# Patient Record
Sex: FEMALE | Race: WHITE | NOT HISPANIC OR LATINO | Employment: FULL TIME | URBAN - METROPOLITAN AREA
[De-identification: names, ages, dates, MRNs, and addresses within clinical notes are randomized per-mention and may not be internally consistent; named-entity substitution may affect disease eponyms.]

---

## 2017-01-06 ENCOUNTER — PRENATAL OFFICE VISIT (OUTPATIENT)
Dept: OBGYN | Facility: CLINIC | Age: 27
End: 2017-01-06
Payer: COMMERCIAL

## 2017-01-06 VITALS
DIASTOLIC BLOOD PRESSURE: 61 MMHG | OXYGEN SATURATION: 95 % | BODY MASS INDEX: 36.69 KG/M2 | TEMPERATURE: 97.4 F | WEIGHT: 204 LBS | HEART RATE: 80 BPM | SYSTOLIC BLOOD PRESSURE: 116 MMHG

## 2017-01-06 DIAGNOSIS — Z34.80 ENCOUNTER FOR SUPERVISION OF OTHER NORMAL PREGNANCY: Primary | ICD-10-CM

## 2017-01-06 PROCEDURE — 99207 ZZC PRENATAL VISIT: CPT | Performed by: NURSE PRACTITIONER

## 2017-01-06 NOTE — PROGRESS NOTES
Patient presents for routine prenatal visit. Prenatal flowsheet reviewed and updated as needed.  Denies vaginal bleeding, loss of fluid, contractions or cramping.  Patient without complaint. Screening ultrasound ordered.  Discussed Quad screening on return to clinic if desired-likely will decline.  Patient has questions related to her abnormal pap smear and need for colposcopy, these are answered for her. Will call to schedule colposcopy.  Advice as per Anticipatory Guidance/Checklist updated.  PE: See OB vitals    Questions asked and answered. Next OB visit in 4 week(s) with Dr. Ashraf.    Arianna FAROOQ CNP

## 2017-01-06 NOTE — MR AVS SNAPSHOT
After Visit Summary   1/6/2017    Gloria Sun    MRN: [id number]           Patient Information     Date Of Birth          1990        Visit Information        Provider Department      1/6/2017 9:30 AM Arianna Pierre APRN CNP [facility]         Follow-ups after your visit        Your next 10 appointments already scheduled     Feb 02, 2017  9:30 AM   ESTABLISHED PRENATAL with OSEAS Kenny CNP   [facility] ([facility])    [address] [facility] [address] [phone]              Who to contact     If you have questions or need follow up information about today's clinic visit or your schedule please contact [facility] directly at [phone].  Normal or non-critical lab and imaging results will be communicated to you by MyChart, letter or phone within 4 business days after the clinic has received the results. If you do not hear from us within 7 days, please contact the clinic through MyChart or phone. If you have a critical or abnormal lab result, we will notify you by phone as soon as possible.  Submit refill requests through [employer] or call your pharmacy and they will forward the refill request to us. Please allow 3 business days for your refill to be completed.          Additional Information About Your Visit        MyChart Information     [employer] gives you secure access to your electronic health record. If you see a primary care provider, you can also send messages to your care team and make appointments. If you have questions, please call your primary care clinic.  If you do not have a primary care provider, please call [phone] and they will assist you.        Care EveryWhere ID     This is your Care EveryWhere ID. This could be used by other organizations to access your [place] medical records  [id number]        Your Vitals Were     Pulse Temperature Pulse Oximetry Last Period          80 97.4   F (36.3  C) (Oral) 95% (LMP Unknown)         Blood Pressure from Last 3 Encounters:   01/06/17 116/61   12/09/16 105/68   11/14/16 107/66    Weight from Last 3 Encounters:   01/06/17 204 lb (92.534 kg)   12/09/16 204 lb 6.4 oz (92.715 kg)   11/14/16 211 lb 6.4 oz (95.89 kg)              Today, you had the following     No orders found for display       Primary Care Provider Office Phone # Fax #    Susanne Morales [phone]       [facility] [address] [address]        Goals        Patient-Stated    Psychosocial     Goal Comments - Note edited  9/20/2016  1:05 PM by Shahrzad Hernandez BSW    As of today's date 9/20/2016 goal is met at 26 - 50%.   Goal Status:  Discontinued   SW unable to contact pt X3  As of today's date 6/23/2016 goal is met at 26 - 50%.   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     Thank you for choosing [facility]  [facility]. Our goal is always to provide you with excellent care. Hearing back from our patients is one way we can continue to improve our services. Please take a few minutes to complete the written survey that you may receive in the mail after your visit with us. Thank you!             Your Updated Medication List - Protect others around you: Learn how to safely use, store and throw away your medicines at www.disposemymeds.org.          This list is accurate as of: 1/6/17  9:55 AM.  Always use your most recent med list.                   Brand Name Dispense Instructions for use    albuterol 108 (90 BASE) MCG/ACT Inhaler    PROAIR HFA/PROVENTIL HFA/VENTOLIN HFA    1 Inhaler    Inhale 2 puffs into the lungs every 4 hours as needed for shortness of breath / dyspnea or wheezing       PRENATAL VITAMIN PO      Take 1  tablet by mouth daily

## 2017-01-06 NOTE — NURSING NOTE
"Chief Complaint   Patient presents with     Prenatal Care       Initial /61 mmHg  Pulse 80  Temp(Src) 97.4  F (36.3  C) (Oral)  Wt 204 lb (92.534 kg)  SpO2 95%  LMP  (LMP Unknown) Estimated body mass index is 36.69 kg/(m^2) as calculated from the following:    Height as of 12/9/16: 5' 2.5\" (1.588 m).    Weight as of this encounter: 204 lb (92.534 kg)..  BP completed using cuff size: vero Mcmahon M.A.      "

## 2017-01-16 ENCOUNTER — OFFICE VISIT (OUTPATIENT)
Dept: OBGYN | Facility: CLINIC | Age: 27
End: 2017-01-16
Payer: COMMERCIAL

## 2017-01-16 VITALS
TEMPERATURE: 98.4 F | SYSTOLIC BLOOD PRESSURE: 110 MMHG | WEIGHT: 208 LBS | DIASTOLIC BLOOD PRESSURE: 61 MMHG | OXYGEN SATURATION: 99 % | BODY MASS INDEX: 37.41 KG/M2 | HEART RATE: 65 BPM

## 2017-01-16 DIAGNOSIS — R87.610 ASCUS WITH POSITIVE HIGH RISK HPV CERVICAL: Primary | ICD-10-CM

## 2017-01-16 DIAGNOSIS — R87.810 ASCUS WITH POSITIVE HIGH RISK HPV CERVICAL: Primary | ICD-10-CM

## 2017-01-16 DIAGNOSIS — Z34.80 ENCOUNTER FOR SUPERVISION OF OTHER NORMAL PREGNANCY: ICD-10-CM

## 2017-01-16 PROCEDURE — 99212 OFFICE O/P EST SF 10 MIN: CPT | Mod: 25 | Performed by: OBSTETRICS & GYNECOLOGY

## 2017-01-16 PROCEDURE — 57452 EXAM OF CERVIX W/SCOPE: CPT | Performed by: OBSTETRICS & GYNECOLOGY

## 2017-01-16 NOTE — MR AVS SNAPSHOT
After Visit Summary   1/16/2017    Gloria Sun    MRN: [id number]           Patient Information     Date Of Birth          1990        Visit Information        Provider Department      1/16/2017 12:00 PM Ravi Ashraf MD; [place] PROCEDURE ROOM 2 [facility]        Today's Diagnoses     ASCUS with positive high risk HPV cervical    -  1        Follow-ups after your visit        Your next 10 appointments already scheduled     Jan 16, 2017 12:00 PM   Office Visit with Ravi Ashraf MD, [place] [facility]EDURE ROOM [facility]    [address] [address]   [phone]           Bring a current list of meds and any records pertaining to this visit.  For Physicals, please bring immunization records and any forms needing to be filled out.  Please arrive 10 minutes early to complete paperwork.            Feb 03, 2017  9:30 AM   ESTABLISHED PRENATAL with OSEAS Kenny CNP   [facility]    [address] [facility] [address] [phone]            Feb 03, 2017 11:00 AM   US OB > 14 WEEKS COMPLETE SINGLE with FKUS1   [facility] ([facility])    [address] [place] [id number] [phone]           Please bring a list of your medicines (including vitamins, minerals and over-the-counter drugs). Also, tell your doctor about any allergies you may have. Wear comfortable clothes and leave your valuables at home.  If you re less than 20 weeks drink four 8-ounce glasses of fluid an hour before your exam. If you need to empty your bladder before your exam, try to release only a little urine. Then, drink another glass of fluid.  You may have up to two family members in the exam room. If you bring a small child, an adult must be there to care for him or her.  Please call the Imaging Department at your exam site with any  questions.              Who to contact     If you have questions or need follow up information about today's clinic visit or your schedule please contact [facility] directly at [phone].  Normal or non-critical lab and imaging results will be communicated to you by MyChart, letter or phone within 4 business days after the clinic has received the results. If you do not hear from us within 7 days, please contact the clinic through MyChart or phone. If you have a critical or abnormal lab result, we will notify you by phone as soon as possible.  Submit refill requests through [employer] or call your pharmacy and they will forward the refill request to us. Please allow 3 business days for your refill to be completed.          Additional Information About Your Visit        [employer] Information     [employer] gives you secure access to your electronic health record. If you see a primary care provider, you can also send messages to your care team and make appointments. If you have questions, please call your primary care clinic.  If you do not have a primary care provider, please call [phone] and they will assist you.        Care EveryWhere ID     This is your Care EveryWhere ID. This could be used by other organizations to access your [place] medical records  [id number]        Your Vitals Were     Pulse Temperature Pulse Oximetry Last Period          65 98.4  F (36.9  C) (Oral) 99% (LMP Unknown)         Blood Pressure from Last 3 Encounters:   01/16/17 110/61   01/06/17 116/61   12/09/16 105/68    Weight from Last 3 Encounters:   01/16/17 208 lb (94.348 kg)   01/06/17 204 lb (92.534 kg)   12/09/16 204 lb 6.4 oz (92.715 kg)              Today, you had the following     No orders found for display       Primary Care Provider Office Phone # Fax #    Susanne Morales [phone]       [facility] [address] [place] [address]        Goals        Patient-Stated     Psychosocial     Goal Comments - Note edited  9/20/2016  1:05 PM by Shahrzad Hernandez BSW    As of today's date 9/20/2016 goal is met at 26 - 50%.   Goal Status:  Discontinued   SW unable to contact pt X3  As of today's date 6/23/2016 goal is met at 26 - 50%.   Goal Status:  Active   Pt's mother is continuing to work with county financial worker to get her 2 year old son onto Medical Assistance                                                                                  John Hernandez LSW          Thank you!     Thank you for choosing [facility]  for your care. Our goal is always to provide you with excellent care. Hearing back from our patients is one way we can continue to improve our services. Please take a few minutes to complete the written survey that you may receive in the mail after your visit with us. Thank you!             Your Updated Medication List - Protect others around you: Learn how to safely use, store and throw away your medicines at www.disposemymeds.org.          This list is accurate as of: 1/16/17 11:54 AM.  Always use your most recent med list.                   Brand Name Dispense Instructions for use    albuterol 108 (90 BASE) MCG/ACT Inhaler    PROAIR HFA/PROVENTIL HFA/VENTOLIN HFA    1 Inhaler    Inhale 2 puffs into the lungs every 4 hours as needed for shortness of breath / dyspnea or wheezing       PRENATAL VITAMIN PO      Take 1 tablet by mouth daily

## 2017-01-16 NOTE — NURSING NOTE
"Chief Complaint   Patient presents with     Colposcopy     ASC-US HPV+       Initial /61 mmHg  Pulse 65  Temp(Src) 98.4  F (36.9  C) (Oral)  Wt 208 lb (94.348 kg)  SpO2 99%  LMP  (LMP Unknown) Estimated body mass index is 37.41 kg/(m^2) as calculated from the following:    Height as of 12/9/16: 5' 2.5\" (1.588 m).    Weight as of this encounter: 208 lb (94.348 kg).  BP completed using cuff size: jean Granados CMA      "

## 2017-01-18 NOTE — PROGRESS NOTES
Gloria presents for colposcopy. Pap showed: ASCUS HR HPV+.     PMH/PSH/Meds/Allergies reviewed & documented in [facility].    I discussed with the patient the results of her pap smear and/or HPV studies.    I discussed our current understanding of abnormal cytology and the role hpv can play in pre-cancerous cervical change.  I explained the current cytological/histologic terminology.      We discussed the screening nature of pap smears and the need for a more definitive examination.    She is given the opportunity to ask questions and have them answered.  She does agree to proceed with colposcopy.    Procedure for colposcopy and biopsy has been explained to the   patient and consent obtained.    PROCEDURE:  Speculum placed in vagina and excellent visualization of cervix achieved, cervix swabbed  with acetic acid solution.    FINDINGS:  Cervix: no visible lesions, no mosaicism, no punctation and no abnormal vasculature; SCJ visualized 360 degrees without lesions and no biopsies taken.    Vaginal inspection: vaginal colposcopy not performed.    Vulvar colposcopy: vulvar colposcopy not performed.    Procedure Summary: Patient tolerated procedure well and colposcopy adequate.    Colposcopic Impression: HPV effect    10-15 minutes were spent in face to face discussion regarding her pap and HPV status and how that affects her pregnancy.  This was in addition to the time required for the procedure.     Plan:  Co-testing postpartum.    Ravi Ashraf MD FACOG

## 2017-01-28 ENCOUNTER — TELEPHONE (OUTPATIENT)
Dept: NURSING | Facility: CLINIC | Age: 27
End: 2017-01-28

## 2017-01-28 NOTE — TELEPHONE ENCOUNTER
"Call Type: Triage Call    Presenting Problem: \"I am pregnant and I have a cold (no fever), I am  wondering which OTC mneds I can take.\" Gave list of medications safe  to take during pregnancy per RN protocol. No triage needed. Advised  if sx worsen or you develop a fever to make appt.  Triage Note:  Guideline Title: Medication Questions - Adult  Recommended Disposition: Provide Health Information  Original Inclination: Wanted to speak with a nurse  Override Disposition:  Intended Action: Follow advice given  Physician Contacted: No  Caller has medication question(s) that was answered with available resources ?  YES  Pregnant and has medication questions regarding prescribed and/or nonprescribed  medication(s) not covered by available resources ? NO  Breastfeeding and has medication questions regarding prescribed and/or  nonprescribed medication(s) not covered by available resources ? NO  Requested information on how to safely dispose of  or unused medications ?  NO  Sign(s) or symptom(s) associated with a diagnosed condition or with a new illness  ? NO  Prescription ordered today and not available at pharmacy putting patient at  clinical risk ? NO  Recurrence of a symptom(s) or illness post prescribed medication treatment AND  provider instructed patient to call if symptom(s) returned. ? NO  Unable to obtain prescribed medication related to available resources AND  situation poses immediate clinical risk ? NO  Pharmacy calling to clarify prescription order. ? NO  Requests refill of prescribed medication that does NOT have a valid refill; lack  of medication may cause clinical risk to patient if not available. ? NO  Has questions about prescribed and/or nonprescribed medications not covered by  available resources ? NO  Pharmacy calling with prescription question; answered per department policy. ? NO  Requests refill of prescribed medication without valid refills OR requests refill  of prescribed medication " with valid refills but does not have prescription number  (no RX container); lack of medication does not put patient at clinical risk ? NO  Physician Instructions:  Care Advice:

## 2017-02-03 ENCOUNTER — PRENATAL OFFICE VISIT (OUTPATIENT)
Dept: OBGYN | Facility: CLINIC | Age: 27
End: 2017-02-03
Payer: COMMERCIAL

## 2017-02-03 ENCOUNTER — RADIANT APPOINTMENT (OUTPATIENT)
Dept: ULTRASOUND IMAGING | Facility: CLINIC | Age: 27
End: 2017-02-03
Attending: NURSE PRACTITIONER
Payer: COMMERCIAL

## 2017-02-03 VITALS
TEMPERATURE: 98 F | HEIGHT: 63 IN | SYSTOLIC BLOOD PRESSURE: 117 MMHG | DIASTOLIC BLOOD PRESSURE: 68 MMHG | BODY MASS INDEX: 37.25 KG/M2 | WEIGHT: 210.2 LBS | HEART RATE: 72 BPM

## 2017-02-03 DIAGNOSIS — Z34.80 ENCOUNTER FOR SUPERVISION OF OTHER NORMAL PREGNANCY: Primary | ICD-10-CM

## 2017-02-03 DIAGNOSIS — Z34.80 ENCOUNTER FOR SUPERVISION OF OTHER NORMAL PREGNANCY: ICD-10-CM

## 2017-02-03 PROCEDURE — 99207 ZZC PRENATAL VISIT: CPT | Performed by: NURSE PRACTITIONER

## 2017-02-03 PROCEDURE — 76805 OB US >/= 14 WKS SNGL FETUS: CPT

## 2017-02-03 ASSESSMENT — PAIN SCALES - GENERAL: PAINLEVEL: NO PAIN (0)

## 2017-02-03 NOTE — PROGRESS NOTES
Patient presents for routine prenatal visit. Prenatal flowsheet reviewed and updated as needed.  Denies vaginal bleeding, loss of fluid, contractions or cramping.  Patient without complaint. I discussed with the patient the option of maternal serum screening for chromasomal abnormalities (such as Trisomy 18 and 21) and neural tube defects.  We discussed the screening nature of this test and the potential for false negative and false positive results and the possible need for additional testing including Level 2 ultrasound and amniocentesis. She is given the opportunity to ask questions and have them answered. She declines testing.  Screening ultrasound scheduled.    Advice as per Anticipatory Guidance/Checklist updated.  PE: See OB vitals    Questions asked and answered. Next OB visit in 4 week(s) with Dr. Ashraf.    Arianna FAROOQ CNP

## 2017-02-03 NOTE — MR AVS SNAPSHOT
After Visit Summary   2/3/2017    Gloria Sun    MRN: [id number]           Patient Information     Date Of Birth          1990        Visit Information        Provider Department      2/3/2017 9:30 AM Arianna Pierre APRN CNP [facility]         Follow-ups after your visit        Your next 10 appointments already scheduled     Feb 03, 2017 11:00 AM   US OB > 14 WEEKS COMPLETE SINGLE with FKUS1   [facility] ([facility])    [address] [place] [address] [phone]           Please bring a list of your medicines (including vitamins, minerals and over-the-counter drugs). Also, tell your doctor about any allergies you may have. Wear comfortable clothes and leave your valuables at home.  If you re less than 20 weeks drink four 8-ounce glasses of fluid an hour before your exam. If you need to empty your bladder before your exam, try to release only a little urine. Then, drink another glass of fluid.  You may have up to two family members in the exam room. If you bring a small child, an adult must be there to care for him or her.  Please call the Imaging Department at your exam site with any questions.              Who to contact     If you have questions or need follow up information about today's clinic visit or your schedule please contact [facility] directly at [phone].  Normal or non-critical lab and imaging results will be communicated to you by MyChart, letter or phone within 4 business days after the clinic has received the results. If you do not hear from us within 7 days, please contact the clinic through MyChart or phone. If you have a critical or abnormal lab result, we will notify you by phone as soon as possible.  Submit refill requests through [employer] or call your pharmacy and they will forward the refill request to us. Please allow 3 business days for your refill to be completed.           "Additional Information About Your Visit        MyChart Information     [employer] gives you secure access to your electronic health record. If you see a primary care provider, you can also send messages to your care team and make appointments. If you have questions, please call your primary care clinic.  If you do not have a primary care provider, please call [phone] and they will assist you.        Care EveryWhere ID     This is your Care EveryWhere ID. This could be used by other organizations to access your [place] medical records  [id number]        Your Vitals Were     Pulse Temperature Height BMI (Body Mass Index) Last Period       72 98  F (36.7  C) (Oral) 5' 2.5\" (1.588 m) 37.81 kg/m2 (LMP Unknown)        Blood Pressure from Last 3 Encounters:   02/03/17 117/68   01/16/17 110/61   01/06/17 116/61    Weight from Last 3 Encounters:   02/03/17 210 lb 3.2 oz (95.346 kg)   01/16/17 208 lb (94.348 kg)   01/06/17 204 lb (92.534 kg)              Today, you had the following     No orders found for display       Primary Care Provider Office Phone # Fax #    Susanne Morales [phone]       [address] [address]        Goals        Patient-Stated    Psychosocial     Goal Comments - Note edited  9/20/2016  1:05 PM by Shahrzad Hernandez BSW    As of today's date 9/20/2016 goal is met at 26 - 50%.   Goal Status:  Discontinued   SW unable to contact pt X3  As of today's date 6/23/2016 goal is met at 26 - 50%.   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     Thank you for choosing [facility]  [facility]. Our goal is always to provide you with excellent care. Hearing back from our patients is one way we can continue to improve our services. " Please take a few minutes to complete the written survey that you may receive in the mail after your visit with us. Thank you!             Your Updated Medication List - Protect others around you: Learn how to safely use, store and throw away your medicines at www.disposemymeds.org.          This list is accurate as of: 2/3/17  9:47 AM.  Always use your most recent med list.                   Brand Name Dispense Instructions for use    albuterol 108 (90 BASE) MCG/ACT Inhaler    PROAIR HFA/PROVENTIL HFA/VENTOLIN HFA    1 Inhaler    Inhale 2 puffs into the lungs every 4 hours as needed for shortness of breath / dyspnea or wheezing       PRENATAL VITAMIN PO      Take 1 tablet by mouth daily

## 2017-02-03 NOTE — NURSING NOTE
"Chief Complaint   Patient presents with     Prenatal Care     18w6d       Initial /68 mmHg  Pulse 72  Temp(Src) 98  F (36.7  C) (Oral)  Ht 5' 2.5\" (1.588 m)  Wt 210 lb 3.2 oz (95.346 kg)  BMI 37.81 kg/m2  LMP  (LMP Unknown) Estimated body mass index is 37.81 kg/(m^2) as calculated from the following:    Height as of this encounter: 5' 2.5\" (1.588 m).    Weight as of this encounter: 210 lb 3.2 oz (95.346 kg)..  BP completed using cuff size: jean Adhikari CMA      "

## 2017-03-03 ENCOUNTER — PRENATAL OFFICE VISIT (OUTPATIENT)
Dept: OBGYN | Facility: CLINIC | Age: 27
End: 2017-03-03
Payer: COMMERCIAL

## 2017-03-03 VITALS
DIASTOLIC BLOOD PRESSURE: 62 MMHG | BODY MASS INDEX: 38.77 KG/M2 | HEIGHT: 63 IN | HEART RATE: 85 BPM | TEMPERATURE: 97 F | SYSTOLIC BLOOD PRESSURE: 116 MMHG | WEIGHT: 218.8 LBS

## 2017-03-03 DIAGNOSIS — O26.892 RH NEGATIVE STATE IN ANTEPARTUM PERIOD, SECOND TRIMESTER: ICD-10-CM

## 2017-03-03 DIAGNOSIS — Z67.91 RH NEGATIVE STATE IN ANTEPARTUM PERIOD, SECOND TRIMESTER: ICD-10-CM

## 2017-03-03 DIAGNOSIS — Z34.80 ENCOUNTER FOR SUPERVISION OF OTHER NORMAL PREGNANCY: Primary | ICD-10-CM

## 2017-03-03 PROCEDURE — 99207 ZZC PRENATAL VISIT: CPT | Performed by: NURSE PRACTITIONER

## 2017-03-03 ASSESSMENT — PAIN SCALES - GENERAL: PAINLEVEL: NO PAIN (0)

## 2017-03-03 NOTE — MR AVS SNAPSHOT
After Visit Summary   3/3/2017    Gloria Sun    MRN: [id number]           Patient Information     Date Of Birth          1990        Visit Information        Provider Department      3/3/2017 10:10 AM Arianna Pierre APRN CNP [facility]        Today's Diagnoses     Encounter for supervision of other normal pregnancy    -  1    Rh negative state in antepartum period, second trimester           Follow-ups after your visit        Your next 10 appointments already scheduled     Apr 03, 2017 10:15 AM CDT   ESTABLISHED PRENATAL with Ravi Ashraf MD   [facility]    [address] [facility] [address] [phone]              Future tests that were ordered for you today     Open Future Orders        Priority Expected Expires Ordered    Hemoglobin Routine  7/3/2017 3/3/2017    ABO/Rh type and screen Routine  7/3/2017 3/3/2017    Glucose tolerance gest screen 1 hour Routine  7/3/2017 3/3/2017            Who to contact     If you have questions or need follow up information about today's clinic visit or your schedule please contact [facility] directly at [phone].  Normal or non-critical lab and imaging results will be communicated to you by [employer] [facility], letter or phone within 4 business days after the clinic has received the results. If you do not hear from us within 7 days, please contact the clinic through [employer] [facility] [employer] or phone. If you have a critical or abnormal lab result, we will notify you by phone as soon as possible.  Submit refill requests through [employer] or call your pharmacy and they will forward the refill request to us. Please allow 3 business days for your refill to be completed.          Additional Information About Your Visit        MyChart Information     [employer] gives you secure access to your electronic health record. If you see a primary care provider, you can also send messages to your care team  "and make appointments. If you have questions, please call your primary care clinic.  If you do not have a primary care provider, please call [phone] and they will assist you.        Care EveryWhere ID     This is your Care EveryWhere ID. This could be used by other organizations to access your [facility] medical records  [id number]        Your Vitals Were     Pulse Temperature Height Last Period BMI (Body Mass Index)       85 97  F (36.1  C) (Oral) 5' 2.5\" (1.588 m) (LMP Unknown) 39.38 kg/m2        Blood Pressure from Last 3 Encounters:   03/03/17 116/62   02/03/17 117/68   01/16/17 110/61    Weight from Last 3 Encounters:   03/03/17 218 lb 12.8 oz (99.2 kg)   02/03/17 210 lb 3.2 oz (95.3 kg)   01/16/17 208 lb (94.3 kg)               Primary Care Provider Office Phone # Fax #    Susanne Morales [phone]       [address] [address]        Goals        General    Psychosocial (pt-stated)     Notes - Note edited  9/20/2016  1:05 PM by Shahrzad Hernandez, BSW    As of today's date 9/20/2016 goal is met at 26 - 50%.   Goal Status:  Discontinued   SW unable to contact pt X3  As of today's date 6/23/2016 goal is met at 26 - 50%.   Goal Status:  Active   Pt's mother is continuing to work with [email or web address] to get her 2 year old son onto Medical Assistance                                                                                  BOLA Gauthier          Thank you!     Thank you for choosing [facility]  for your care. Our goal is always to provide you with excellent care. Hearing back from our patients is one way we can continue to improve our services. Please take a few minutes to complete the written survey that you may receive in the mail after your visit with us. Thank you!             Your Updated Medication List - Protect others around you: Learn how to safely use, store and throw away your medicines at www.disposemymeds.org. "          This list is accurate as of: 3/3/17 10:25 AM.  Always use your most recent med list.                   Brand Name Dispense Instructions for use    albuterol 108 (90 BASE) MCG/ACT Inhaler    PROAIR HFA/PROVENTIL HFA/VENTOLIN HFA    1 Inhaler    Inhale 2 puffs into the lungs every 4 hours as needed for shortness of breath / dyspnea or wheezing       PRENATAL VITAMIN PO      Take 1 tablet by mouth daily

## 2017-03-03 NOTE — PROGRESS NOTES
Patient presents for routine prenatal visit. Prenatal flowsheet reviewed and updated as needed.  Denies vaginal bleeding, loss of fluid, contractions or cramping.  Patient without complaint. 1 hour GTT, HGB and Brigida screening on return to clinic.  Advice as per Anticipatory Guidance/Checklist updated.  PE: See OB vitals    Questions asked and answered. Next OB visit in 4 week(s) with Dr. Ashraf.    Arianna FAROOQ CNP

## 2017-03-03 NOTE — NURSING NOTE
"Chief Complaint   Patient presents with     Prenatal Care     22w6d       Initial /62  Pulse 85  Temp 97  F (36.1  C) (Oral)  Ht 5' 2.5\" (1.588 m)  Wt 218 lb 12.8 oz (99.2 kg)  LMP  (LMP Unknown)  BMI 39.38 kg/m2 Estimated body mass index is 39.38 kg/(m^2) as calculated from the following:    Height as of this encounter: 5' 2.5\" (1.588 m).    Weight as of this encounter: 218 lb 12.8 oz (99.2 kg)..  BP completed using cuff size: jean Adhikari CMA    "

## 2017-03-31 ENCOUNTER — PRENATAL OFFICE VISIT (OUTPATIENT)
Dept: OBGYN | Facility: CLINIC | Age: 27
End: 2017-03-31
Payer: COMMERCIAL

## 2017-03-31 VITALS
WEIGHT: 222.8 LBS | TEMPERATURE: 97.6 F | DIASTOLIC BLOOD PRESSURE: 64 MMHG | BODY MASS INDEX: 39.48 KG/M2 | HEIGHT: 63 IN | HEART RATE: 65 BPM | SYSTOLIC BLOOD PRESSURE: 112 MMHG

## 2017-03-31 DIAGNOSIS — Z34.80 ENCOUNTER FOR SUPERVISION OF OTHER NORMAL PREGNANCY: Primary | ICD-10-CM

## 2017-03-31 DIAGNOSIS — R42 DIZZINESS: ICD-10-CM

## 2017-03-31 LAB — GLUCOSE SERPL-MCNC: 71 MG/DL (ref 70–99)

## 2017-03-31 PROCEDURE — 82947 ASSAY GLUCOSE BLOOD QUANT: CPT | Performed by: NURSE PRACTITIONER

## 2017-03-31 PROCEDURE — 99207 ZZC PRENATAL VISIT: CPT | Performed by: NURSE PRACTITIONER

## 2017-03-31 PROCEDURE — 36415 COLL VENOUS BLD VENIPUNCTURE: CPT | Performed by: NURSE PRACTITIONER

## 2017-03-31 RX ORDER — MECLIZINE HYDROCHLORIDE 25 MG/1
25 TABLET ORAL EVERY 6 HOURS PRN
Qty: 30 TABLET | Refills: 0 | Status: SHIPPED | OUTPATIENT
Start: 2017-03-31 | End: 2017-10-17

## 2017-03-31 ASSESSMENT — PAIN SCALES - GENERAL: PAINLEVEL: NO PAIN (0)

## 2017-03-31 NOTE — PROGRESS NOTES
S: Gloria is a 26 year old  2 para 1 presenting today with concerns of ongoing dizziness for the last week or so. Has her normal routine prenatal visit on Monday and will plan to keep that appointment and do 1 hour GTT then.   Patient noticing this with position changes mostly. Can occur with getting up out of bed, turning over in bed, if she looks down. Describes as feeling of spinning and can sway/tilt for a few seconds. Has been really pushing fluids this week and feels she is eating regularly. Not related to food intake, symptoms do not improve with eating, drinking. Noticed it this morning while blow drying her hair as well.  Denies loss of consciousness, episodes of losing balance. Denies chest pains, palpitations, CASTRO, SOB. Does not feel like an entire room is spinning. Has not tried anything else to manage symptoms. Denies vaginal bleeding, contractions, cramping. Is feeling FM. Patient medical, surgical, social, and family history reviewed and updated at today's visit. ROS: 10 point ROS neg other than the symptoms noted above in the HPI.    O: This is a well appearing female in no acute distress. Answers questions and maintains eye contact appropriately. Vital signs noted.  Patient ambulates without difficulty, no swaying motions.   CV: Regular rate and rhythm without murmur, gallop, rub  RESPIRATORY: Clear to auscultation bilaterally.  See Extended Vitals.     A/P:  (R42) Dizziness  Comment: Discussed possible etiologies of her symptoms. Does not seem suggestive of vestibulitis, but will try Meclizine QID over this weekend to see if it improves symptoms. Will check random non fasting glucose today as well. Reviewed her blood pressure readings today. Discussed pregnancy related changes that can cause symptoms of dizziness. Reviewed slow position changes, patient to turn her body instead of her head only, bend at the knees instead of the waist etc. Warning signs to monitor for and report immediately  discussed with patient and she verbalizes understanding. Patient aware of need to go to ED with any loss of consciousness. Patient is given an opportunity to ask questions and have them answered. Keep scheduled appointment Monday.  Plan: Glucose, meclizine (ANTIVERT) 25 MG tablet        Arianna FAROOQ CNP

## 2017-03-31 NOTE — NURSING NOTE
"Chief Complaint   Patient presents with     Prenatal Care     26w6d / dizzy x 2 days       Initial /61  Pulse 65  Temp 97.6  F (36.4  C) (Oral)  Ht 5' 2.5\" (1.588 m)  Wt 222 lb 12.8 oz (101.1 kg)  LMP  (LMP Unknown)  BMI 40.1 kg/m2 Estimated body mass index is 40.1 kg/(m^2) as calculated from the following:    Height as of this encounter: 5' 2.5\" (1.588 m).    Weight as of this encounter: 222 lb 12.8 oz (101.1 kg)..  BP completed using cuff size: jean Adhikari CMA    "

## 2017-03-31 NOTE — MR AVS SNAPSHOT
After Visit Summary   3/31/2017    Gloria Sun    MRN: [id number]           Patient Information     Date Of Birth          1990        Visit Information        Provider Department      3/31/2017 1:30 PM Arianna Pierre APRN CNP [facility]        Today's Diagnoses     Encounter for supervision of other normal pregnancy    -  1    Dizziness           Follow-ups after your visit        Your next 10 appointments already scheduled     Apr 03, 2017  9:15 AM CDT   LAB with AN LAB   [facility] ([facility])    [address] [facility] [address] [phone]           Patient must bring picture ID.  Patient should be prepared to give a urine specimen  Please do not eat 10-12 hours before your appointment if you are coming in fasting for labs on lipids, cholesterol, or glucose (sugar).  Pregnant women should follow their Care Team instructions. Water with medications is okay. Do not drink coffee or other fluids.   If you have concerns about taking  your medications, please ask at office or if scheduling via [employer], send a message by clicking on Secure Messaging, Message Your Care Team.            Apr 03, 2017 10:15 AM CDT   ESTABLISHED PRENATAL with Ravi Ashraf MD   [facility] ([facility]    [address] [facility] [address] [phone]              Who to contact     If you have questions or need follow up information about today's clinic visit or your schedule please contact [facility] directly at [phone].  Normal or non-critical lab and imaging results will be communicated to you by MyChart, letter or phone within 4 business days after the clinic has received the results. If you do not hear from us within 7 days, please contact the clinic through MyChart or phone. If you have a critical or abnormal lab result, we will notify you by phone as soon as possible.  Submit refill  "requests through [employer] or call your pharmacy and they will forward the refill request to us. Please allow 3 business days for your refill to be completed.          Additional Information About Your Visit        [employer] Information     [employer] gives you secure access to your electronic health record. If you see a primary care provider, you can also send messages to your care team and make appointments. If you have questions, please call your primary care clinic.  If you do not have a primary care provider, please call [phone] and they will assist you.        Care EveryWhere ID     This is your Care EveryWhere ID. This could be used by other organizations to access your [facility] medical records  [id number]        Your Vitals Were     Pulse Temperature Height Last Period BMI (Body Mass Index)       65 97.6  F (36.4  C) (Oral) 5' 2.5\" (1.588 m) (LMP Unknown) 40.1 kg/m2        Blood Pressure from Last 3 Encounters:   03/31/17 107/61   03/03/17 116/62   02/03/17 117/68    Weight from Last 3 Encounters:   03/31/17 222 lb 12.8 oz (101.1 kg)   03/03/17 218 lb 12.8 oz (99.2 kg)   02/03/17 210 lb 3.2 oz (95.3 kg)              We Performed the Following     Glucose          Today's Medication Changes          These changes are accurate as of: 3/31/17  2:03 PM.  If you have any questions, ask your nurse or doctor.               Start taking these medicines.        Dose/Directions    meclizine 25 MG tablet   Commonly known as:  ANTIVERT   Used for:  Dizziness   Started by:  Arianna Pierre APRN CNP        Dose:  25 mg   Take 1 tablet (25 mg) by mouth every 6 hours as needed for dizziness   Quantity:  30 tablet   Refills:  0            Where to get your medicines      These medications were sent to [facility]/pharmacy #[id number] - [place], [place] - [address] [address]     Phone:  [phone]     meclizine 25 MG tablet                Primary Care Provider Office Phone # Fax #    Susanne " MD Carmen [phone]       [facility] [address] [place], [place] [address] [address]        Goals        General    Psychosocial (pt-stated)     Notes - Note edited  9/20/2016  1:05 PM by Shahrzad Hernandez, BSW    As of today's date 9/20/2016 goal is met at 26 - 50%.   Goal Status:  Discontinued   SW unable to contact pt X3  As of today's date 6/23/2016 goal is met at 26 - 50%.   Goal Status:  Active   Pt's mother is continuing to work with county financial worker to get her 2 year old son onto Medical Assistance                                                                                  John Hernandez LSW          Thank you!     Thank you for choosing [facility]  for your [facility]. Our goal is always to provide you with excellent care. Hearing back from our patients is one way we can continue to improve our services. Please take a few minutes to complete the written survey that you may receive in the mail after your visit with us. Thank you!             Your Updated Medication List - Protect others around you: Learn how to safely use, store and throw away your medicines at www.disposemymeds.org.          This list is accurate as of: 3/31/17  2:03 PM.  Always use your most recent med list.                   Brand Name Dispense Instructions for use    albuterol 108 (90 BASE) MCG/ACT Inhaler    PROAIR HFA/PROVENTIL HFA/VENTOLIN HFA    1 Inhaler    Inhale 2 puffs into the lungs every 4 hours as needed for shortness of breath / dyspnea or wheezing       meclizine 25 MG tablet    ANTIVERT    30 tablet    Take 1 tablet (25 mg) by mouth every 6 hours as needed for dizziness       PRENATAL VITAMIN PO      Take 1 tablet by mouth daily

## 2017-04-03 ENCOUNTER — PRENATAL OFFICE VISIT (OUTPATIENT)
Dept: OBGYN | Facility: CLINIC | Age: 27
End: 2017-04-03
Payer: COMMERCIAL

## 2017-04-03 VITALS
DIASTOLIC BLOOD PRESSURE: 64 MMHG | BODY MASS INDEX: 40.25 KG/M2 | WEIGHT: 223.6 LBS | OXYGEN SATURATION: 98 % | SYSTOLIC BLOOD PRESSURE: 111 MMHG | TEMPERATURE: 98.1 F | HEART RATE: 72 BPM

## 2017-04-03 DIAGNOSIS — Z34.80 ENCOUNTER FOR SUPERVISION OF OTHER NORMAL PREGNANCY: ICD-10-CM

## 2017-04-03 DIAGNOSIS — Z67.91 RH NEGATIVE STATE IN ANTEPARTUM PERIOD, THIRD TRIMESTER: ICD-10-CM

## 2017-04-03 DIAGNOSIS — R73.09 ABNORMAL GLUCOSE TOLERANCE TEST: Primary | ICD-10-CM

## 2017-04-03 DIAGNOSIS — O36.8130 DECREASED FETAL MOVEMENT, THIRD TRIMESTER, NOT APPLICABLE OR UNSPECIFIED FETUS: ICD-10-CM

## 2017-04-03 DIAGNOSIS — O26.893 RH NEGATIVE STATE IN ANTEPARTUM PERIOD, THIRD TRIMESTER: ICD-10-CM

## 2017-04-03 DIAGNOSIS — Z34.80 ENCOUNTER FOR SUPERVISION OF OTHER NORMAL PREGNANCY: Primary | ICD-10-CM

## 2017-04-03 DIAGNOSIS — Z67.91 RH NEGATIVE STATE IN ANTEPARTUM PERIOD, SECOND TRIMESTER: ICD-10-CM

## 2017-04-03 DIAGNOSIS — O26.892 RH NEGATIVE STATE IN ANTEPARTUM PERIOD, SECOND TRIMESTER: ICD-10-CM

## 2017-04-03 LAB
GLUCOSE 1H P 50 G GLC PO SERPL-MCNC: 149 MG/DL (ref 60–129)
HGB BLD-MCNC: 11.3 G/DL (ref 11.7–15.7)

## 2017-04-03 PROCEDURE — 59025 FETAL NON-STRESS TEST: CPT | Performed by: OBSTETRICS & GYNECOLOGY

## 2017-04-03 PROCEDURE — 99207 ZZC PRENATAL VISIT: CPT | Performed by: OBSTETRICS & GYNECOLOGY

## 2017-04-03 PROCEDURE — 36415 COLL VENOUS BLD VENIPUNCTURE: CPT | Performed by: NURSE PRACTITIONER

## 2017-04-03 PROCEDURE — 96372 THER/PROPH/DIAG INJ SC/IM: CPT | Performed by: OBSTETRICS & GYNECOLOGY

## 2017-04-03 PROCEDURE — 82950 GLUCOSE TEST: CPT | Performed by: NURSE PRACTITIONER

## 2017-04-03 PROCEDURE — 85018 HEMOGLOBIN: CPT | Performed by: NURSE PRACTITIONER

## 2017-04-03 RX ORDER — CALCIUM CARBONATE 500 MG/1
TABLET, CHEWABLE ORAL
COMMUNITY
End: 2017-10-17

## 2017-04-03 NOTE — PROGRESS NOTES
The following medication was given:     MEDICATION: RhoGam 300 ug  ROUTE: IM  [facility]  DOSE: 300UG  LOT #: [id number]  : Amara Myers  EXPIRATION DATE:  2017  NDC#: 5024-3003-29  Rhonda Granados CMA

## 2017-04-03 NOTE — MR AVS SNAPSHOT
After Visit Summary   4/3/2017    Gloria Sun    MRN: [id number]           Patient Information     Date Of Birth          1990        Visit Information        Provider Department      4/3/2017 10:15 AM Ravi Ashraf MD [facility]        Today's Diagnoses     Encounter for supervision of other normal pregnancy    -  1    Rh negative state in antepartum period, third trimester        Decreased fetal movement, third trimester, not applicable or unspecified fetus          Care Instructions                                                         If you have any questions regarding your visit, Please contact your care team.    Women s Health CLINIC HOURS TELEPHONE NUMBER   MD Rhonda García CMA Lisa -    ROCIO Correa RN       Monday:       7:30-4:30 [place]  Wednesday:       7:30-4:30 [place]  Thursday:       7:30-1:30 [place]  Friday:       7:30-11:30 [facility]  [address] [place]  [address]  [phone] ask for Women's [facility]  [address] [place] [address]  [phone] ask for [facility]    Imaging Scheduling for [place]:  [phone]    Imaging Scheduling for [place]: [phone]       Urgent Care locations:    [facility] Saturday and Sunday   9 am - 5 pm    Monday-Friday   12 pm - 8 pm  Saturday and Sunday   9 am - 5 pm   [phone]     [facility] Labor and Delivery:  [phone]    If you need a medication refill, please contact your pharmacy. Please allow 3 business days for your refill to be completed.  As always, Thank you for trusting us with your healthcare needs!            Follow-ups after your visit        Follow-up notes from your care team     Return in about 2 weeks (around 4/17/2017) for Prenatal Visit.      Who to contact     If you have questions or need follow up information  about today's clinic visit or your schedule please contact [facility] directly at [phone].  Normal or non-critical lab and imaging results will be communicated to you by MyChart, letter or phone within 4 business days after the clinic has received the results. If you do not hear from us within 7 days, please contact the clinic through [email or web address] or phone. If you have a critical or abnormal lab result, we will notify you by phone as soon as possible.  Submit refill requests through [employer] or call your pharmacy and they will forward the refill request to us. Please allow 3 business days for your refill to be completed.          Additional Information About Your Visit        [email or web address] Information     [employer] gives you secure access to your electronic health record. If you see a primary care provider, you can also send messages to your care team and make appointments. If you have questions, please call your primary care clinic.  If you do not have a primary care provider, please call [phone] and they will assist you.        Care EveryWhere ID     This is your Care EveryWhere ID. This could be used by other organizations to access your [place] medical records  [id number]        Your Vitals Were     Pulse Temperature Last Period Pulse Oximetry BMI (Body Mass Index)       72 98.1  F (36.7  C) (Oral) (LMP Unknown) 98% 40.25 kg/m2        Blood Pressure from Last 3 Encounters:   04/03/17 111/64   03/31/17 107/61   03/03/17 116/62    Weight from Last 3 Encounters:   04/03/17 101.4 kg (223 lb 9.6 oz)   03/31/17 101.1 kg (222 lb 12.8 oz)   03/03/17 99.2 kg (218 lb 12.8 oz)              We Performed the Following     FETAL NON-STRESS TEST     INJECTION INTRAMUSCULAR OR SUB-Q          Today's Medication Changes          These changes are accurate as of: 4/3/17 11:59 PM.  If you have any questions, ask your nurse or doctor.               Start taking these medicines.        Dose/Directions    rho(D) immune  globulin 300 MCG injection   Commonly known as:  HYPERRHO/RHOGAM   Used for:  Rh negative state in antepartum period, third trimester   Started by:  Ravi Ashraf MD        Dose:  300 mcg   Inject 300 mcg into the muscle once for 1 dose   Quantity:  1 mcg   Refills:  0            Where to get your medicines      Some of these will need a paper prescription and others can be bought over the counter.  Ask your nurse if you have questions.     You don't need a prescription for these medications     rho(D) immune globulin 300 MCG injection                Primary Care Provider Office Phone # Fax #    Susanne Morales [phone]       [facility] [address] [facility] [address]        Goals        General    Psychosocial (pt-stated)     Notes - Note edited  9/20/2016  1:05 PM by Shahrzad Hernandez BSW    As of today's date 9/20/2016 goal is met at 26 - 50%.   Goal Status:  Discontinued   SW unable to contact pt X3  As of today's date 6/23/2016 goal is met at 26 - 50%.   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     Thank you for choosing [facility]  for your care. Our goal is always to provide you with excellent care. Hearing back from our patients is one way we can continue to improve our services. Please take a few minutes to complete the written survey that you may receive in the mail after your visit with us. Thank you!             Your Updated Medication List - Protect others around you: Learn how to safely use, store and throw away your medicines at www.disposemymeds.org.          This list is accurate as of: 4/3/17 11:59 PM.  Always use your most recent med list.                   Brand Name Dispense Instructions for use    albuterol 108 (90 BASE) MCG/ACT  Inhaler    PROAIR HFA/PROVENTIL HFA/VENTOLIN HFA    1 Inhaler    Inhale 2 puffs into the lungs every 4 hours as needed for shortness of breath / dyspnea or wheezing       calcium carbonate 500 MG chewable tablet    TUMS         meclizine 25 MG tablet    ANTIVERT    30 tablet    Take 1 tablet (25 mg) by mouth every 6 hours as needed for dizziness       PRENATAL VITAMIN PO      Take 1 tablet by mouth daily       rho(D) immune globulin 300 MCG injection    HYPERRHO/RHOGAM    1 mcg    Inject 300 mcg into the muscle once for 1 dose

## 2017-04-03 NOTE — PATIENT INSTRUCTIONS
If you have any questions regarding your visit, Please contact your care team.    Women s Health CLINIC HOURS TELEPHONE NUMBER   MD Rhonda García CMA Lisa -    ROCIO Correa RN       Monday:       7:30-4:30 [place]  Wednesday:       7:30-4:30 [place]  Thursday:       7:30-1:30 [place]  Friday:       7:30-11:30 [facility]  [address] [facility] [place]  [address]  [phone] ask for Women's [facility]  [address] [place] [address]  [phone] ask for [facility]    Imaging Scheduling for [place]:  [phone]    Imaging Scheduling for [place]: [phone]       Urgent Care locations:    [facility] Saturday and Sunday   9 am - 5 pm    Monday-Friday   12 pm - 8 pm  Saturday and Sunday   9 am - 5 pm   [phone]     [facility] Labor and Delivery:  [phone]    If you need a medication refill, please contact your pharmacy. Please allow 3 business days for your refill to be completed.  As always, Thank you for trusting us with your healthcare needs!

## 2017-04-03 NOTE — NURSING NOTE
"Chief Complaint   Patient presents with     Prenatal Care     27w2d       Initial /64  Pulse 72  Temp 98.1  F (36.7  C) (Oral)  Wt 223 lb 9.6 oz (101.4 kg)  LMP  (LMP Unknown)  SpO2 98%  BMI 40.25 kg/m2 Estimated body mass index is 40.25 kg/(m^2) as calculated from the following:    Height as of 3/31/17: 5' 2.5\" (1.588 m).    Weight as of this encounter: 223 lb 9.6 oz (101.4 kg).  Medication Reconciliation: complete   Rhonda Granados CMA      "

## 2017-04-03 NOTE — PROGRESS NOTES
Patient presents for routine prenatal visit at 27w2d.  Patient with complaint. Still having dizziness despite taking antivert.  The medication is controlling symptoms most of the time.  Decreased fetal movement  PE: See OB vitals  There is no height or weight on file to calculate BMI.    Questions asked and answered.    Decreased Fetal Movement  NST IS:  Reactive (2 accl > 15 BPM in 20 min., each lasting approx. 15 seconds)  NST Baseline Rate 145  Variability:  Average  Accelerations:Present  Variable Decelerations:No  Other Decelerations:No  Contractions: none    Further Comments:      Plans:  1 hour gtt  Continue antivert  Rhogam  Ravi Ashraf MD FACOG

## 2017-04-04 ENCOUNTER — E-VISIT (OUTPATIENT)
Dept: OBGYN | Facility: CLINIC | Age: 27
End: 2017-04-04

## 2017-04-04 DIAGNOSIS — Z98.84 STATUS POST GASTRIC BANDING: ICD-10-CM

## 2017-04-04 DIAGNOSIS — Z34.80 ENCOUNTER FOR SUPERVISION OF OTHER NORMAL PREGNANCY: Primary | ICD-10-CM

## 2017-04-04 NOTE — MR AVS SNAPSHOT
After Visit Summary   4/4/2017    Gloria Sun    MRN: [id number]           Patient Information     Date Of Birth          1990        Visit Information        Provider Department      4/4/2017 11:15 PM Ravi Ashraf MD [facility]        Today's Diagnoses     Encounter for supervision of other normal pregnancy    -  1    Status post gastric banding           Follow-ups after your visit        Additional Services     DIABETES EDUCATOR REFERRAL       DIABETES SELF MANAGEMENT TRAINING (DSMT)      Your provider has referred you to Diabetes Education: FMG: Diabetes Education - All [facility] [phone]   [email or web address]    Type of training and number of hours: New Diagnosis: Initial group DSMT - 10 hours.      Medicare covers: 10 hours of initial DSMT in 12 month period from the time of first visit, plus 2 hours of follow-up DSMT annually, and additional hours as requested for insulin training.    Diabetes Type: Gestational Diabetes             Diabetes Co-Morbidities: none                    A1C is: Lab Results       Component                Value               Date                       A1C                      5.1                 12/29/2014              If an urgent visit is needed or A1C is above 12, Care Team to call the Diabetes Education Team at [phone] or send an In Basket message to the Diabetes Education Pool (P DIAB ED-PATIENT CARE).    Diabetes Education Topics: Comprehensive Knowledge Assessment and Instruction and Blood glucose meter instruction     Special Educational Needs Requiring Individual DSMT: None       MEDICAL NUTRITION THERAPY (MNT) for Diabetes    Medical Nutrition Therapy with a Registered Dietitian can be provided in coordination with Diabetes Self-Management Training to assist in achieving optimal diabetes management.    MNT Type and Hours:                   Medicare will cover: 3 hours  initial MNT in 12 month period after first visit, plus 2 hours of follow-up MNT annually    Please be aware that coverage of these services is subject to the terms and limitations of your health insurance plan.  Call member services at your health plan to determine Diabetes Self-Management Training benefits and ask which blood glucose monitor brands are covered by your plan.      Please bring the following with you to your appointment:    (1)  List of current medications   (2)  List of Blood Glucose Monitor brands that are covered by your insurance plan  (3)  Blood Glucose Monitor and log book  (4)   Food records for the 3 days prior to your visit    The Certified Diabetes Educator may make diabetes medication adjustments per the CDE Protocol and Collaborative Practice Agreement.                  Who to contact     If you have questions or need follow up information about today's clinic visit or your schedule please contact [facility] directly at [phone].  Normal or non-critical lab and imaging results will be communicated to you by MyChart, letter or phone within 4 business days after the clinic has received the results. If you do not hear from us within 7 days, please contact the clinic through [employer] or phone. If you have a critical or abnormal lab result, we will notify you by phone as soon as possible.  Submit refill requests through [employer] or call your pharmacy and they will forward the refill request to us. Please allow 3 business days for your refill to be completed.          Additional Information About Your Visit        [employer] Information     [employer] gives you secure access to your electronic health record. If you see a primary care provider, you can also send messages to your care team and make appointments. If you have questions, please call your primary care clinic.  If you do not have a primary care provider, please call [phone] and they will assist you.        Care  EveryWhere ID     This is your Care EveryWhere ID. This could be used by other organizations to access your [facility] medical records  [id number]        Your Vitals Were     Last Period                   (LMP Unknown)            Blood Pressure from Last 3 Encounters:   04/03/17 111/64   03/31/17 107/61   03/03/17 116/62    Weight from Last 3 Encounters:   04/03/17 101.4 kg (223 lb 9.6 oz)   03/31/17 101.1 kg (222 lb 12.8 oz)   03/03/17 99.2 kg (218 lb 12.8 oz)              We Performed the Following     DIABETES EDUCATOR REFERRAL        Primary Care Provider Office Phone # Fax #    Susanne Morales [phone]       [place] [id number] [facility] [address]        Goals        General    Psychosocial (pt-stated)     Notes - Note edited  9/20/2016  1:05 PM by Shahrzad Hernandez BSW    As of today's date 9/20/2016 goal is met at 26 - 50%.   Goal Status:  Discontinued   SW unable to contact pt X3  As of today's date 6/23/2016 goal is met at 26 - 50%.   Goal Status:  Active   Pt's mother is continuing to work with [facility] [employer] worker to get her 2 year old son onto Medical Assistance                                                                                  BOLA Gauthier          Thank you!     [facility] you for [facility]  [facility]. Our goal is always to provide you with excellent care. Hearing back from our patients is one way we can continue to improve our services. Please take a few minutes to complete the written survey that you may receive in the mail after your visit with us. Thank you!             Your Updated Medication List - Protect others around you: Learn how to safely use, store and throw away your medicines at www.disposemymeds.org.          This list is accurate as of: 4/4/17 11:59 PM.  Always use your most recent med list.                   Brand Name Dispense Instructions for use    albuterol 108 (90 BASE) MCG/ACT Inhaler    PROAIR  HFA/PROVENTIL HFA/VENTOLIN HFA    1 Inhaler    Inhale 2 puffs into the lungs every 4 hours as needed for shortness of breath / dyspnea or wheezing       calcium carbonate 500 MG chewable tablet    TUMS         meclizine 25 MG tablet    ANTIVERT    30 tablet    Take 1 tablet (25 mg) by mouth every 6 hours as needed for dizziness       PRENATAL VITAMIN PO      Take 1 tablet by mouth daily

## 2017-04-05 NOTE — PROGRESS NOTES
Patient with abnormal 1 hour gtt, but due to gastric bypass, unable to do 3 hour gtt.  We will have her see diabetic education and pattern sugars, to evaluate if she truly has gestational diabetes.  Ravi Ashraf MD FACOG

## 2017-04-25 ENCOUNTER — TELEPHONE (OUTPATIENT)
Dept: EDUCATION SERVICES | Facility: CLINIC | Age: 27
End: 2017-04-25

## 2017-04-25 NOTE — TELEPHONE ENCOUNTER
"Scheduling reached out to writer in regards to patient question on reason for diabetes education appointment scheduled for 5/15.    Scheduling reviewed the following with patient (though not certain of the amount of detail they provided to patient over the phone):  She had the 1 hour oral glucose tolerance test which was above target. She is unable to do the 3 hour oral glucose tolerance test due to history of gastric banding. Per MD gestational diabetes is NOT yet confirmed- however, after week 24 of pregnancy, hormones increase insulin resistance and 1/14 moms have gestational diabetes as a result. Since she is unable to do the 3 hour OGTT she will meet with CDE and will learn how to check her BG and we can see if she has GDM based off of her results. Typically she will check for a week, follow-up, and if she has it will continue to check. If not she may need to spot check and then recheck for another week around 33 weeks. Other dietary education can be reviewed at this time as well. It would be a good idea to schedule a 1 hour follow-up 1 week from her initial so we can review the results if she s willing     When scheduling team told patient she would be learning how to check her blood sugar to see what her glucose levels are, patient stated \"that is not going to happen.\"     Will route to MD as patient is seeing MD on Friday  Only other way to determine BG levels would be professional CGM though insurance would likely not cover without diabetes dx.    Lindsay Haskins RD, LD, CDE    "

## 2017-04-25 NOTE — TELEPHONE ENCOUNTER
Noted, patient seeing me on Friday and we can discuss this at that visit and explain rationale for recommendation on blood sugar monitoring. Arianna FAROOQ CNP

## 2017-04-28 ENCOUNTER — PRENATAL OFFICE VISIT (OUTPATIENT)
Dept: OBGYN | Facility: CLINIC | Age: 27
End: 2017-04-28
Payer: COMMERCIAL

## 2017-04-28 VITALS
DIASTOLIC BLOOD PRESSURE: 64 MMHG | HEART RATE: 71 BPM | TEMPERATURE: 96.8 F | WEIGHT: 228.4 LBS | BODY MASS INDEX: 40.47 KG/M2 | SYSTOLIC BLOOD PRESSURE: 103 MMHG | HEIGHT: 63 IN

## 2017-04-28 DIAGNOSIS — Z34.80 ENCOUNTER FOR SUPERVISION OF OTHER NORMAL PREGNANCY: Primary | ICD-10-CM

## 2017-04-28 PROCEDURE — 99207 ZZC PRENATAL VISIT: CPT | Performed by: NURSE PRACTITIONER

## 2017-04-28 ASSESSMENT — PAIN SCALES - GENERAL: PAINLEVEL: NO PAIN (0)

## 2017-04-28 NOTE — NURSING NOTE
"Chief Complaint   Patient presents with     Prenatal Care     30w6d       Initial /64  Pulse 71  Temp 96.8  F (36  C) (Oral)  Ht 5' 2.5\" (1.588 m)  Wt 228 lb 6.4 oz (103.6 kg)  LMP  (LMP Unknown)  BMI 41.11 kg/m2 Estimated body mass index is 41.11 kg/(m^2) as calculated from the following:    Height as of this encounter: 5' 2.5\" (1.588 m).    Weight as of this encounter: 228 lb 6.4 oz (103.6 kg)..  BP completed using cuff size: jean Adhikari CMA      "

## 2017-04-28 NOTE — MR AVS SNAPSHOT
After Visit Summary   4/28/2017    Gloria Sun    MRN: [id number]           Patient Information     Date Of Birth          1990        Visit Information        Provider Department      4/28/2017 11:50 AM Arianna Pierre APRN CNP [facility]        Today's Diagnoses     Encounter for supervision of other normal pregnancy    -  1       Follow-ups after your visit        Your next 10 appointments already scheduled     May 04, 2017  8:15 AM CDT   ESTABLISHED PRENATAL with Ravi Ashraf MD   [facility]    [address] [facility] [id number] [phone]            May 12, 2017 11:10 AM CDT   ESTABLISHED PRENATAL with OSEAS Kenny CNP   [facility]    [address] [facility] [address] [phone]            May 15, 2017  8:00 AM CDT   Diabetic Education with  DIABETIC ED RESOURCE   [facility] ([facility])    [address] [place] [address]  [place] [address] [phone]              Who to contact     If you have questions or need follow up information about today's clinic visit or your schedule please contact [facility] directly at [phone].  Normal or non-critical lab and imaging results will be communicated to you by MyChart, letter or phone within 4 business days after the clinic has received the results. If you do not hear from us within 7 days, please contact the clinic through MyChart or phone. If you have a critical or abnormal lab result, we will notify you by phone as soon as possible.  Submit refill requests through Connect or call your pharmacy and they will forward the refill request to us. Please allow 3 business days for your refill to be completed.          Additional Information About Your Visit        [employer] Information     Connect gives you secure access to your electronic health record. If you  "see a primary care provider, you can also send messages to your care team and make appointments. If you have questions, please call your primary care clinic.  If you do not have a primary care provider, please call [phone] and they will assist you.        Care EveryWhere ID     This is your Care EveryWhere ID. This could be used by other organizations to access your [facility] medical records  [id number]        Your Vitals Were     Pulse Temperature Height Last Period BMI (Body Mass Index)       71 96.8  F (36  C) (Oral) 5' 2.5\" (1.588 m) (LMP Unknown) 41.11 kg/m2        Blood Pressure from Last 3 Encounters:   04/28/17 103/64   04/03/17 111/64   03/31/17 107/61    Weight from Last 3 Encounters:   04/28/17 228 lb 6.4 oz (103.6 kg)   04/03/17 223 lb 9.6 oz (101.4 kg)   03/31/17 222 lb 12.8 oz (101.1 kg)              Today, you had the following     No orders found for display       Primary Care Provider Office Phone # Fax #    Susanne Morales [phone]       [place] [id number] [facility] [address]        Goals        General    Psychosocial (pt-stated)     Notes - Note edited  9/20/2016  1:05 PM by Shahrzad Hernandez BSW    As of today's date 9/20/2016 goal is met at 26 - 50%.   Goal Status:  Discontinued   SW unable to contact pt X3  As of today's date 6/23/2016 goal is met at 26 - 50%.   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     Thank you for choosing [facility]  for your care. Our goal is always to provide you with excellent care. Hearing back from our patients is one way we can continue to improve our services. Please take a few minutes to complete the written survey that you may receive in the mail after your visit with us. Thank you!      "        Your Updated Medication List - Protect others around you: Learn how to safely use, store and throw away your medicines at www.disposemymeds.org.          This list is accurate as of: 4/28/17 12:08 PM.  Always use your most recent med list.                   Brand Name Dispense Instructions for use    albuterol 108 (90 BASE) MCG/ACT Inhaler    PROAIR HFA/PROVENTIL HFA/VENTOLIN HFA    1 Inhaler    Inhale 2 puffs into the lungs every 4 hours as needed for shortness of breath / dyspnea or wheezing       calcium carbonate 500 MG chewable tablet    TUMS         meclizine 25 MG tablet    ANTIVERT    30 tablet    Take 1 tablet (25 mg) by mouth every 6 hours as needed for dizziness       PRENATAL VITAMIN PO      Take 1 tablet by mouth daily

## 2017-04-28 NOTE — TELEPHONE ENCOUNTER
Discussed recommendations with patient at appointment. After explaining rationale for testing and meeting with Diabetes Education, will keep her scheduled appointment on 5/15/17. Arianna FAROOQ CNP

## 2017-04-28 NOTE — PROGRESS NOTES
Patient presents for routine prenatal visit. Prenatal flowsheet reviewed and updated as needed.  Denies vaginal bleeding, loss of fluid, contractions or cramping.  Patient without complaint. We discussed her concerns about meeting with Diabetes Education and rationale for their recommendations for testing blood sugars x 1 week now and again in a few weeks to assess for GDM as she is unable to do the 3 our test. Patient is amenable to this and will keep her upcoming appointment.    Prefers Tdap on return to clinic.    Advice as per Anticipatory Guidance/Checklist updated.  PE: See OB vitals    Questions asked and answered. Next OB visit in 2 week(s) with Dr. Ashraf.    Arianna FAROOQ CNP

## 2017-04-30 ENCOUNTER — OFFICE VISIT (OUTPATIENT)
Dept: URGENT CARE | Facility: URGENT CARE | Age: 27
End: 2017-04-30
Payer: COMMERCIAL

## 2017-04-30 VITALS
HEART RATE: 72 BPM | DIASTOLIC BLOOD PRESSURE: 55 MMHG | TEMPERATURE: 97.4 F | OXYGEN SATURATION: 98 % | WEIGHT: 225.6 LBS | BODY MASS INDEX: 40.61 KG/M2 | SYSTOLIC BLOOD PRESSURE: 112 MMHG

## 2017-04-30 DIAGNOSIS — J45.31 MILD PERSISTENT ASTHMA WITH ACUTE EXACERBATION: Primary | ICD-10-CM

## 2017-04-30 DIAGNOSIS — Z3A.31 31 WEEKS GESTATION OF PREGNANCY: ICD-10-CM

## 2017-04-30 PROCEDURE — 99213 OFFICE O/P EST LOW 20 MIN: CPT | Performed by: FAMILY MEDICINE

## 2017-04-30 RX ORDER — ALBUTEROL SULFATE 0.83 MG/ML
1 SOLUTION RESPIRATORY (INHALATION) EVERY 6 HOURS PRN
Qty: 25 VIAL | Refills: 0 | Status: SHIPPED | OUTPATIENT
Start: 2017-04-30 | End: 2018-09-27

## 2017-04-30 NOTE — MR AVS SNAPSHOT
After Visit Summary   2017    Gloria Sun    MRN: [id number]           Patient Information     Date Of Birth          1990        Visit Information        Provider Department      2017 2:40 PM Sameer Gary MD [facility]        Today's Diagnoses     Mild persistent asthma with acute exacerbation    -  1    31 weeks gestation of pregnancy          Care Instructions      Asthma and Pregnancy  Now that you re pregnant, you may be concerned about how asthma will affect your health and the health of your baby. But asthma doesn t have to stop you from having a healthy pregnancy. Managing your asthma can keep you and your baby healthy.     During your pregnancy, work with your healthcare provider to keep your asthma under good control.    Why managing asthma is important during pregnancy  When you re pregnant and have an asthma flare-up, it affects both you and your baby. The baby gets oxygen from your blood to grow and develop normally. Severe asthma can cause problems getting oxygen to your baby. When asthma isn t controlled, problems that can develop include:    Baby being born too early (prematurity)    Need to deliver by     Baby being smaller than normal    High blood pressure and/or preeclampsia in the mother  Work with your healthcare providers to manage your asthma  You likely have a healthcare provider (HCP) who helps you manage your asthma. During your pregnancy, continue to see this HCP regularly. He or she can continue to monitor your asthma. And medicines can be adjusted as needed. Be sure that this HCP is in contact with the HCP who is caring for your pregnancy. Also be sure both providers know what asthma medicines you take. If you don t have an HCP taking care of your asthma, tell the provider who cares for your pregnancy.  Prevent flare-ups  Here are tips to prevent flare-ups:    Continue using asthma medicines as prescribed.  Follow your  HCP s instructions about using asthma medicines. These will likely be inhaled medicines. These have little or no chance of harming you or your baby.      Monitor your lung function. Lung function tests help measure how well your lungs are working. The test results tell you and your providers whether you are getting enough oxygen. You may be tested at your provider s office or at a hospital. You may also be instructed to monitor yourself at home. This is done using a peak flow meter. Your provider will tell you when and how often you need to use the meter.     Control asthma triggers. These are things that cause your airways to react and lead to an asthma attack (flare-up). Triggers can include smoke, scents, and chemicals. They also include allergies to things like pollen, pets, and dust mites. A flare-up can also be triggered by exercise and changes in the weather. Having a cold or the flu can also trigger a flare-up. To prevent the flu, get a flu shot. If you ve been getting allergy shots, you should continue to do so. However, you should not get allergy shots for the first time when you re pregnant.  Monitor the health of your baby  Your HCP will monitor your baby s health closely during your pregnancy. If your asthma is not well controlled, this becomes even more important. So be sure to keep all your prenatal appointments. Monitoring includes:    Regular ultrasound tests. Ultrasound is a safe test that allows you and your HCP to see an image of your baby in the womb. The ultrasound shows your baby s development, including whether the baby s organs are growing normally.    Fetal nonstress test. This test may be done when you are around your third trimester. It checks if your baby is receiving enough oxygen by monitoring the baby s heart rate. Normally, a baby s heart rate goes up when the baby moves. If the baby s activity is low, it may mean that the baby isn t getting enough oxygen.    Fetal movement counting.  Your HCP may tell you to track your baby s movements by doing  fetal kick counts.  This is done by counting the number of movements (kicks) that the baby makes over a certain period. Your provider will let you know how often to count. You ll also be told when you should call him or her. If the baby s movement pattern changes or decreases, more tests will likely be done to check the baby s health.  Know your plan for labor and delivery  Before your due date, talk with your HCP about your labor and delivery plan. You will likely continue taking your asthma medicines during this time. These prevent a flare-up. They can also help relieve a flare-up if you have one. Your provider will tell you more about this.  When to call your healthcare provider  Call your HCP right away if any of the following happen:    You have wheezing that does not go away after you take medicine.    Your asthma medicines stop working.    You cough up bloody, green, or yellow mucus (signs of a lung infection).    You develop a temperature above 100.4 F (38 C) with shortness of breath or a cough.    Your baby s movement pattern changes or decreases.     2953-1405 [employer]. [address] [place] [address]. All rights reserved. This information is not intended as a substitute for professional medical care. Always follow your healthcare professional's instructions.        Controlling Your Asthma  You can do a lot to manage your asthma and improve your quality of life. You will need to work with your health care provider to develop a plan. But it s up to you to put this plan into action.  Why You Need to Take Control  You need to control the inflammation in your lungs. You also need to relieve symptoms when you have them. These are long-term tasks. But the more you stay in control, the better you ll feel. If you don t stay in control:    Asthma symptoms may cause you to miss school, work, or activities that you  enjoy.    Asthma flare-ups can be dangerous, even deadly.    Uncontrolled asthma makes it more likely that you will need emergency department and in-hospital care.    Uncontrolled asthma may cause permanent damage to your lungs.    Peak flow monitoring helps measure how open your airways are.   Taking medication helps you control your asthma and relieve symptoms when they occur.     Using an Asthma Action Plan will help you keep track of and respond to asthma symptoms.   Avoiding triggers--the things that inflame your airways--will help prevent symptoms and flare-ups.   Your Action Plan  Your health care provider will help you prepare, and when needed, update and Asthma Action Plan. Your plan tells you what to do based on your current symptoms. If you don't have an Asthma Action Plan, or if yours isn't up-to-date, make sure you talk with your health care provider.    8524-7731 [employer]. [address] [place] [address]. All rights reserved. This information is not intended as a substitute for professional medical care. Always follow your healthcare professional's instructions.        Patient Education    Albuterol Pressurized inhalation, suspension    Albuterol Sulfate Inhalation powder    Albuterol Sulfate Nebulizer solution    Albuterol Sulfate Oral syrup    Albuterol Sulfate Oral tablet    Albuterol Sulfate Oral tablet, extended-release  Albuterol Sulfate Nebulizer solution  What is this medicine?  ALBUTEROL (al BYOO ter ole) is a bronchodilator. It helps to open up the airways in your lungs to make it easier to breathe. This medicine is used to treat and to prevent bronchospasm.  This medicine may be used for other purposes; ask your health care provider or pharmacist if you have questions.  What should I tell my health care provider before I take this medicine?  They need to know if you have any of the following conditions:    diabetes    heart disease or irregular heartbeat    high  blood pressure    pheochromocytoma    seizures    thyroid disease    an unusual or allergic reaction to albuterol, levalbuterol, sulfites, other medicines, foods, dyes, or preservatives    pregnant or trying to get pregnant    breast-feeding  How should I use this medicine?  This medicine is used in a nebulizer. Nebulizers make a liquid into an aerosol that you breathe in through your mouth or your mouth and nose into your lungs. You will be taught how to use your nebulizer. Follow the directions on your prescription label. Take your medicine at regular intervals. Do not use more often than directed.  Talk to your pediatrician regarding the use of this medicine in children. Special care may be needed.  Overdosage: If you think you have taken too much of this medicine contact a poison control center or emergency room at once.  NOTE: This medicine is only for you. Do not share this medicine with others.  What if I miss a dose?  If you miss a dose, use it as soon as you can. If it is almost time for your next dose, use only that dose. Do not use double or extra doses.  What may interact with this medicine?    anti-infectives like chloroquine and pentamidine    caffeine    cisapride    diuretics    medicines for colds    medicines for depression or emotional or psychotic conditions    medicines for weight loss including some herbal products    methadone    some antibiotics like clarithromycin, erythromycin, levofloxacin, and linezolid    some heart medicines    steroid hormones like dexamethasone, cortisone, hydrocortisone    theophylline    thyroid hormones  This list may not describe all possible interactions. Give your health care provider a list of all the medicines, herbs, non-prescription drugs, or dietary supplements you use. Also tell them if you smoke, drink alcohol, or use illegal drugs. Some items may interact with your medicine.  What should I watch for while using this medicine?  Tell your doctor or health  care professional if your symptoms do not improve. Do not use extra albuterol. Call your doctor right away if your asthma or bronchitis gets worse while you are using this medicine.  If your mouth gets dry try chewing sugarless gum or sucking hard candy. Drink water as directed.  What side effects may I notice from receiving this medicine?  Side effects that you should report to your doctor or health care professional as soon as possible:    allergic reactions like skin rash, itching or hives, swelling of the face, lips, or tongue    breathing problems    chest pain    feeling faint or lightheaded, falls    high blood pressure    irregular heartbeat    fever    muscle cramps or weakness    pain, tingling, numbness in the hands or feet    vomiting  Side effects that usually do not require medical attention (report to your doctor or health care professional if they continue or are bothersome):    cough    difficulty sleeping    headache    nervousness, trembling    stomach upset    stuffy or runny nose    throat irritation    unusual taste  This list may not describe all possible side effects. Call your doctor for medical advice about side effects. You may report side effects to FDA at 0-131-FDA-6982.  Where should I keep my medicine?  Keep out of the reach of children.  Store between 2 and 25 degrees C (36 and 77 degrees F). Do not freeze. Protect from light. Throw away any unused medicine after the expiration date. Most products are kept in the foil package until time of use. Some products can be used up to 1 week after they are removed from the foil pouch. Check the instructions that come with your medicine.  NOTE: This sheet is a summary. It may not cover all possible information. If you have questions about this medicine, talk to your doctor, pharmacist, or health care provider.  NOTE:This sheet is a summary. It may not cover all possible information. If you have questions about this medicine, talk to your  doctor, pharmacist, or health care provider. Copyright  2016 [employer]              Follow-ups after your visit        Your next 10 appointments already scheduled     May 04, 2017  8:15 AM CDT   ESTABLISHED PRENATAL with Ravi Ashraf MD   [facility]    [address] [facility] [address] [phone]            May 15, 2017  8:00 AM CDT   Diabetic Education with  DIABETIC ED RESOURCE   [facility]    [address] [facility] [facility]  [place] [address] [phone]              Who to contact     If you have questions or need follow up information about today's clinic visit or your schedule please contact [facility] directly at [phone].  Normal or non-critical lab and imaging results will be communicated to you by MyChart, letter or phone within 4 business days after the clinic has received the results. If you do not hear from us within 7 days, please contact the clinic through [employer] or phone. If you have a critical or abnormal lab result, we will notify you by phone as soon as possible.  Submit refill requests through [employer] or call your pharmacy and they will forward the refill request to us. Please allow 3 business days for your refill to be completed.          Additional Information About Your Visit        [employer] Information     [employer] gives you secure access to your electronic health record. If you see a primary care provider, you can also send messages to your care team and make appointments. If you have questions, please call your primary care clinic.  If you do not have a primary care provider, please call [phone] and they will assist you.        Care EveryWhere ID     This is your Care EveryWhere ID. This could be used by other organizations to access your [facility] medical records  [id number]        Your Vitals Were     Pulse Temperature Last Period Pulse Oximetry BMI (Body  Mass Index)       72 97.4  F (36.3  C) (Oral) (LMP Unknown) 98% 40.61 kg/m2        Blood Pressure from Last 3 Encounters:   04/30/17 112/55   04/28/17 103/64   04/03/17 111/64    Weight from Last 3 Encounters:   04/30/17 225 lb 9.6 oz (102.3 kg)   04/28/17 228 lb 6.4 oz (103.6 kg)   04/03/17 223 lb 9.6 oz (101.4 kg)              Today, you had the following     No orders found for display         Today's Medication Changes          These changes are accurate as of: 4/30/17  3:24 PM.  If you have any questions, ask your nurse or doctor.               These medicines have changed or have updated prescriptions.        Dose/Directions    * albuterol 108 (90 BASE) MCG/ACT Inhaler   Commonly known as:  PROAIR HFA/PROVENTIL HFA/VENTOLIN HFA   This may have changed:  Another medication with the same name was added. Make sure you understand how and when to take each.   Used for:  Intermittent asthma, uncomplicated   Changed by:  Susanne Moralse MD        Dose:  2 puff   Inhale 2 puffs into the lungs every 4 hours as needed for shortness of breath / dyspnea or wheezing   Quantity:  1 Inhaler   Refills:  2       * albuterol (2.5 MG/3ML) 0.083% neb solution   This may have changed:  You were already taking a medication with the same name, and this prescription was added. Make sure you understand how and when to take each.   Used for:  Mild persistent asthma with acute exacerbation   Changed by:  Sameer Gary MD        Dose:  1 vial   Take 1 vial (2.5 mg) by nebulization every 6 hours as needed for shortness of breath / dyspnea or wheezing   Quantity:  25 vial   Refills:  0       * Notice:  This list has 2 medication(s) that are the same as other medications prescribed for you. Read the directions carefully, and ask your doctor or other care provider to review them with you.         Where to get your medicines      These medications were sent to [facility]/pharmacy #[id number] - [place], [place] - [address]  [facility] [address]     Phone:  [phone]     albuterol (2.5 MG/3ML) 0.083% neb solution                Primary Care Provider Office Phone # Fax #    Susanne Morales [phone]       [address]  [place] [address]        Goals        General    Psychosocial (pt-stated)     Notes - Note edited  9/20/2016  1:05 PM by Shahrzad Hernandez BSW    As of today's date 9/20/2016 goal is met at 26 - 50%.   Goal Status:  Discontinued   SW unable to contact pt X3  As of today's date 6/23/2016 goal is met at 26 - 50%.   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     Thank you for choosing [facility]  for your care. Our goal is always to provide you with excellent care. Hearing back from our patients is one way we can continue to improve our services. Please take a few minutes to complete the written survey that you may receive in the mail after your visit with us. Thank you!             Your Updated Medication List - Protect others around you: Learn how to safely use, store and throw away your medicines at www.disposemymeds.org.          This list is accurate as of: 4/30/17  3:24 PM.  Always use your most recent med list.                   Brand Name Dispense Instructions for use    * albuterol 108 (90 BASE) MCG/ACT Inhaler    PROAIR HFA/PROVENTIL HFA/VENTOLIN HFA    1 Inhaler    Inhale 2 puffs into the lungs every 4 hours as needed for shortness of breath / dyspnea or wheezing       * albuterol (2.5 MG/3ML) 0.083% neb solution     25 vial    Take 1 vial (2.5 mg) by nebulization every 6 hours as needed for shortness of breath / dyspnea or wheezing       calcium carbonate 500 MG chewable tablet    TUMS         fluticasone-salmeterol 100-50 MCG/DOSE  diskus inhaler    ADVAIR     Inhale 1 puff into the lungs every 12 hours       meclizine 25 MG tablet    ANTIVERT    30 tablet    Take 1 tablet (25 mg) by mouth every 6 hours as needed for dizziness       PRENATAL VITAMIN PO      Take 1 tablet by mouth daily       * Notice:  This list has 2 medication(s) that are the same as other medications prescribed for you. Read the directions carefully, and ask your doctor or other care provider to review them with you.

## 2017-04-30 NOTE — PROGRESS NOTES
SUBJECTIVE:                                                    Gloria Sun is a 26 year old female who presents to clinic today for the following health issues:      Requesting nebulizer  Been sick for the last 2 days and harder to breathe. Every time pt is ill it is harder to breathe since pregnancy. Complains of worsening SOB/wheezing for last 2 days associated with mild cough. No fever, chills, sore throat, chest pain, palpitation or other relevant systemic symptoms. Feels like asthma flaring. She is taking albuterol and advair inhaler. She is suing albuterol inhaler about every 6 hours for last 2 days.       Problem list and histories reviewed & adjusted, as indicated.  Additional history: as documented    Patient Active Problem List   Diagnosis     CARDIOVASCULAR SCREENING; LDL GOAL LESS THAN 160     Chronic jaw pain     Status post gastric banding     Gilbert's syndrome     Generalized hyperhidrosis     Obesity     Intermittent asthma, uncomplicated     Health Care Home     Post concussion syndrome     Need for Tdap vaccination     Encounter for supervision of other normal pregnancy     Rh negative state in antepartum period     ASCUS with positive high risk HPV cervical     Past Surgical History:   Procedure Laterality Date     ESOPHAGOSCOPY, GASTROSCOPY, DUODENOSCOPY (EGD), COMBINED Left 12/31/2014    Procedure: COMBINED ESOPHAGOSCOPY, GASTROSCOPY, DUODENOSCOPY (EGD), BIOPSY SINGLE OR MULTIPLE;  Surgeon: Ilan Marrero MD;  Location: [facility]     LAPAROSCOPIC CHOLECYSTECTOMY  2/21/2014    Procedure: LAPAROSCOPIC CHOLECYSTECTOMY;  Laparoscopic Cholecystectomy;  Surgeon: Ilan Marrero MD;  Location:  OR     LAPAROSCOPIC GASTRIC SLEEVE  1/21/2013    Procedure: LAPAROSCOPIC GASTRIC SLEEVE;  Laparoscopic Sleeve Gastrectomy;  Surgeon: Chato Mathews MD;  Location:  OR     MOUTH SURGERY  2009       Social History   Substance Use Topics     Smoking status: Never Smoker     Smokeless  tobacco: Never Used     Alcohol use No     Family History   Problem Relation Age of Onset     Lipids Mother      Anxiety Disorder Mother      Depression Mother      Hypertension Mother      Obesity Mother      CANCER Father      skin     DIABETES Father      Hypertension Father      Obesity Father      Hypertension Maternal Grandmother      C.A.D. Maternal Grandmother      CABG     Lipids Maternal Grandmother      Depression Maternal Grandmother      Obesity Maternal Grandmother      C.A.D. Maternal Grandfather      CABG, MI      Asthma Maternal Grandfather      CANCER Maternal Grandfather      Respiratory Maternal Grandfather      Lipids Maternal Grandfather      Hypertension Maternal Grandfather      Alzheimer Disease Maternal Grandfather      Depression Maternal Grandfather      Obesity Maternal Grandfather      CEREBROVASCULAR DISEASE Paternal Grandmother      Arthritis Paternal Grandmother      d. lupus     Obesity Paternal Grandmother      HEART DISEASE Paternal Grandfather      Lipids Paternal Grandfather      Thyroid Disease No family hx of      Glaucoma No family hx of      Macular Degeneration No family hx of          Current Outpatient Prescriptions   Medication Sig Dispense Refill     calcium carbonate (TUMS) 500 MG chewable tablet        meclizine (ANTIVERT) 25 MG tablet Take 1 tablet (25 mg) by mouth every 6 hours as needed for dizziness 30 tablet 0     Prenatal Vit-Fe Fumarate-FA (PRENATAL VITAMIN PO) Take 1 tablet by mouth daily       albuterol (PROAIR HFA, PROVENTIL HFA, VENTOLIN HFA) 108 (90 BASE) MCG/ACT inhaler Inhale 2 puffs into the lungs every 4 hours as needed for shortness of breath / dyspnea or wheezing 1 Inhaler 2     Allergies   Allergen Reactions     Codeine Nausea and Vomiting     Desogen [Apri]      Hot flashes     Desogestrel-Ethinyl Estradiol Nausea     Dust Mites      Fluoxetine      Irritable, easy bruising     Magnesium Sulfate Injection      Burning, hotflashes      "Medroxyprogesterone Unknown     Swelling at injection site     Morphine      \"Weight loss surgery so it burns the inside of my stomach\"     Venlafaxine      Agitation, anxiety      Recent Labs   Lab Test  03/28/16   1045  11/04/15   1324  12/29/14   1218  02/25/14   1842  02/20/14   1742   11/18/13   1136  07/18/13   1507   02/27/13   1102   01/13/10   1135   A1C   --    --   5.1   --   5.4   --    --   4.7   < >   --    --    --    LDL   --    --   68   --   133*   --   117   --    < >   --    < >   --    HDL   --    --   32*   --   41*   --   45*   --    < >   --    < >   --    TRIG   --    --   103   --   167*   --   174*   --    < >   --    < >   --    ALT   --   16  165*  55*  144*   --   18  21   < >   --    < >   --    CR  0.71  0.70   --   0.82  0.87   < >  0.49*  0.49*   < >   --    < >   --    GFRESTIMATED  >90  Non  GFR Calc    >90  Non  GFR Calc     --   87  81   < >  >90  >90   < >   --    < >   --    GFRESTBLACK  >90   GFR Calc    >90   GFR Calc     --   >90  >90   < >  >90  >90   < >   --    < >   --    POTASSIUM  3.8  3.9   --   4.0  3.2*   < >  3.7  3.7   < >   --    < >   --    TSH   --    --    --    --    --    --    --    --    --   1.11   --   1.71    < > = values in this interval not displayed.      BP Readings from Last 3 Encounters:   04/30/17 112/55   04/28/17 103/64   04/03/17 111/64    Wt Readings from Last 3 Encounters:   04/30/17 225 lb 9.6 oz (102.3 kg)   04/28/17 228 lb 6.4 oz (103.6 kg)   04/03/17 223 lb 9.6 oz (101.4 kg)                  Labs reviewed in EPIC    Reviewed and updated as needed this visit by clinical staff  Tobacco  Allergies  Meds       Reviewed and updated as needed this visit by Provider         ROS:  Constitutional, HEENT, cardiovascular, pulmonary, gi and gu systems are negative, except as otherwise noted.    OBJECTIVE:                                                    /55 (BP Location: " Left arm, Patient Position: Chair, Cuff Size: Adult Large)  Pulse 72  Temp 97.4  F (36.3  C) (Oral)  Wt 225 lb 9.6 oz (102.3 kg)  LMP  (LMP Unknown)  SpO2 98%  BMI 40.61 kg/m2  Body mass index is 40.61 kg/(m^2).  GENERAL: healthy, alert and no distress  EYES: Eyes grossly normal to inspection, PERRL and conjunctivae and sclerae normal  HENT: ear canals and TM's normal, nose and mouth without ulcers or lesions  NECK: no adenopathy, no asymmetry, masses, or scars and thyroid normal to palpation  RESP: lungs clear to auscultation - no rales, rhonchi or wheezes  CV: regular rate and rhythm, normal S1 S2, no S3 or S4, no murmur, click or rub  ABDOMEN: soft, nontender, no hepatosplenomegaly, no masses and bowel sounds normal  MS: no gross musculoskeletal defects noted.  No calf swelling bilaterally       ASSESSMENT/PLAN:                                                          ICD-10-CM    1. Mild persistent asthma with acute exacerbation J45.31 albuterol (2.5 MG/3ML) 0.083% neb solution   2. 31 weeks gestation of pregnancy Z3A.31      Symptoms are likely secondary to mild asthma exacerbation. Albuterol nebs provided and suggested to continue Advair inhaler. Shared decision was made not start oral steroids for now. Suggested to discuss with obstetrician about further management options during her next visit. Patient understood and in agreement with the above plan. All questions are answered.       MEDICATIONS:   Orders Placed This Encounter   Medications     fluticasone-salmeterol (ADVAIR) 100-50 MCG/DOSE diskus inhaler     Sig: Inhale 1 puff into the lungs every 12 hours     albuterol (2.5 MG/3ML) 0.083% neb solution     Sig: Take 1 vial (2.5 mg) by nebulization every 6 hours as needed for shortness of breath / dyspnea or wheezing     Dispense:  25 vial     Refill:  0     Patient Instructions       Asthma and Pregnancy  Now that you re pregnant, you may be concerned about how asthma will affect your health and the  health of your baby. But asthma doesn t have to stop you from having a healthy pregnancy. Managing your asthma can keep you and your baby healthy.     During your pregnancy, work with your healthcare provider to keep your asthma under good control.    Why managing asthma is important during pregnancy  When you re pregnant and have an asthma flare-up, it affects both you and your baby. The baby gets oxygen from your blood to grow and develop normally. Severe asthma can cause problems getting oxygen to your baby. When asthma isn t controlled, problems that can develop include:    Baby being born too early (prematurity)    Need to deliver by     Baby being smaller than normal    High blood pressure and/or preeclampsia in the mother  Work with your healthcare providers to manage your asthma  You likely have a healthcare provider (HCP) who helps you manage your asthma. During your pregnancy, continue to see this HCP regularly. He or she can continue to monitor your asthma. And medicines can be adjusted as needed. Be sure that this HCP is in contact with the HCP who is caring for your pregnancy. Also be sure both providers know what asthma medicines you take. If you don t have an HCP taking care of your asthma, tell the provider who cares for your pregnancy.  Prevent flare-ups  Here are tips to prevent flare-ups:    Continue using asthma medicines as prescribed.  Follow your HCP s instructions about using asthma medicines. These will likely be inhaled medicines. These have little or no chance of harming you or your baby.      Monitor your lung function. Lung function tests help measure how well your lungs are working. The test results tell you and your providers whether you are getting enough oxygen. You may be tested at your provider s office or at a hospital. You may also be instructed to monitor yourself at home. This is done using a peak flow meter. Your provider will tell you when and how often you need to  use the meter.     Control asthma triggers. These are things that cause your airways to react and lead to an asthma attack (flare-up). Triggers can include smoke, scents, and chemicals. They also include allergies to things like pollen, pets, and dust mites. A flare-up can also be triggered by exercise and changes in the weather. Having a cold or the flu can also trigger a flare-up. To prevent the flu, get a flu shot. If you ve been getting allergy shots, you should continue to do so. However, you should not get allergy shots for the first time when you re pregnant.  Monitor the health of your baby  Your HCP will monitor your baby s health closely during your pregnancy. If your asthma is not well controlled, this becomes even more important. So be sure to keep all your prenatal appointments. Monitoring includes:    Regular ultrasound tests. Ultrasound is a safe test that allows you and your HCP to see an image of your baby in the womb. The ultrasound shows your baby s development, including whether the baby s organs are growing normally.    Fetal nonstress test. This test may be done when you are around your third trimester. It checks if your baby is receiving enough oxygen by monitoring the baby s heart rate. Normally, a baby s heart rate goes up when the baby moves. If the baby s activity is low, it may mean that the baby isn t getting enough oxygen.    Fetal movement counting. Your HCP may tell you to track your baby s movements by doing  fetal kick counts.  This is done by counting the number of movements (kicks) that the baby makes over a certain period. Your provider will let you know how often to count. You ll also be told when you should call him or her. If the baby s movement pattern changes or decreases, more tests will likely be done to check the baby s health.  Know your plan for labor and delivery  Before your due date, talk with your HCP about your labor and delivery plan. You will likely continue  taking your asthma medicines during this time. These prevent a flare-up. They can also help relieve a flare-up if you have one. Your provider will tell you more about this.  When to call your healthcare provider  Call your HCP right away if any of the following happen:    You have wheezing that does not go away after you take medicine.    Your asthma medicines stop working.    You cough up bloody, green, or yellow mucus (signs of a lung infection).    You develop a temperature above 100.4 F (38 C) with shortness of breath or a cough.    Your baby s movement pattern changes or decreases.     8947-0600 [employer]. [address] [place] [address]. All rights reserved. This information is not intended as a substitute for professional medical care. Always follow your healthcare professional's instructions.        Controlling Your Asthma  You can do a lot to manage your asthma and improve your quality of life. You will need to work with your health care provider to develop a plan. But it s up to you to put this plan into action.  Why You Need to Take Control  You need to control the inflammation in your lungs. You also need to relieve symptoms when you have them. These are long-term tasks. But the more you stay in control, the better you ll feel. If you don t stay in control:    Asthma symptoms may cause you to miss school, work, or activities that you enjoy.    Asthma flare-ups can be dangerous, even deadly.    Uncontrolled asthma makes it more likely that you will need emergency department and in-hospital care.    Uncontrolled asthma may cause permanent damage to your lungs.    Peak flow monitoring helps measure how open your airways are.   Taking medication helps you control your asthma and relieve symptoms when they occur.     Using an Asthma Action Plan will help you keep track of and respond to asthma symptoms.   Avoiding triggers--the things that inflame your airways--will help prevent  symptoms and flare-ups.   Your Action Plan  Your health care provider will help you prepare, and when needed, update and Asthma Action Plan. Your plan tells you what to do based on your current symptoms. If you don't have an Asthma Action Plan, or if yours isn't up-to-date, make sure you talk with your health care provider.    1148-9811 [employer]. [address] [place] [address], [place] [address]. All rights reserved. This information is not intended as a substitute for professional medical care. Always follow your healthcare professional's instructions.        Patient Education    Albuterol Pressurized inhalation, suspension    Albuterol Sulfate Inhalation powder    Albuterol Sulfate Nebulizer solution    Albuterol Sulfate Oral syrup    Albuterol Sulfate Oral tablet    Albuterol Sulfate Oral tablet, extended-release  Albuterol Sulfate Nebulizer solution  What is this medicine?  ALBUTEROL (al BYOO ter ole) is a bronchodilator. It helps to open up the airways in your lungs to make it easier to breathe. This medicine is used to treat and to prevent bronchospasm.  This medicine may be used for other purposes; ask your health care provider or pharmacist if you have questions.  What should I tell my health care provider before I take this medicine?  They need to know if you have any of the following conditions:    diabetes    heart disease or irregular heartbeat    high blood pressure    pheochromocytoma    seizures    thyroid disease    an unusual or allergic reaction to albuterol, levalbuterol, sulfites, other medicines, foods, dyes, or preservatives    pregnant or trying to get pregnant    breast-feeding  How should I use this medicine?  This medicine is used in a nebulizer. Nebulizers make a liquid into an aerosol that you breathe in through your mouth or your mouth and nose into your lungs. You will be taught how to use your nebulizer. Follow the directions on your prescription label. Take your medicine at  regular intervals. Do not use more often than directed.  Talk to your pediatrician regarding the use of this medicine in children. Special care may be needed.  Overdosage: If you think you have taken too much of this medicine contact a poison control center or emergency room at once.  NOTE: This medicine is only for you. Do not share this medicine with others.  What if I miss a dose?  If you miss a dose, use it as soon as you can. If it is almost time for your next dose, use only that dose. Do not use double or extra doses.  What may interact with this medicine?    anti-infectives like chloroquine and pentamidine    caffeine    cisapride    diuretics    medicines for colds    medicines for depression or emotional or psychotic conditions    medicines for weight loss including some herbal products    methadone    some antibiotics like clarithromycin, erythromycin, levofloxacin, and linezolid    some heart medicines    steroid hormones like dexamethasone, cortisone, hydrocortisone    theophylline    thyroid hormones  This list may not describe all possible interactions. Give your health care provider a list of all the medicines, herbs, non-prescription drugs, or dietary supplements you use. Also tell them if you smoke, drink alcohol, or use illegal drugs. Some items may interact with your medicine.  What should I watch for while using this medicine?  Tell your doctor or health care professional if your symptoms do not improve. Do not use extra albuterol. Call your doctor right away if your asthma or bronchitis gets worse while you are using this medicine.  If your mouth gets dry try chewing sugarless gum or sucking hard candy. Drink water as directed.  What side effects may I notice from receiving this medicine?  Side effects that you should report to your doctor or health care professional as soon as possible:    allergic reactions like skin rash, itching or hives, swelling of the face, lips, or tongue    breathing  problems    chest pain    feeling faint or lightheaded, falls    high blood pressure    irregular heartbeat    fever    muscle cramps or weakness    pain, tingling, numbness in the hands or feet    vomiting  Side effects that usually do not require medical attention (report to your doctor or health care professional if they continue or are bothersome):    cough    difficulty sleeping    headache    nervousness, trembling    stomach upset    stuffy or runny nose    throat irritation    unusual taste  This list may not describe all possible side effects. Call your doctor for medical advice about side effects. You may report side effects to FDA at 6-292-FDA-6917.  Where should I keep my medicine?  Keep out of the reach of children.  Store between 2 and 25 degrees C (36 and 77 degrees F). Do not freeze. Protect from light. Throw away any unused medicine after the expiration date. Most products are kept in the foil package until time of use. Some products can be used up to 1 week after they are removed from the foil pouch. Check the instructions that come with your medicine.  NOTE: This sheet is a summary. It may not cover all possible information. If you have questions about this medicine, talk to your doctor, pharmacist, or health care provider.  NOTE:This sheet is a summary. It may not cover all possible information. If you have questions about this medicine, talk to your doctor, pharmacist, or health care provider. Copyright  2016 [employer]            Sameer Gary MD  [facility]

## 2017-04-30 NOTE — NURSING NOTE
"Chief Complaint   Patient presents with     Asthma     requesting nebulizer       Initial /55 (BP Location: Left arm, Patient Position: Chair, Cuff Size: Adult Large)  Pulse 72  Temp 97.4  F (36.3  C) (Oral)  Wt 225 lb 9.6 oz (102.3 kg)  LMP  (LMP Unknown)  SpO2 98%  BMI 40.61 kg/m2 Estimated body mass index is 40.61 kg/(m^2) as calculated from the following:    Height as of 4/28/17: 5' 2.5\" (1.588 m).    Weight as of this encounter: 225 lb 9.6 oz (102.3 kg).  Medication Reconciliation: complete     Gina Layne MA    "

## 2017-04-30 NOTE — PATIENT INSTRUCTIONS
Asthma and Pregnancy  Now that you re pregnant, you may be concerned about how asthma will affect your health and the health of your baby. But asthma doesn t have to stop you from having a healthy pregnancy. Managing your asthma can keep you and your baby healthy.     During your pregnancy, work with your healthcare provider to keep your asthma under good control.    Why managing asthma is important during pregnancy  When you re pregnant and have an asthma flare-up, it affects both you and your baby. The baby gets oxygen from your blood to grow and develop normally. Severe asthma can cause problems getting oxygen to your baby. When asthma isn t controlled, problems that can develop include:    Baby being born too early (prematurity)    Need to deliver by     Baby being smaller than normal    High blood pressure and/or preeclampsia in the mother  Work with your healthcare providers to manage your asthma  You likely have a healthcare provider (HCP) who helps you manage your asthma. During your pregnancy, continue to see this HCP regularly. He or she can continue to monitor your asthma. And medicines can be adjusted as needed. Be sure that this HCP is in contact with the HCP who is caring for your pregnancy. Also be sure both providers know what asthma medicines you take. If you don t have an HCP taking care of your asthma, tell the provider who cares for your pregnancy.  Prevent flare-ups  Here are tips to prevent flare-ups:    Continue using asthma medicines as prescribed.  Follow your HCP s instructions about using asthma medicines. These will likely be inhaled medicines. These have little or no chance of harming you or your baby.      Monitor your lung function. Lung function tests help measure how well your lungs are working. The test results tell you and your providers whether you are getting enough oxygen. You may be tested at your provider s office or at a hospital. You may also be instructed to  monitor yourself at home. This is done using a peak flow meter. Your provider will tell you when and how often you need to use the meter.     Control asthma triggers. These are things that cause your airways to react and lead to an asthma attack (flare-up). Triggers can include smoke, scents, and chemicals. They also include allergies to things like pollen, pets, and dust mites. A flare-up can also be triggered by exercise and changes in the weather. Having a cold or the flu can also trigger a flare-up. To prevent the flu, get a flu shot. If you ve been getting allergy shots, you should continue to do so. However, you should not get allergy shots for the first time when you re pregnant.  Monitor the health of your baby  Your HCP will monitor your baby s health closely during your pregnancy. If your asthma is not well controlled, this becomes even more important. So be sure to keep all your prenatal appointments. Monitoring includes:    Regular ultrasound tests. Ultrasound is a safe test that allows you and your HCP to see an image of your baby in the womb. The ultrasound shows your baby s development, including whether the baby s organs are growing normally.    Fetal nonstress test. This test may be done when you are around your third trimester. It checks if your baby is receiving enough oxygen by monitoring the baby s heart rate. Normally, a baby s heart rate goes up when the baby moves. If the baby s activity is low, it may mean that the baby isn t getting enough oxygen.    Fetal movement counting. Your HCP may tell you to track your baby s movements by doing  fetal kick counts.  This is done by counting the number of movements (kicks) that the baby makes over a certain period. Your provider will let you know how often to count. You ll also be told when you should call him or her. If the baby s movement pattern changes or decreases, more tests will likely be done to check the baby s health.  Know your plan for  labor and delivery  Before your due date, talk with your HCP about your labor and delivery plan. You will likely continue taking your asthma medicines during this time. These prevent a flare-up. They can also help relieve a flare-up if you have one. Your provider will tell you more about this.  When to call your healthcare provider  Call your HCP right away if any of the following happen:    You have wheezing that does not go away after you take medicine.    Your asthma medicines stop working.    You cough up bloody, green, or yellow mucus (signs of a lung infection).    You develop a temperature above 100.4 F (38 C) with shortness of breath or a cough.    Your baby s movement pattern changes or decreases.     1470-1213 [employer]. [address] [place] [address], [place] [address]. All rights reserved. This information is not intended as a substitute for professional medical care. Always follow your healthcare professional's instructions.        Controlling Your Asthma  You can do a lot to manage your asthma and improve your quality of life. You will need to work with your health care provider to develop a plan. But it s up to you to put this plan into action.  Why You Need to Take Control  You need to control the inflammation in your lungs. You also need to relieve symptoms when you have them. These are long-term tasks. But the more you stay in control, the better you ll feel. If you don t stay in control:    Asthma symptoms may cause you to miss school, work, or activities that you enjoy.    Asthma flare-ups can be dangerous, even deadly.    Uncontrolled asthma makes it more likely that you will need emergency department and in-hospital care.    Uncontrolled asthma may cause permanent damage to your lungs.    Peak flow monitoring helps measure how open your airways are.   Taking medication helps you control your asthma and relieve symptoms when they occur.     Using an Asthma Action Plan will help you keep  track of and respond to asthma symptoms.   Avoiding triggers--the things that inflame your airways--will help prevent symptoms and flare-ups.   Your Action Plan  Your health care provider will help you prepare, and when needed, update and Asthma Action Plan. Your plan tells you what to do based on your current symptoms. If you don't have an Asthma Action Plan, or if yours isn't up-to-date, make sure you talk with your health care provider.    2161-8627 [employer]. [address] [place] [address]. All rights reserved. This information is not intended as a substitute for professional medical care. Always follow your healthcare professional's instructions.        Patient Education    Albuterol Pressurized inhalation, suspension    Albuterol Sulfate Inhalation powder    Albuterol Sulfate Nebulizer solution    Albuterol Sulfate Oral syrup    Albuterol Sulfate Oral tablet    Albuterol Sulfate Oral tablet, extended-release  Albuterol Sulfate Nebulizer solution  What is this medicine?  ALBUTEROL (al BYOO ter ole) is a bronchodilator. It helps to open up the airways in your lungs to make it easier to breathe. This medicine is used to treat and to prevent bronchospasm.  This medicine may be used for other purposes; ask your health care provider or pharmacist if you have questions.  What should I tell my health care provider before I take this medicine?  They need to know if you have any of the following conditions:    diabetes    heart disease or irregular heartbeat    high blood pressure    pheochromocytoma    seizures    thyroid disease    an unusual or allergic reaction to albuterol, levalbuterol, sulfites, other medicines, foods, dyes, or preservatives    pregnant or trying to get pregnant    breast-feeding  How should I use this medicine?  This medicine is used in a nebulizer. Nebulizers make a liquid into an aerosol that you breathe in through your mouth or your mouth and nose into your lungs.  You will be taught how to use your nebulizer. Follow the directions on your prescription label. Take your medicine at regular intervals. Do not use more often than directed.  Talk to your pediatrician regarding the use of this medicine in children. Special care may be needed.  Overdosage: If you think you have taken too much of this medicine contact a poison control center or emergency room at once.  NOTE: This medicine is only for you. Do not share this medicine with others.  What if I miss a dose?  If you miss a dose, use it as soon as you can. If it is almost time for your next dose, use only that dose. Do not use double or extra doses.  What may interact with this medicine?    anti-infectives like chloroquine and pentamidine    caffeine    cisapride    diuretics    medicines for colds    medicines for depression or emotional or psychotic conditions    medicines for weight loss including some herbal products    methadone    some antibiotics like clarithromycin, erythromycin, levofloxacin, and linezolid    some heart medicines    steroid hormones like dexamethasone, cortisone, hydrocortisone    theophylline    thyroid hormones  This list may not describe all possible interactions. Give your health care provider a list of all the medicines, herbs, non-prescription drugs, or dietary supplements you use. Also tell them if you smoke, drink alcohol, or use illegal drugs. Some items may interact with your medicine.  What should I watch for while using this medicine?  Tell your doctor or health care professional if your symptoms do not improve. Do not use extra albuterol. Call your doctor right away if your asthma or bronchitis gets worse while you are using this medicine.  If your mouth gets dry try chewing sugarless gum or sucking hard candy. Drink water as directed.  What side effects may I notice from receiving this medicine?  Side effects that you should report to your doctor or health care professional as soon as  possible:    allergic reactions like skin rash, itching or hives, swelling of the face, lips, or tongue    breathing problems    chest pain    feeling faint or lightheaded, falls    high blood pressure    irregular heartbeat    fever    muscle cramps or weakness    pain, tingling, numbness in the hands or feet    vomiting  Side effects that usually do not require medical attention (report to your doctor or health care professional if they continue or are bothersome):    cough    difficulty sleeping    headache    nervousness, trembling    stomach upset    stuffy or runny nose    throat irritation    unusual taste  This list may not describe all possible side effects. Call your doctor for medical advice about side effects. You may report side effects to FDA at 0-628-LMY-3066.  Where should I keep my medicine?  Keep out of the reach of children.  Store between 2 and 25 degrees C (36 and 77 degrees F). Do not freeze. Protect from light. Throw away any unused medicine after the expiration date. Most products are kept in the foil package until time of use. Some products can be used up to 1 week after they are removed from the foil pouch. Check the instructions that come with your medicine.  NOTE: This sheet is a summary. It may not cover all possible information. If you have questions about this medicine, talk to your doctor, pharmacist, or health care provider.  NOTE:This sheet is a summary. It may not cover all possible information. If you have questions about this medicine, talk to your doctor, pharmacist, or health care provider. Copyright  2016 [employer]

## 2017-05-19 ENCOUNTER — PRENATAL OFFICE VISIT (OUTPATIENT)
Dept: OBGYN | Facility: CLINIC | Age: 27
End: 2017-05-19
Payer: COMMERCIAL

## 2017-05-19 VITALS
WEIGHT: 232.4 LBS | HEART RATE: 62 BPM | SYSTOLIC BLOOD PRESSURE: 104 MMHG | TEMPERATURE: 97.1 F | BODY MASS INDEX: 41.18 KG/M2 | HEIGHT: 63 IN | DIASTOLIC BLOOD PRESSURE: 63 MMHG

## 2017-05-19 DIAGNOSIS — R73.09 ABNORMAL GLUCOSE TOLERANCE TEST: ICD-10-CM

## 2017-05-19 DIAGNOSIS — Z34.80 ENCOUNTER FOR SUPERVISION OF OTHER NORMAL PREGNANCY: Primary | ICD-10-CM

## 2017-05-19 DIAGNOSIS — Z23 NEED FOR TDAP VACCINATION: ICD-10-CM

## 2017-05-19 PROCEDURE — 99207 ZZC PRENATAL VISIT: CPT | Performed by: NURSE PRACTITIONER

## 2017-05-19 PROCEDURE — 90715 TDAP VACCINE 7 YRS/> IM: CPT | Performed by: NURSE PRACTITIONER

## 2017-05-19 PROCEDURE — 90471 IMMUNIZATION ADMIN: CPT | Performed by: NURSE PRACTITIONER

## 2017-05-19 ASSESSMENT — PAIN SCALES - GENERAL: PAINLEVEL: NO PAIN (0)

## 2017-05-19 NOTE — PROGRESS NOTES
Patient presents for routine prenatal visit. Prenatal flowsheet reviewed and updated as needed.  Denies vaginal bleeding, loss of fluid, contractions or cramping.  Patient without complaint. Tdap given.    Patient missed last appointment with Diabetic Educator, scheduled for next week. We again reviewed importance of keeping appointment and being able to monitor glucose levels.    GBS and ultrasound for fetal position on return to clinic.    Advice as per Anticipatory Guidance/Checklist updated.  PE: See OB vitals    Questions asked and answered. Next OB visit in 2 week(s) with Dr. Ashraf.    Arianna FAROOQ CNP

## 2017-05-19 NOTE — NURSING NOTE
"Chief Complaint   Patient presents with     Prenatal Care     33w6d       Initial /63  Pulse 62  Temp 97.1  F (36.2  C) (Oral)  Ht 5' 2.5\" (1.588 m)  Wt 232 lb 6.4 oz (105.4 kg)  LMP  (LMP Unknown)  BMI 41.83 kg/m2 Estimated body mass index is 41.83 kg/(m^2) as calculated from the following:    Height as of this encounter: 5' 2.5\" (1.588 m).    Weight as of this encounter: 232 lb 6.4 oz (105.4 kg)..  BP completed using cuff size: large    Screening Questionnaire for Adult Immunization    Are you sick today?   No   Do you have allergies to medications, food, a vaccine component or latex?   Yes   Have you ever had a serious reaction after receiving a vaccination?   No   Do you have a long-term health problem with heart disease, lung disease, asthma, kidney disease, metabolic disease (e.g. diabetes), anemia, or other blood disorder?   No   Do you have cancer, leukemia, HIV/AIDS, or any other immune system problem?   No   In the past 3 months, have you taken medications that affect  your immune system, such as prednisone, other steroids, or anticancer drugs; drugs for the treatment of rheumatoid arthritis, Crohn s disease, or psoriasis; or have you had radiation treatments?   No   Have you had a seizure, or a brain or other nervous system problem?   No   During the past year, have you received a transfusion of blood or blood     products, or been given immune (gamma) globulin or antiviral drug?   No   For women: Are you pregnant or is there a chance you could become        pregnant during the next month?   Yes   Have you received any vaccinations in the past 4 weeks?   No     Immunization questionnaire was positive for at least one answer.  Notified Arianna FAROOQ CNP.      MNVFC doesn't apply on this patient    Per orders of Arianna FRAOOQ CNP, injection of Tdap given by Elvira Adhikari. Patient instructed to remain in clinic for 20 minutes afterwards, and to report any adverse reaction to me " immediately.       Screening performed by Elvira Adhikari on 5/19/2017 at 10:08 AM.        Elvira Adhikari CMA

## 2017-05-19 NOTE — MR AVS SNAPSHOT
After Visit Summary   5/19/2017    Gloria Sun    MRN: [id number]           Patient Information     Date Of Birth          1990        Visit Information        Provider Department      5/19/2017 10:10 AM Arianna Pierre APRN CNP [facility]        Today's Diagnoses     Encounter for supervision of other normal pregnancy    -  1    Need for Tdap vaccination        Abnormal glucose tolerance test           Follow-ups after your visit        Your next 10 appointments already scheduled     May 25, 2017  9:30 AM CDT   Diabetic Education with [facility] DIABETIC ED RESOURCE   [facility]    [address] [facility] [address] [phone]              Who to contact     If you have questions or need follow up information about today's clinic visit or your schedule please contact [facility] directly at [phone].  Normal or non-critical lab and imaging results will be communicated to you by MyChart, letter or phone within 4 business days after the clinic has received the results. If you do not hear from us within 7 days, please contact the clinic through MyChart or phone. If you have a critical or abnormal lab result, we will notify you by phone as soon as possible.  Submit refill requests through [employer] or call your pharmacy and they will forward the refill request to us. Please allow 3 business days for your refill to be completed.          Additional Information About Your Visit        MyChart Information     [employer] gives you secure access to your electronic health record. If you see a primary care provider, you can also send messages to your care team and make appointments. If you have questions, please call your primary care clinic.  If you do not have a primary care provider, please call [phone] and they will assist you.        Care EveryWhere ID     This is your Care EveryWhere ID. This could be used by  "other organizations to access your [facility] medical records  [id number]        Your Vitals Were     Pulse Temperature Height Last Period BMI (Body Mass Index)       62 97.1  F (36.2  C) (Oral) 5' 2.5\" (1.588 m) (LMP Unknown) 41.83 kg/m2        Blood Pressure from Last 3 Encounters:   05/19/17 104/63   04/30/17 112/55   04/28/17 103/64    Weight from Last 3 Encounters:   05/19/17 232 lb 6.4 oz (105.4 kg)   04/30/17 225 lb 9.6 oz (102.3 kg)   04/28/17 228 lb 6.4 oz (103.6 kg)              We Performed the Following     TDAP VACCINE (ADACEL)        Primary Care Provider Office Phone # Fax #    Susanne Morales [phone]       [address] [address]        Goals        General    Psychosocial (pt-stated)     Notes - Note edited  9/20/2016  1:05 PM by Shahrzad Hernandez, BSW    As of today's date 9/20/2016 goal is met at 26 - 50%.   Goal Status:  Discontinued   SW unable to contact pt X3  As of today's date 6/23/2016 goal is met at 26 - 50%.   Goal Status:  Active   Pt's mother is continuing to work with [facility] webtide to get her 2 year old son onto Medical Assistance                                                                                  BOLA Gauthier          Thank you!     [facility] you for [facility]  [facility]. Our goal is always to provide you with excellent care. Hearing back from our patients is one way we can continue to improve our services. Please take a few minutes to complete the written survey that you may receive in the mail after your visit with us. Thank you!             Your Updated Medication List - Protect others around you: Learn how to safely use, store and throw away your medicines at www.disposemymeds.org.          This list is accurate as of: 5/19/17 10:21 AM.  Always use your most recent med list.                   Brand Name Dispense Instructions for use    * albuterol 108 (90 BASE) MCG/ACT " Inhaler    PROAIR HFA/PROVENTIL HFA/VENTOLIN HFA    1 Inhaler    Inhale 2 puffs into the lungs every 4 hours as needed for shortness of breath / dyspnea or wheezing       * albuterol (2.5 MG/3ML) 0.083% neb solution     25 vial    Take 1 vial (2.5 mg) by nebulization every 6 hours as needed for shortness of breath / dyspnea or wheezing       calcium carbonate 500 MG chewable tablet    TUMS         fluticasone-salmeterol 100-50 MCG/DOSE diskus inhaler    ADVAIR     Inhale 1 puff into the lungs every 12 hours       meclizine 25 MG tablet    ANTIVERT    30 tablet    Take 1 tablet (25 mg) by mouth every 6 hours as needed for dizziness       PRENATAL VITAMIN PO      Take 1 tablet by mouth daily       * Notice:  This list has 2 medication(s) that are the same as other medications prescribed for you. Read the directions carefully, and ask your doctor or other care provider to review them with you.

## 2017-06-02 ENCOUNTER — PRENATAL OFFICE VISIT (OUTPATIENT)
Dept: OBGYN | Facility: CLINIC | Age: 27
End: 2017-06-02
Payer: COMMERCIAL

## 2017-06-02 VITALS
WEIGHT: 230.2 LBS | BODY MASS INDEX: 41.43 KG/M2 | HEART RATE: 85 BPM | OXYGEN SATURATION: 98 % | SYSTOLIC BLOOD PRESSURE: 108 MMHG | DIASTOLIC BLOOD PRESSURE: 72 MMHG

## 2017-06-02 DIAGNOSIS — O24.419 GESTATIONAL DIABETES MELLITUS (GDM), ANTEPARTUM, GESTATIONAL DIABETES METHOD OF CONTROL UNSPECIFIED: Primary | ICD-10-CM

## 2017-06-02 DIAGNOSIS — Z36.89 DETERMINE FETAL PRESENTATION USING ULTRASOUND: ICD-10-CM

## 2017-06-02 PROCEDURE — 76815 OB US LIMITED FETUS(S): CPT | Performed by: OBSTETRICS & GYNECOLOGY

## 2017-06-02 PROCEDURE — 99207 ZZC COMPLICATED OB VISIT: CPT | Performed by: OBSTETRICS & GYNECOLOGY

## 2017-06-02 PROCEDURE — 59025 FETAL NON-STRESS TEST: CPT | Performed by: OBSTETRICS & GYNECOLOGY

## 2017-06-02 PROCEDURE — 87653 STREP B DNA AMP PROBE: CPT | Performed by: OBSTETRICS & GYNECOLOGY

## 2017-06-02 NOTE — PATIENT INSTRUCTIONS
If you have any questions regarding your visit, Please contact your care team.    Women s Health CLINIC HOURS TELEPHONE NUMBER   Hal[place] DO Mary.    MAGDA Vazquez -    ROCIO Correa RN       Monday, Wednesday, Thursday and Friday, [place]  8:30a.m-5:00 p.m   [facility]  [address] [place] [address]  [phone] ask for [facility]    Imaging [phone]       Urgent Care locations:    [facility] Saturday and Sunday   9 am - 5 pm    Monday-Friday   12 pm - 8 pm  Saturday and Sunday   9 am - 5 pm   [phone]     [facility] Labor and Delivery:  [phone]    If you need a medication refill, please contact your pharmacy. Please allow 3 business days for your refill to be completed.  As always, Thank you for trusting us with your healthcare needs!

## 2017-06-02 NOTE — PROGRESS NOTES
She reports feeling reassuring daily fetal activity and will continue to record.  She denies any fluid leakage or regular uterine contractions.  Her GBS cultures were obtained and submitted today and she denied any PCN allergy.  She lost 2 lbs since her last visit but denies dieting.  She missed 2 appts with the diabetic educator due to court hearings per the pt so she is to meet with them next week since she is not following a special diet or checking her glucose values even though she was advised to.  I performed a limited table-side OB US and confirmed a cephalic presentation.  Today's NST was reactive per protocol.  Will have her return weekly and will consider a growth US since she never did the 3-hour GTT due to her hx of gastric bypass surgery.

## 2017-06-02 NOTE — MR AVS SNAPSHOT
After Visit Summary   6/2/2017    Gloria Sun    MRN: [id number]           Patient Information     Date Of Birth          1990        Visit Information        Provider Department      6/2/2017 1:15 PM Halley Hernandez DO [facility]        Care Instructions                                                         If you have any questions regarding your visit, Please contact your care team.    [facility] CLINIC HOURS TELEPHONE NUMBER   Halley DO Mary.    MAGDA Vazquez -    ROCIO Correa RN       Monday, Wednesday, [facility]  8:30a.m-5:00 p.m   [facility]  [address]  [place], [place] [address] [phone] ask for [facility]    Imaging Soeltfdbfq-[phone]       Urgent Care locations:    [facility] Saturday and Sunday   9 am - 5 pm    Monday-Friday   12 pm - 8 pm  Saturday and Sunday   9 am - 5 pm   [phone]     [facility] Labor and Delivery:  [phone]    If you need a medication refill, please contact your pharmacy. Please allow 3 business days for your refill to be completed.  As always, Thank you for trusting us with your healthcare needs!                Follow-ups after your visit        Your next 10 appointments already scheduled     Jun 09, 2017  3:45 PM CDT   ESTABLISHED PRENATAL with Halleysidney Hernandez DO   [facility] ([facility])    [address]  [facility] [address] [phone]            Juwan 15, 2017 10:45 AM CDT   ESTABLISHED PRENATAL with Ravi Ashraf MD   [facility] ([facility])    [address] [facility] [address] [phone]            Jun 22, 2017 10:45 AM CDT   ESTABLISHED PRENATAL with Halley Hernandez DO   [facility] ([facility])    [address] [facility]  [place] [id number] [phone]            Jun 29, 2017 10:00 AM CDT   ESTABLISHED PRENATAL with Halley Hernandez, DO   [facility] ([facility])    [address] [place] [id number] [phone]              Who to contact     If you have questions or need follow up information about today's clinic visit or your schedule please contact [facility] directly at [phone].  Normal or non-critical lab and imaging results will be communicated to you by Mashup Artshart, letter or phone within 4 business days after the clinic has received the results. If you do not hear from us within 7 days, please contact the clinic through Pokent or phone. If you have a critical or abnormal lab result, we will notify you by phone as soon as possible.  Submit refill requests through Dianwoba or call your pharmacy and they will forward the refill request to us. Please allow 3 business days for your refill to be completed.          Additional Information About Your Visit        Mashup ArtsharJail [employer] Information     Dianwoba gives you secure access to your electronic health record. If you see a primary care provider, you can also send messages to your care team and make appointments. If you have questions, please call your primary care clinic.  If you do not have a primary care provider, please call [phone] and they will assist you.        Care EveryWhere ID     This is your Care EveryWhere ID. This could be used by other organizations to access your [place] medical records  [id number]        Your Vitals Were     Pulse Last Period Pulse Oximetry Breastfeeding? BMI (Body Mass Index)       85 (LMP Unknown) 98% No 41.43 kg/m2        Blood Pressure from Last 3 Encounters:   06/02/17 108/72   05/19/17 104/63   04/30/17 112/55    Weight from Last 3 Encounters:   06/02/17 104.4 kg (230 lb 3.2 oz)   05/19/17 105.4 kg (232 lb 6.4 oz)   04/30/17 102.3 kg (225 lb 9.6 oz)              Today, you  had the following     No orders found for display       Primary Care Provider Office Phone # Fax #    Susanne Morales [phone]       [address]  [facility] [address]        Goals        General    Psychosocial (pt-stated)     Notes - Note edited  9/20/2016  1:05 PM by Shahrzad Hernandez BSW    As of today's date 9/20/2016 goal is met at 26 - 50%.   Goal Status:  Discontinued   SW unable to contact pt X3  As of today's date 6/23/2016 goal is met at 26 - 50%.   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     Thank you for choosing [facility]  for your care. Our goal is always to provide you with excellent care. Hearing back from our patients is one way we can continue to improve our services. Please take a few minutes to complete the written survey that you may receive in the mail after your visit with us. Thank you!             Your Updated Medication List - Protect others around you: Learn how to safely use, store and throw away your medicines at www.disposemymeds.org.          This list is accurate as of: 6/2/17  1:32 PM.  Always use your most recent med list.                   Brand Name Dispense Instructions for use    * albuterol 108 (90 BASE) MCG/ACT Inhaler    PROAIR HFA/PROVENTIL HFA/VENTOLIN HFA    1 Inhaler    Inhale 2 puffs into the lungs every 4 hours as needed for shortness of breath / dyspnea or wheezing       * albuterol (2.5 MG/3ML) 0.083% neb solution     25 vial    Take 1 vial (2.5 mg) by nebulization every 6 hours as needed for shortness of breath / dyspnea or wheezing       calcium carbonate 500 MG chewable tablet    TUMS         fluticasone-salmeterol 100-50 MCG/DOSE diskus inhaler    ADVAIR     Inhale 1 puff into the lungs  every 12 hours       meclizine 25 MG tablet    ANTIVERT    30 tablet    Take 1 tablet (25 mg) by mouth every 6 hours as needed for dizziness       PRENATAL VITAMIN PO      Take 1 tablet by mouth daily       * Notice:  This list has 2 medication(s) that are the same as other medications prescribed for you. Read the directions carefully, and ask your doctor or other care provider to review them with you.

## 2017-06-02 NOTE — NURSING NOTE
"Chief Complaint   Patient presents with     Prenatal Care     35w6d       Initial /72  Pulse 85  Wt 104.4 kg (230 lb 3.2 oz)  LMP  (LMP Unknown)  SpO2 98%  Breastfeeding? No  BMI 41.43 kg/m2 Estimated body mass index is 41.43 kg/(m^2) as calculated from the following:    Height as of 5/19/17: 1.588 m (5' 2.5\").    Weight as of this encounter: 104.4 kg (230 lb 3.2 oz).  Medication Reconciliation:   Taylor Domingo, [facility]  June 2, 2017 1:25 PM        "

## 2017-06-03 LAB
GP B STREP DNA SPEC QL NAA+PROBE: NORMAL
SPECIMEN SOURCE: NORMAL

## 2017-06-06 ENCOUNTER — ALLIED HEALTH/NURSE VISIT (OUTPATIENT)
Dept: EDUCATION SERVICES | Facility: CLINIC | Age: 27
End: 2017-06-06
Payer: COMMERCIAL

## 2017-06-06 VITALS — WEIGHT: 230.6 LBS | HEIGHT: 64 IN | BODY MASS INDEX: 39.37 KG/M2

## 2017-06-06 DIAGNOSIS — Z98.84 STATUS POST GASTRIC BANDING: ICD-10-CM

## 2017-06-06 DIAGNOSIS — Z34.80 ENCOUNTER FOR SUPERVISION OF OTHER NORMAL PREGNANCY: Primary | ICD-10-CM

## 2017-06-06 DIAGNOSIS — R73.09 ABNORMAL GLUCOSE TOLERANCE TEST: ICD-10-CM

## 2017-06-06 PROCEDURE — 99207 ZZC NO CHARGE LOS: CPT

## 2017-06-06 PROCEDURE — G0109 DIAB MANAGE TRN IND/GROUP: HCPCS

## 2017-06-06 NOTE — PATIENT INSTRUCTIONS
1. Check blood sugar 4 times a day, before breakfast and 1 hour after the start of each meal.     2. Check urine ketones when you wake up every morning for 7 days. If negative everyday, reduce testing to once a week.    3. Follow the recommended meal plan: eat something every 2-3 hours, include protein/fat and carbohydrate at every meal and snack, have 30gm carbs at breakfast, 45-60gm carbs at lunch, 45-60gm carbs at supper, 15-30 gm carbs at 3 snacks per day.     4. Add activity to every day, try walking or being active after each meal to help control blood sugar levels.    5.Call or e-mail educator if 3 or more blood sugars are above goal in 1 week. Call or e-mail with questions or concerns.      Call if you have 3 elevated blood sugars in 1 week- then would want to bring back for follow up.  Otherwise, please show blood sugars to your OB for further instructions.    Khadijah Garcia RD, LD, CDE   [phone]    [place] [facility] Services for the [facility]:  For Your Diabetes Education or Nutrition Appointments Call:  [phone]   For Diabetes Education and Nutrition Related Questions:   Phone: [phone]  E-mail: [email or web address]  Fax: [phone]   If you need a medication refill please contact your pharmacy. Please allow 3 business days for your refills to be completed.

## 2017-06-06 NOTE — Clinical Note
Dmitry Ashraf,  Just ROMERO that patient came to class today and stayed for education on blood glucose checking as well as healthy eating for GDM.  Did not want to make follow up appointment at this time but plans to bring her blood glucose to you on Friday.    I did hold time for her next Tuesday at 2:30pm at  if needed.  Please reach out to me if I need to get in touch with patient or if she is having high blood glucose values.  Thanks, Khadijah Garcia RD, LD, CDE

## 2017-06-06 NOTE — PROGRESS NOTES
"Diabetes Self-Management Training - Gestational Diabetes    SUBJECTIVE/OBJECTIVE:  Gloria Sun presents today for education related to gestational diabetes.  She is accompanied by self    Patient's gestational diabetes management related comments/concerns: thought was here to learn meter and would be on her way.    Patient's emotional response to diabetes: expresses readiness to learn and uncertain- failed 1 hour glucose challenge and unable to tolerate the sugar solution for 3 hr GTT so was told to start checking her blood glucose to rule out GDM.    Ht 5' 4\" (1.626 m)  Wt 230 lb 9.6 oz (104.6 kg)  LMP  (LMP Unknown)  BMI 39.58 kg/m2    Pre pregnancy weight: 211#    Weight gain 19.6 lbs at 37 weeks gestation.    Estimated Date of Delivery: 2017    Lab Results   Component Value Date    GLC 71 2017       1 hour OGTT: 149 on 4/3/17  3 hour OGTT: N/A (history of Gastric sleeve so not tolerated)    History   Smoking Status     Never Smoker   Smokeless Tobacco     Never Used       Lifestyle and Health Behaviors:  Physical Activity: Walks 30 minutes, 2 days a week.    Nutrition:  Patient eats 3 meals and 3 snacks per day.    Breakfast: 7am: Toast, yogurt, cereal  Snack: 10am:  Fruit/crackers  Lunch:  12-1pm: salad, pizza, steak, juice  Snack:  pc chocolate  Dinner:  5-6pm: grilled chicken or steak OR hamburger (No bun)  Bedtime Snack:  popcorn    Other time(s) food is eaten? none     Beverages: Juice 12-18ox/day, Milk 0-1x/day, Pop (Regular) 1 can/day and Water all day    Cultural/Caodaism diet restrictions: No     Biggest challenge to healthy eating is:  Cravings - usually something sweet    Pre-Manuelito Vitamin: Yes    Supplements: Yes - Ca  Experiencing nausea?  No     Socio/Economic considerations:  Support System: family (lives with mom)    Health Beliefs and Attitudes:   Stage of Change: PREPARATION (Decided to change - considering how)    ASSESSMENT:  Reviewed target BG values, sharps disposal, " diagnosis criteria for GDM and importance of good blood glucose management for health of mom and baby. Patient advised to call if 3 BG elevated before returns next week. Discussed ketone checking but waiting to have her check unless GDM diagnosis confirmed with blood glucose monitoring.      Discussed carbohydrate sources and impact on BG. Reviewed basics of healthy eating and incorporating a variety of foods into meal plan. Instructed on carbohydrate counting and label reading and recommended patient consume 2 CHO for breakfast, 3-4 CHO for lunch and dinner and 1-2 CHO for each snack, 3 snacks a day. This meal plan will provide 12-16 CHO/day so informed patient to call if struggling since may be able to adjust meal plan if needed.  Used food models to help demonstrate portion control and suggested plate method to balance meals. Discussed importance of not going too low in CHO since that may cause liver to produce excess glucose and contribute to elevated BG readings and ketone formation. To also help prevent against ketone formation, protein was encouraged with meals and snacks, especially with the night snack. Reviewed benefits of exercising to help lower BG and encouraged 30 minutes a day as tolerated and per MD approval, and to target exercise after meals if feel consumed too many CHO or having problem with BG being elevated after a meal. Pt verbalized understanding of concepts discussed and recommendations provided.    Estimated Energy Needs: 2451 kcal/day (16-20 CHO)      INTERVENTION:  Patient was instructed on Contour Next EZ meter and was able to provide an accurate return demonstration. Patient's blood glucose reading today was 75 mg/dL, fasting.    Educational topics covered today:  GDM diagnosis, pathophysiology, Risks and Complications of GDM, Means of controlling GDM, Using a Blood Glucose Monitor, Blood Glucose Goals, Logging and Interpreting Glucose Results, Ketone Testing, When to Call a Diabetes  Educator or OB Provider, Healthy Eating During Pregnancy, Counting Carbohydrates, Meal Planning for GDM, and Physical Activity    Educational materials provided today:   [place] Understanding Gestational Diabetes  GDM Log Book  Sharps Disposal  Care After Delivery  Contour Next EZ meter kit    Pt verbalized understanding of concepts discussed and recommendations provided today.     PLAN:  Check glucose 4 times daily, before breakfast and 1 hour after each meal.     Check Ketones daily for one week, if negative, reduce testing to once a week.     Physical activity recommended: after meals as tolerated and per MD approval if blood glucose high.    Meal plan: 2 carbs at breakfast, 3-4 carbs at lunch, 3-4 carbs at supper, 1-2 carbs at 3 snacks a day.  Follow consistent CHO meal plan, eat CHO and protein/fat at all meals/snacks.    Call/e-mail/MyChart message diabetes educator if 3 or more blood sugars are above the goal in 1 week or if ketones are positive.     Call/e-mail/MyChart message with questions/concerns.    FOLLOW-UP:  Call or e-mail educator if 3 or more blood sugars are above goal in 1 week.  Will need to bring back for follow up.  Have time held on 6/13 incase follow up needed.  Call or e-mail with questions or concerns.    Khadijah Garcia RD, LD, CDE   Time Spent: 95 minutes  Encounter type: Group class

## 2017-06-06 NOTE — MR AVS SNAPSHOT
After Visit Summary   6/6/2017    Gloria Sun    MRN: [id number]           Patient Information     Date Of Birth          1990        Visit Information        Provider Department      6/6/2017 9:00 AM BK GDM [facility] Instructions    1. Check blood sugar 4 times a day, before breakfast and 1 hour after the start of each meal.     2. Check urine ketones when you wake up every morning for 7 days. If negative everyday, reduce testing to once a week.    3. Follow the recommended meal plan: eat something every 2-3 hours, include protein/fat and carbohydrate at every meal and snack, have 30gm carbs at breakfast, 45-60gm carbs at lunch, 45-60gm carbs at supper, 15-30 gm carbs at 3 snacks per day.     4. Add activity to every day, try walking or being active after each meal to help control blood sugar levels.    5.Call or e-mail educator if 3 or more blood sugars are above goal in 1 week. Call or e-mail with questions or concerns.      Call if you have 3 elevated blood sugars in 1 week- then would want to bring back for follow up.  Otherwise, please show blood sugars to your OB for further instructions.    Khadijah Garcia RD, LD, CDE   [phone]    [place] [facility] Education and Nutrition Services for the [facility] Area:  For Your Diabetes Education or Nutrition Appointments Call:  [phone]   For Diabetes Education and Nutrition Related Questions:   Phone: [phone]  E-mail: [email or web address]  Fax: [phone]   If you need a medication refill please contact your pharmacy. Please allow 3 business days for your refills to be completed.             Follow-ups after your visit        Your next 10 appointments already scheduled     Jun 09, 2017  3:45 PM CDT   ESTABLISHED PRENATAL with Halley Hernandez,    [facility] ([facility])    [address] [place] [address] [phone]            Juwan 15,  2017 10:45 AM CDT   ESTABLISHED PRENATAL with Ravi Ashraf MD   [facility]    [address] [facility] [address] [phone]            Jun 22, 2017 10:45 AM CDT   ESTABLISHED PRENATAL with Halley Narvaezddam,    [facility]    [address] [facility] [address] [phone]            Jun 29, 2017 10:00 AM CDT   ESTABLISHED PRENATAL with Halley Hernandez, DO   [facility] ([facility])    [address]  [facility] [address] [phone]              Who to contact     If you have questions or need follow up information about today's clinic visit or your schedule please contact [facility] directly at [phone].  Normal or non-critical lab and imaging results will be communicated to you by Vello Apphart, letter or phone within 4 business days after the clinic has received the results. If you do not hear from us within 7 days, please contact the clinic through Leapfrog Onlinet or phone. If you have a critical or abnormal lab result, we will notify you by phone as soon as possible.  Submit refill requests through KartRocket or call your pharmacy and they will forward the refill request to us. Please allow 3 business days for your refill to be completed.          Additional Information About Your Visit        Vello Apphart Information     KartRocket gives you secure access to your electronic health record. If you see a primary care provider, you can also send messages to your care team and make appointments. If you have questions, please call your primary care clinic.  If you do not have a primary care provider, please call [phone] and they will assist you.        Care EveryWhere ID     This is your Care EveryWhere ID. This could be used by other organizations to access your [place] medical records  [id number]        Your Vitals Were     Height  "Last Period BMI (Body Mass Index)             5' 4\" (1.626 m) (LMP Unknown) 39.58 kg/m2          Blood Pressure from Last 3 Encounters:   06/02/17 108/72   05/19/17 104/63   04/30/17 112/55    Weight from Last 3 Encounters:   06/06/17 230 lb 9.6 oz (104.6 kg)   06/02/17 230 lb 3.2 oz (104.4 kg)   05/19/17 232 lb 6.4 oz (105.4 kg)              Today, you had the following     No orders found for display       Primary Care Provider Office Phone # Fax #    Susanne Morales [phone]       [address] [address]        Goals        General    Psychosocial (pt-stated)     Notes - Note edited  9/20/2016  1:05 PM by Shahrzad Hernandez BSW    As of today's date 9/20/2016 goal is met at 26 - 50%.   Goal Status:  Discontinued   SW unable to contact pt X3  As of today's date 6/23/2016 goal is met at 26 - 50%.   Goal Status:  Active   Pt's mother is continuing to work with [employer] worker to get her 2 year old son onto Medical Assistance                                                                                  BOLA Gauthier          Thank you!     Thank you for choosing [facility]  for your care. Our goal is always to provide you with excellent care. Hearing back from our patients is one way we can continue to improve our services. Please take a few minutes to complete the written survey that you may receive in the mail after your visit with us. Thank you!             Your Updated Medication List - Protect others around you: Learn how to safely use, store and throw away your medicines at www.disposemymeds.org.          This list is accurate as of: 6/6/17 10:34 AM.  Always use your most recent med list.                   Brand Name Dispense Instructions for use    * albuterol 108 (90 BASE) MCG/ACT Inhaler    PROAIR HFA/PROVENTIL HFA/VENTOLIN HFA    1 Inhaler    Inhale 2 puffs into the lungs every 4 hours as needed for shortness of " breath / dyspnea or wheezing       * albuterol (2.5 MG/3ML) 0.083% neb solution     25 vial    Take 1 vial (2.5 mg) by nebulization every 6 hours as needed for shortness of breath / dyspnea or wheezing       calcium carbonate 500 MG chewable tablet    TUMS         fluticasone-salmeterol 100-50 MCG/DOSE diskus inhaler    ADVAIR     Inhale 1 puff into the lungs every 12 hours       meclizine 25 MG tablet    ANTIVERT    30 tablet    Take 1 tablet (25 mg) by mouth every 6 hours as needed for dizziness       PRENATAL VITAMIN PO      Take 1 tablet by mouth daily       * Notice:  This list has 2 medication(s) that are the same as other medications prescribed for you. Read the directions carefully, and ask your doctor or other care provider to review them with you.

## 2017-06-09 ENCOUNTER — RADIANT APPOINTMENT (OUTPATIENT)
Dept: ULTRASOUND IMAGING | Facility: CLINIC | Age: 27
End: 2017-06-09
Attending: OBSTETRICS & GYNECOLOGY
Payer: COMMERCIAL

## 2017-06-09 ENCOUNTER — PRENATAL OFFICE VISIT (OUTPATIENT)
Dept: OBGYN | Facility: CLINIC | Age: 27
End: 2017-06-09
Payer: COMMERCIAL

## 2017-06-09 VITALS
HEART RATE: 67 BPM | TEMPERATURE: 98.4 F | BODY MASS INDEX: 40.37 KG/M2 | WEIGHT: 235.2 LBS | SYSTOLIC BLOOD PRESSURE: 110 MMHG | OXYGEN SATURATION: 98 % | DIASTOLIC BLOOD PRESSURE: 65 MMHG

## 2017-06-09 DIAGNOSIS — O24.410 DIET CONTROLLED GESTATIONAL DIABETES MELLITUS (GDM), ANTEPARTUM: Primary | ICD-10-CM

## 2017-06-09 DIAGNOSIS — O24.410 DIET CONTROLLED GESTATIONAL DIABETES MELLITUS (GDM), ANTEPARTUM: ICD-10-CM

## 2017-06-09 PROCEDURE — 99207 ZZC PRENATAL VISIT: CPT | Performed by: OBSTETRICS & GYNECOLOGY

## 2017-06-09 PROCEDURE — 76816 OB US FOLLOW-UP PER FETUS: CPT | Performed by: RADIOLOGY

## 2017-06-09 NOTE — PROGRESS NOTES
She reports feeling reassuring daily fetal activity and will continue to record.  She denies any fluid leakage or regular uterine contractions.  Her GBS status is negative and she is aware.  The majority of her glucose values this past week are normal and she was advised to continue to call these to her diabetic educator.  Will check a growth US today, if possible.  Her cervix remains unchanged and unfavorable.  She gained 5 lb since her last visit.

## 2017-06-09 NOTE — PATIENT INSTRUCTIONS
If you have any questions regarding your visit, Please contact your care team.    Women s Health CLINIC HOURS TELEPHONE NUMBER   Halley DO Mary.    MAGDA Vazquez -    ROCIO Correa RN       Monday, Wednesday, Thursday and Friday, [place]  8:30a.m-5:00 p.m   [facility]  [address] [place] [address]  [phone] ask for [facility]    Imaging [phone]       Urgent Care locations:    [facility] Saturday and Sunday   9 am - 5 pm    Monday-Friday   12 pm - 8 pm  Saturday and Sunday   9 am - 5 pm   [phone]     [facility] Labor and Delivery:  [phone]    If you need a medication refill, please contact your pharmacy. Please allow 3 business days for your refill to be completed.  As always, Thank you for trusting us with your healthcare needs!

## 2017-06-09 NOTE — MR AVS SNAPSHOT
After Visit Summary   6/9/2017    Gloria Sun    MRN: [id number]           Patient Information     Date Of Birth          1990        Visit Information        Provider Department      6/9/2017 9:45 AM Halley Hernandez DO [facility]        Today's Diagnoses     Diet controlled gestational diabetes mellitus (GDM), antepartum    -  1      Care Instructions                                                         If you have any questions regarding your visit, Please contact your care team.    Women s Health CLINIC HOURS TELEPHONE NUMBER   Halley Hernandez DO.    MAGDA Vazquez -    ROCIO Correa RN       [facility], [facility]  8:30a.m-5:00 p.m   [facility]  [address] [place] [address] [phone] ask for [facility]    Imaging [phone]       Urgent Care locations:    [facility] Saturday and Sunday   9 am - 5 pm    Monday-Friday   12 pm - 8 pm  Saturday and Sunday   9 am - 5 pm   [phone]     [facility] Labor and Delivery:  [phone]    If you need a medication refill, please contact your pharmacy. Please allow 3 business days for your refill to be completed.  As always, Thank you for trusting us with your healthcare needs!                Follow-ups after your visit        Follow-up notes from your care team     Return in about 1 week (around 6/16/2017), or next prenatal visit or earlier prn.      Your next 10 appointments already scheduled     Jun 09, 2017 11:20 AM CDT   US OB SINGLE FOLLOW UP REPEAT with MGUS1, MG  TECH   [facility] ([facility])    [address]  [facility] [address] [phone]           Please bring a list of your medicines (including vitamins, minerals and over-the-counter drugs). Also, tell your doctor about any allergies you may  have. Wear comfortable clothes and leave your valuables at home.  If you re less than 20 weeks drink four 8-ounce glasses of fluid an hour before your exam. If you need to empty your bladder before your exam, try to release only a little urine. Then, drink another glass of fluid.  You may have up to two family members in the exam room. If you bring a small child, an adult must be there to care for him or her.  Please call the Imaging Department at your exam site with any questions.            Juwan 15, 2017 10:45 AM CDT   ESTABLISHED PRENATAL with Ravi Ashraf MD   [facility] ([facility])    [address] [facility] [id number] [phone]            Jun 22, 2017 10:45 AM CDT   ESTABLISHED PRENATAL with Halley Hernandez DO   [facility] ([facility])    [address] [place] [id number] [phone]            Jun 29, 2017 10:00 AM CDT   ESTABLISHED PRENATAL with Halley Hernandez DO   [facility] ([facility])    [address] [place] [id number] [phone]              Future tests that were ordered for you today     Open Future Orders        Priority Expected Expires Ordered    US OB Ltd One Or More Fetus FU/Repeat Routine  6/9/2018 6/9/2017            Who to contact     If you have questions or need follow up information about today's clinic visit or your schedule please contact [facility] directly at [phone].  Normal or non-critical lab and imaging results will be communicated to you by MyChart, letter or phone within 4 business days after the clinic has received the results. If you do not hear from us within 7 days, please contact the clinic through MyChart or phone. If you have a critical or abnormal lab result, we will notify you by phone as soon as possible.  Submit refill requests through [employer] or call your pharmacy and they will  forward the refill request to us. Please allow 3 business days for your refill to be completed.          Additional Information About Your Visit        Zapyahart Information     [employer] gives you secure access to your electronic health record. If you see a primary care provider, you can also send messages to your care team and make appointments. If you have questions, please call your primary care clinic.  If you do not have a primary care provider, please call [phone] and they will assist you.        Care EveryWhere ID     This is your Care EveryWhere ID. This could be used by other organizations to access your [place] medical records  [id number]        Your Vitals Were     Pulse Temperature Last Period Pulse Oximetry BMI (Body Mass Index)       67 98.4  F (36.9  C) (Oral) (LMP Unknown) 98% 40.37 kg/m2        Blood Pressure from Last 3 Encounters:   06/09/17 110/65   06/02/17 108/72   05/19/17 104/63    Weight from Last 3 Encounters:   06/09/17 106.7 kg (235 lb 3.2 oz)   06/06/17 104.6 kg (230 lb 9.6 oz)   06/02/17 104.4 kg (230 lb 3.2 oz)               Primary Care Provider Office Phone # Fax #    Susanne Morales [phone]       [address] [address]        Goals        General    Psychosocial (pt-stated)     Notes - Note edited  9/20/2016  1:05 PM by Shahrzad Hernandez BSW    As of today's date 9/20/2016 goal is met at 26 - 50%.   Goal Status:  Discontinued   SW unable to contact pt X3  As of today's date 6/23/2016 goal is met at 26 - 50%.   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     Thank you for choosing [facility]  for your care. Our goal is always to provide you with excellent care. Hearing back from our patients is  one way we can continue to improve our services. Please take a few minutes to complete the written survey that you may receive in the mail after your visit with us. Thank you!             Your Updated Medication List - Protect others around you: Learn how to safely use, store and throw away your medicines at www.disposemymeds.org.          This list is accurate as of: 6/9/17 10:14 AM.  Always use your most recent med list.                   Brand Name Dispense Instructions for use    * albuterol 108 (90 BASE) MCG/ACT Inhaler    PROAIR HFA/PROVENTIL HFA/VENTOLIN HFA    1 Inhaler    Inhale 2 puffs into the lungs every 4 hours as needed for shortness of breath / dyspnea or wheezing       * albuterol (2.5 MG/3ML) 0.083% neb solution     25 vial    Take 1 vial (2.5 mg) by nebulization every 6 hours as needed for shortness of breath / dyspnea or wheezing       blood glucose monitoring lancets     1 Box    Use to check blood sugars 4 times a day       blood glucose monitoring test strip    FIDEL CONTOUR NEXT    100 strip    Use to check blood glucose 4 times daily (Before breakfast and 1 hour after start of each meal)       calcium carbonate 500 MG chewable tablet    TUMS         fluticasone-salmeterol 100-50 MCG/DOSE diskus inhaler    ADVAIR     Inhale 1 puff into the lungs every 12 hours       meclizine 25 MG tablet    ANTIVERT    30 tablet    Take 1 tablet (25 mg) by mouth every 6 hours as needed for dizziness       PRENATAL VITAMIN PO      Take 1 tablet by mouth daily       * Notice:  This list has 2 medication(s) that are the same as other medications prescribed for you. Read the directions carefully, and ask your doctor or other care provider to review them with you.

## 2017-06-09 NOTE — NURSING NOTE
"Chief Complaint   Patient presents with     Prenatal Care     36w6d       Initial /65  Pulse 67  Temp 98.4  F (36.9  C) (Oral)  Wt 106.7 kg (235 lb 3.2 oz)  LMP  (LMP Unknown)  SpO2 98%  BMI 40.37 kg/m2 Estimated body mass index is 40.37 kg/(m^2) as calculated from the following:    Height as of 6/6/17: 1.626 m (5' 4\").    Weight as of this encounter: 106.7 kg (235 lb 3.2 oz).  Medication Reconciliation: complete   Rhonda Granados CMA      "

## 2017-06-15 ENCOUNTER — PRENATAL OFFICE VISIT (OUTPATIENT)
Dept: OBGYN | Facility: CLINIC | Age: 27
End: 2017-06-15
Payer: COMMERCIAL

## 2017-06-15 VITALS
DIASTOLIC BLOOD PRESSURE: 77 MMHG | TEMPERATURE: 99 F | SYSTOLIC BLOOD PRESSURE: 126 MMHG | WEIGHT: 233 LBS | BODY MASS INDEX: 39.99 KG/M2 | OXYGEN SATURATION: 98 % | HEART RATE: 72 BPM

## 2017-06-15 DIAGNOSIS — Z34.80 ENCOUNTER FOR SUPERVISION OF OTHER NORMAL PREGNANCY: Primary | ICD-10-CM

## 2017-06-15 PROCEDURE — 99207 ZZC PRENATAL VISIT: CPT | Performed by: OBSTETRICS & GYNECOLOGY

## 2017-06-15 NOTE — NURSING NOTE
"Chief Complaint   Patient presents with     Prenatal Care       Initial /77  Pulse 72  Temp 99  F (37.2  C) (Oral)  Wt 233 lb (105.7 kg)  LMP  (LMP Unknown)  SpO2 98%  BMI 39.99 kg/m2 Estimated body mass index is 39.99 kg/(m^2) as calculated from the following:    Height as of 6/6/17: 5' 4\" (1.626 m).    Weight as of this encounter: 233 lb (105.7 kg).  Medication Reconciliation: complete  Shelli Mcmahon M.A.    "

## 2017-06-15 NOTE — MR AVS SNAPSHOT
After Visit Summary   6/15/2017    Gloria Sun    MRN: [id number]           Patient Information     Date Of Birth          1990        Visit Information        Provider Department      6/15/2017 10:45 AM Ravi Ashraf MD [facility]        Today's Diagnoses     Encounter for supervision of other normal pregnancy    -  1       Follow-ups after your visit        Follow-up notes from your care team     Return in about 1 week (around 6/22/2017) for Prenatal Visit.      Your next 10 appointments already scheduled     Jun 22, 2017 10:45 AM CDT   ESTABLISHED PRENATAL with Halley NarvaezddamDO   [facility] ([facility])    [address] [place] [id number] [phone]            Jun 29, 2017 10:00 AM CDT   ESTABLISHED PRENATAL with Halley Bennett MaryDO   [facility] ([facility])    [address] [place] [id number] [phone]              Who to contact     If you have questions or need follow up information about today's clinic visit or your schedule please contact [facility] directly at [phone].  Normal or non-critical lab and imaging results will be communicated to you by MyChart, letter or phone within 4 business days after the clinic has received the results. If you do not hear from us within 7 days, please contact the clinic through MyChart or phone. If you have a critical or abnormal lab result, we will notify you by phone as soon as possible.  Submit refill requests through [employer] or call your pharmacy and they will forward the refill request to us. Please allow 3 business days for your refill to be completed.          Additional Information About Your Visit        Jay[employer]art Information     [employer] gives you secure access to your electronic health record. If you see a primary care provider, you can also send messages to your care team and make  appointments. If you have questions, please call your primary care clinic.  If you do not have a primary care provider, please call [phone] and they will assist you.        Care EveryWhere ID     This is your Care EveryWhere ID. This could be used by other organizations to access your [place] medical records  [id number]        Your Vitals Were     Pulse Temperature Last Period Pulse Oximetry BMI (Body Mass Index)       72 99  F (37.2  C) (Oral) (LMP Unknown) 98% 39.99 kg/m2        Blood Pressure from Last 3 Encounters:   06/15/17 126/77   06/09/17 110/65   06/02/17 108/72    Weight from Last 3 Encounters:   06/15/17 233 lb (105.7 kg)   06/09/17 235 lb 3.2 oz (106.7 kg)   06/06/17 230 lb 9.6 oz (104.6 kg)              Today, you had the following     No orders found for display       Primary Care Provider Office Phone # Fax #    Susanne Morales [phone]       [address] [address]        Goals        General    Psychosocial (pt-stated)     Notes - Note edited  9/20/2016  1:05 PM by Shahrzad Hernandez, BSW    As of today's date 9/20/2016 goal is met at 26 - 50%.   Goal Status:  Discontinued   SW unable to contact pt X3  As of today's date 6/23/2016 goal is met at 26 - 50%.   Goal Status:  Active   Pt's mother is continuing to work with [facility] [employer] to get her 2 year old son onto Medical Assistance                                                                                  John Hernandez LSSARAH          Thank you!     [facility]  [facility]. Our goal is always to provide you with excellent care. Hearing back from our patients is one way we can continue to improve our services. Please take a few minutes to complete the written survey that you may receive in the mail after your visit with us. Thank you!             Your Updated Medication List - Protect others around you: Learn how to safely use, store and  throw away your medicines at www.disposemymeds.org.          This list is accurate as of: 6/15/17 10:54 AM.  Always use your most recent med list.                   Brand Name Dispense Instructions for use    * albuterol 108 (90 BASE) MCG/ACT Inhaler    PROAIR HFA/PROVENTIL HFA/VENTOLIN HFA    1 Inhaler    Inhale 2 puffs into the lungs every 4 hours as needed for shortness of breath / dyspnea or wheezing       * albuterol (2.5 MG/3ML) 0.083% neb solution     25 vial    Take 1 vial (2.5 mg) by nebulization every 6 hours as needed for shortness of breath / dyspnea or wheezing       blood glucose monitoring lancets     1 Box    Use to check blood sugars 4 times a day       blood glucose monitoring test strip    FIDEL CONTOUR NEXT    100 strip    Use to check blood glucose 4 times daily (Before breakfast and 1 hour after start of each meal)       calcium carbonate 500 MG chewable tablet    TUMS         fluticasone-salmeterol 100-50 MCG/DOSE diskus inhaler    ADVAIR     Inhale 1 puff into the lungs every 12 hours       meclizine 25 MG tablet    ANTIVERT    30 tablet    Take 1 tablet (25 mg) by mouth every 6 hours as needed for dizziness       PRENATAL VITAMIN PO      Take 1 tablet by mouth daily       * Notice:  This list has 2 medication(s) that are the same as other medications prescribed for you. Read the directions carefully, and ask your doctor or other care provider to review them with you.

## 2017-06-15 NOTE — PROGRESS NOTES
Patient presents for routine prenatal visit at 37w5d.  Patient without complaint.   PE: See OB vitals  Body mass index is 39.99 kg/(m^2).    Questions asked and answered.    Ravi Ashraf MD FACOG

## 2017-06-22 ENCOUNTER — PRENATAL OFFICE VISIT (OUTPATIENT)
Dept: OBGYN | Facility: CLINIC | Age: 27
End: 2017-06-22
Payer: COMMERCIAL

## 2017-06-22 VITALS
HEART RATE: 63 BPM | SYSTOLIC BLOOD PRESSURE: 108 MMHG | DIASTOLIC BLOOD PRESSURE: 69 MMHG | BODY MASS INDEX: 40.66 KG/M2 | OXYGEN SATURATION: 97 % | WEIGHT: 236.9 LBS

## 2017-06-22 DIAGNOSIS — O36.8130: Primary | ICD-10-CM

## 2017-06-22 PROCEDURE — 59025 FETAL NON-STRESS TEST: CPT | Performed by: OBSTETRICS & GYNECOLOGY

## 2017-06-22 PROCEDURE — 99207 ZZC PRENATAL VISIT: CPT | Performed by: OBSTETRICS & GYNECOLOGY

## 2017-06-22 NOTE — PROGRESS NOTES
She reports feeling reassuring daily fetal activity and will continue to record.  She gained 3 lbs since her last visit and denies any fluid leakage or regular uterine contractions.  However, for the past 24 hours or so she has felt less fetal movement so will check a NST today.  She states that her glucose values have been normal and her cervix remains unchanged and unfavorable.  Will discuss cx ripening and labor induction for 40-41 weeks gestation due to her gestational db hx.  Her NST today was reactive and she was able to feel reassuring fetal movement.  F/u as directed.

## 2017-06-22 NOTE — NURSING NOTE
"Chief Complaint   Patient presents with     Prenatal Care     38w5d       Initial /69  Pulse 63  Wt 107.5 kg (236 lb 14.4 oz)  LMP  (LMP Unknown)  SpO2 97%  Breastfeeding? No  BMI 40.66 kg/m2 Estimated body mass index is 40.66 kg/(m^2) as calculated from the following:    Height as of 6/6/17: 1.626 m (5' 4\").    Weight as of this encounter: 107.5 kg (236 lb 14.4 oz).  Medication Reconciliation:   Taylor Domingo, [facility]  June 22, 2017 10:46 AM        "

## 2017-06-22 NOTE — PATIENT INSTRUCTIONS
If you have any questions regarding your visit, Please contact your care team.    Women s Health CLINIC HOURS TELEPHONE NUMBER   Halley DO Mary.    MAGDA Vazquez -    ROCIO Correa RN       Monday, Wednesday, Thursday and Friday, [place]  8:30a.m-5:00 p.m   [facility]  [address] [place] [address]  [phone] ask for [facility]    Imaging [phone]       Urgent Care locations:    [facility] Saturday and Sunday   9 am - 5 pm    Monday-Friday   12 pm - 8 pm  Saturday and Sunday   9 am - 5 pm   [phone]     [facility] Labor and Delivery:  [phone]    If you need a medication refill, please contact your pharmacy. Please allow 3 business days for your refill to be completed.  As always, Thank you for trusting us with your healthcare needs!

## 2017-06-22 NOTE — MR AVS SNAPSHOT
After Visit Summary   6/22/2017    Gloria Sun    MRN: [id number]           Patient Information     Date Of Birth          1990        Visit Information        Provider Department      6/22/2017 10:45 AM Halley Hernandez DO [facility]        Care Instructions                                                         If you have any questions regarding your visit, Please contact your care team.    Women s Health CLINIC HOURS TELEPHONE NUMBER   Halley Hernandez DO.    MAGDA Vazquez -    ROCIO Correa RN       [facility], [facility]  8:30a.m-5:00 p.m   [facility]  [address]  [place] [address] [phone] ask for [facility]    Imaging [phone]       Urgent Care locations:    [facility] Saturday and Sunday   9 am - 5 pm    Monday-Friday   12 pm - 8 pm  Saturday and Sunday   9 am - 5 pm   [phone]     [facility] Labor and Delivery:  [phone]    If you need a medication refill, please contact your pharmacy. Please allow 3 business days for your refill to be completed.  As always, Thank you for trusting us with your healthcare needs!                Follow-ups after your visit        Your next 10 appointments already scheduled     Jun 29, 2017 10:00 AM CDT   ESTABLISHED PRENATAL with Halley Hernandez DO   [facility] ([facility]    [address] [place] [address] [phone]              Who to contact     If you have questions or need follow up information about today's clinic visit or your schedule please contact [facility] directly at [phone].  Normal or non-critical lab and imaging results will be communicated to you by MyChart, letter or phone within 4 business days after the clinic has received the results. If you do not hear  from us within 7 days, please contact the clinic through [employer] or phone. If you have a critical or abnormal lab result, we will notify you by phone as soon as possible.  Submit refill requests through [employer] or call your pharmacy and they will forward the refill request to us. Please allow 3 business days for your refill to be completed.          Additional Information About Your Visit        [employer] Information     [employer] gives you secure access to your electronic health record. If you see a primary care provider, you can also send messages to your care team and make appointments. If you have questions, please call your primary care clinic.  If you do not have a primary care provider, please call [phone] and they will assist you.        Care EveryWhere ID     This is your Care EveryWhere ID. This could be used by other organizations to access your [place] medical records  [id number]        Your Vitals Were     Pulse Last Period Pulse Oximetry Breastfeeding? BMI (Body Mass Index)       63 (LMP Unknown) 97% No 40.66 kg/m2        Blood Pressure from Last 3 Encounters:   06/22/17 108/69   06/15/17 126/77   06/09/17 110/65    Weight from Last 3 Encounters:   06/22/17 107.5 kg (236 lb 14.4 oz)   06/15/17 105.7 kg (233 lb)   06/09/17 106.7 kg (235 lb 3.2 oz)              Today, you had the following     No orders found for display       Primary Care Provider Office Phone # Fax #    Susanne Morales [phone]       [place] [id number] [facility] [address]        Goals        General    Psychosocial (pt-stated)     Notes - Note edited  9/20/2016  1:05 PM by Shahrzad Hernandez, BSW    As of today's date 9/20/2016 goal is met at 26 - 50%.   Goal Status:  Discontinued   SW unable to contact pt X3  As of today's date 6/23/2016 goal is met at 26 - 50%.   Goal Status:  Active   Pt's mother is continuing to work with county financial worker to get her 2 year old son onto Medical  Assistance                                                                                  John Hernandez SARAH          Equal Access to Services     LALI NAVARRO : Hadii jackie mason ariadnafredis Somannieali, waaxda luqadaha, qaybta kaalmarickey quiñonez. So [facility] [phone].    ATENCIÓN: Si habla español, tiene a deal disposición servicios gratuitos de asistencia lingüística. Llame al [phone].    We comply with applicable federal civil rights laws and [place] laws. We do not discriminate on the basis of race, color, national origin, age, disability sex, sexual orientation or gender identity.            Thank you!     Thank you for choosing [facility]  for your care. Our goal is always to provide you with excellent care. Hearing back from our patients is one way we can continue to improve our services. Please take a few minutes to complete the written survey that you may receive in the mail after your visit with us. Thank you!             Your Updated Medication List - Protect others around you: Learn how to safely use, store and throw away your medicines at www.disposemymeds.org.          This list is accurate as of: 6/22/17 10:46 AM.  Always use your most recent med list.                   Brand Name Dispense Instructions for use Diagnosis    * albuterol 108 (90 BASE) MCG/ACT Inhaler    PROAIR HFA/PROVENTIL HFA/VENTOLIN HFA    1 Inhaler    Inhale 2 puffs into the lungs every 4 hours as needed for shortness of breath / dyspnea or wheezing    Intermittent asthma, uncomplicated       * albuterol (2.5 MG/3ML) 0.083% neb solution     25 vial    Take 1 vial (2.5 mg) by nebulization every 6 hours as needed for shortness of breath / dyspnea or wheezing    Mild persistent asthma with acute exacerbation       blood glucose monitoring lancets     1 Box    Use to check blood sugars 4 times a day    Encounter for supervision of other normal pregnancy, Abnormal glucose tolerance  test, Status post gastric banding       blood glucose monitoring test strip    FIDEL CONTOUR NEXT    100 strip    Use to check blood glucose 4 times daily (Before breakfast and 1 hour after start of each meal)    Encounter for supervision of other normal pregnancy, Abnormal glucose tolerance test, Status post gastric banding       calcium carbonate 500 MG chewable tablet    TUMS          fluticasone-salmeterol 100-50 MCG/DOSE diskus inhaler    ADVAIR     Inhale 1 puff into the lungs every 12 hours        meclizine 25 MG tablet    ANTIVERT    30 tablet    Take 1 tablet (25 mg) by mouth every 6 hours as needed for dizziness    Dizziness       PRENATAL VITAMIN PO      Take 1 tablet by mouth daily        * Notice:  This list has 2 medication(s) that are the same as other medications prescribed for you. Read the directions carefully, and ask your doctor or other care provider to review them with you.

## 2017-06-23 ENCOUNTER — TELEPHONE (OUTPATIENT)
Dept: OBGYN | Facility: CLINIC | Age: 27
End: 2017-06-23

## 2017-06-23 NOTE — TELEPHONE ENCOUNTER
Phone call to patient. Spoke with a . She stated patient is not available as her mother took her to [facility]. She stated is not sure if she was going to [facility] or not.   Called Dr. Hernandez and updated her that patient was probably headed to [facility]. Mavis Melo RN, BAN

## 2017-06-23 NOTE — TELEPHONE ENCOUNTER
"Woke up this morning and complains of severe pain in right side of her back \"near my spine.\" Patient stated she is feels she cannot get out of bed. Patient also reported \"lower\" pelvic pain \"like a period\" but no cramping. She stated got up to void at 0300 and had pain then. Patient denied any urinary sx's, fld leak, vaginal bleeding, contractions, or other sx's. Patient stated baby is active. Patient stated she has tried position changes and ice and does not seem to totally relieve pain. She  Said pain is \"a little better\" if she lays on her left side and does not attempt to ambulate. Patient reports pain 9/10 if she attempts to ambulate. She feels like her right leg may not be able to support her wt and \"will give out.\" Patient stated this is a new sx and did not discuss at office visit yesterday. Patient has not tried Tylenol but has med at home. Patient stated she is with her mom. Stated she needs to be seen if her pain is that severe but if she cannot ambulate due to pain then she needs to call 911 for ambulance. Recommended she have her mom Tylenol 500 mg 2 tabs with some food. Can use heating pad to back and rest in bed on left side for 30-45 mins if pain has improved. Recommended she slowly attempt to ambulate but slowly sitting up from left side lying. Do not attempt to stand if she feels leg will not hold as do not want patient to fall. Recommended she call back to update sx's. Patient agreed to follow plan. Mavis Melo RN, BAN      "

## 2017-06-28 ENCOUNTER — PRENATAL OFFICE VISIT (OUTPATIENT)
Dept: OBGYN | Facility: CLINIC | Age: 27
End: 2017-06-28
Payer: COMMERCIAL

## 2017-06-28 VITALS
WEIGHT: 238.2 LBS | SYSTOLIC BLOOD PRESSURE: 107 MMHG | OXYGEN SATURATION: 98 % | BODY MASS INDEX: 40.89 KG/M2 | DIASTOLIC BLOOD PRESSURE: 72 MMHG | HEART RATE: 71 BPM

## 2017-06-28 DIAGNOSIS — O24.410 DIET CONTROLLED GESTATIONAL DIABETES MELLITUS (GDM), ANTEPARTUM: Primary | ICD-10-CM

## 2017-06-28 PROCEDURE — 99207 ZZC PRENATAL VISIT: CPT | Performed by: OBSTETRICS & GYNECOLOGY

## 2017-06-28 NOTE — MR AVS SNAPSHOT
After Visit Summary   6/28/2017    Gloria Sun    MRN: [id number]           Patient Information     Date Of Birth          1990        Visit Information        Provider Department      6/28/2017 9:15 AM Halley Hernandez DO [facility]        Care Instructions                                                         If you have any questions regarding your visit, Please contact your care team.    Women s Health CLINIC HOURS TELEPHONE NUMBER   Halley Hernandez DO.    MAGDA Vazquez -    ROCIO Correa RN       [facility], Wednesday, [facility]  8:30a.m-5:00 p.m   [facility]  [address]  [place] [address] [phone] ask for [facility]    Imaging Pmevfkflrv-[phone]       Urgent Care locations:    [facility] Saturday and Sunday   9 am - 5 pm    Monday-Friday   12 pm - 8 pm  Saturday and Sunday   9 am - 5 pm   [phone]     [facility] Labor and Delivery:  [phone]    If you need a medication refill, please contact your pharmacy. Please allow 3 business days for your refill to be completed.  As always, Thank you for trusting us with your healthcare needs!                Follow-ups after your visit        Your next 10 appointments already scheduled     Jun 28, 2017  9:15 AM CDT   ESTABLISHED PRENATAL with Halley Hernandez DO   [facility] ([facility])    [address] [facility] [address] [phone]              Who to contact     If you have questions or need follow up information about today's clinic visit or your schedule please contact [facility] directly at [phone].  Normal or non-critical lab and imaging results will be communicated to you by MyChart, letter or phone within 4 business days after the clinic has received the results. If you do not hear  from us within 7 days, please contact the clinic through [employer] or phone. If you have a critical or abnormal lab result, we will notify you by phone as soon as possible.  Submit refill requests through [employer] or call your pharmacy and they will forward the refill request to us. Please allow 3 business days for your refill to be completed.          Additional Information About Your Visit        [employer] Information     [employer] gives you secure access to your electronic health record. If you see a primary care provider, you can also send messages to your care team and make appointments. If you have questions, please call your primary care clinic.  If you do not have a primary care provider, please call [phone] and they will assist you.        Care EveryWhere ID     This is your Care EveryWhere ID. This could be used by other organizations to access your [facility] medical records  [id number]        Your Vitals Were     Pulse Last Period Pulse Oximetry Breastfeeding? BMI (Body Mass Index)       71 (LMP Unknown) 98% No 40.89 kg/m2        Blood Pressure from Last 3 Encounters:   06/28/17 107/72   06/22/17 108/69   06/15/17 126/77    Weight from Last 3 Encounters:   06/28/17 108 kg (238 lb 3.2 oz)   06/22/17 107.5 kg (236 lb 14.4 oz)   06/15/17 105.7 kg (233 lb)              Today, you had the following     No orders found for display       Primary Care Provider Office Phone # Fax #    Susanne Morales [phone]       [place] [id number] [facility] [address]        Goals        General    Psychosocial (pt-stated)     Notes - Note edited  9/20/2016  1:05 PM by Shahrzad Hernandez, BSW    As of today's date 9/20/2016 goal is met at 26 - 50%.   Goal Status:  Discontinued   SW unable to contact pt X3  As of today's date 6/23/2016 goal is met at 26 - 50%.   Goal Status:  Active   Pt's mother is continuing to work with county financial worker to get her 2 year old son onto Medical  Assistance                                                                                  John Hernandez SARAH          Equal Access to Services     LALI NAVARRO : Hadii jackie mason ariadnafredis Somannieali, waaxda luqadaha, qaybta kaalmarickey quiñonez. So [facility] [phone].    ATENCIÓN: Si habla español, tiene a deal disposición servicios gratuitos de asistencia lingüística. Llame al [phone].    We comply with applicable federal civil rights laws and [place] laws. We do not discriminate on the basis of race, color, national origin, age, disability sex, sexual orientation or gender identity.            Thank you!     Thank you for choosing [facility]  for your care. Our goal is always to provide you with excellent care. Hearing back from our patients is one way we can continue to improve our services. Please take a few minutes to complete the written survey that you may receive in the mail after your visit with us. Thank you!             Your Updated Medication List - Protect others around you: Learn how to safely use, store and throw away your medicines at www.disposemymeds.org.          This list is accurate as of: 6/28/17  8:46 AM.  Always use your most recent med list.                   Brand Name Dispense Instructions for use Diagnosis    * albuterol 108 (90 BASE) MCG/ACT Inhaler    PROAIR HFA/PROVENTIL HFA/VENTOLIN HFA    1 Inhaler    Inhale 2 puffs into the lungs every 4 hours as needed for shortness of breath / dyspnea or wheezing    Intermittent asthma, uncomplicated       * albuterol (2.5 MG/3ML) 0.083% neb solution     25 vial    Take 1 vial (2.5 mg) by nebulization every 6 hours as needed for shortness of breath / dyspnea or wheezing    Mild persistent asthma with acute exacerbation       blood glucose monitoring lancets     1 Box    Use to check blood sugars 4 times a day    Encounter for supervision of other normal pregnancy, Abnormal glucose tolerance  test, Status post gastric banding       blood glucose monitoring test strip    FIDEL CONTOUR NEXT    100 strip    Use to check blood glucose 4 times daily (Before breakfast and 1 hour after start of each meal)    Encounter for supervision of other normal pregnancy, Abnormal glucose tolerance test, Status post gastric banding       calcium carbonate 500 MG chewable tablet    TUMS          fluticasone-salmeterol 100-50 MCG/DOSE diskus inhaler    ADVAIR     Inhale 1 puff into the lungs every 12 hours        meclizine 25 MG tablet    ANTIVERT    30 tablet    Take 1 tablet (25 mg) by mouth every 6 hours as needed for dizziness    Dizziness       PRENATAL VITAMIN PO      Take 1 tablet by mouth daily        TYLENOL PO      Take by mouth as needed for mild pain or fever        * Notice:  This list has 2 medication(s) that are the same as other medications prescribed for you. Read the directions carefully, and ask your doctor or other care provider to review them with you.

## 2017-06-28 NOTE — PROGRESS NOTES
She reports feeling reassuring daily fetal activity and will continue to record.  She gained 2 lbs since her last visit and denies any fluid leakage or regular uterine contractions.  Due to gestational diabetes (diet-controlled), we discussed cervical ripening since her Gonzalez score is 3 today.  Due to her severe right-sided sciatic nerve pain, she would like to start the induction process at 40 weeks versus 41 weeks and informed consent was reviewed and obtained.  EFW is 8 lbs and her GBS status is negative.  I called and set up her cervical ripening admission for 6/30 at 6 pm with instructions provided.

## 2017-06-28 NOTE — NURSING NOTE
"Chief Complaint   Patient presents with     Prenatal Care     [id number] pelvic pain       Initial /72  Pulse 71  Wt 108 kg (238 lb 3.2 oz)  LMP  (LMP Unknown)  SpO2 98%  Breastfeeding? No  BMI 40.89 kg/m2 Estimated body mass index is 40.89 kg/(m^2) as calculated from the following:    Height as of 6/6/17: 1.626 m (5' 4\").    Weight as of this encounter: 108 kg (238 lb 3.2 oz).  Medication Reconciliation:   Taylor Domingo, [facility]  June 28, 2017 8:45 AM        "

## 2017-06-28 NOTE — PATIENT INSTRUCTIONS
If you have any questions regarding your visit, Please contact your care team.    Women s Health CLINIC HOURS TELEPHONE NUMBER   Halley DO Mary.    MAGDA Vazquez -    ROCIO Correa RN       Monday, Wednesday, Thursday and Friday, [place]  8:30a.m-5:00 p.m   [facility]  [address] [place] [address]  [phone] ask for [facility]    Imaging Viphnaeaet-[phone]       Urgent Care locations:    [facility] Saturday and Sunday   9 am - 5 pm    Monday-Friday   12 pm - 8 pm  Saturday and Sunday   9 am - 5 pm   [phone]     [facility] Labor and Delivery:  [phone]    If you need a medication refill, please contact your pharmacy. Please allow 3 business days for your refill to be completed.  As always, Thank you for trusting us with your healthcare needs!

## 2017-07-24 NOTE — PROGRESS NOTES
SUBJECTIVE:                                                    Gloria Sun is a 26 year old female who presents to clinic today for the following health issues:      Bilateral Arm pit itching      Duration: 4 months     Intensity:  severe    Accompanying signs and symptoms:drainage, Itching and burning    History (similar episodes/previous evaluation): yes on going issue for the past 6 years    Precipitating or alleviating factors: None    Therapies tried and outcome: Fluconazole has been the most recent medication has worked in the past but did not work this time.             Problem list and histories reviewed & adjusted, as indicated.  Additional history: as documented    Patient Active Problem List   Diagnosis     CARDIOVASCULAR SCREENING; LDL GOAL LESS THAN 160     Chronic jaw pain     Status post gastric banding     Gilbert's syndrome     Generalized hyperhidrosis     Obesity     Intermittent asthma, uncomplicated     Health Care Home     Post concussion syndrome     Need for Tdap vaccination     Encounter for supervision of other normal pregnancy     Rh negative state in antepartum period     ASCUS with positive high risk HPV cervical     Abnormal glucose tolerance test-No show and Cancel Diab Ed x 2     Past Surgical History:   Procedure Laterality Date     ESOPHAGOSCOPY, GASTROSCOPY, DUODENOSCOPY (EGD), COMBINED Left 12/31/2014    Procedure: COMBINED ESOPHAGOSCOPY, GASTROSCOPY, DUODENOSCOPY (EGD), BIOPSY SINGLE OR MULTIPLE;  Surgeon: Ilan Marrero MD;  [facility]: [facility]     LAPAROSCOPIC CHOLECYSTECTOMY  2/21/2014    Procedure: LAPAROSCOPIC CHOLECYSTECTOMY;  Laparoscopic Cholecystectomy;  Surgeon: Ilan Marrero MD;  [facility]     LAPAROSCOPIC GASTRIC SLEEVE  1/21/2013    Procedure: LAPAROSCOPIC GASTRIC SLEEVE;  Laparoscopic Sleeve Gastrectomy;  Surgeon: Chato Mathews MD;  Location: [facility]     MOUTH SURGERY  2009       Social History   Substance Use Topics     Smoking status:  Never Smoker     Smokeless tobacco: Never Used     Alcohol use No     Family History   Problem Relation Age of Onset     Lipids Mother      Anxiety Disorder Mother      Depression Mother      Hypertension Mother      Obesity Mother      CANCER Father      skin     DIABETES Father      Hypertension Father      Obesity Father      Hypertension Maternal Grandmother      C.A.D. Maternal Grandmother      CABG     Lipids Maternal Grandmother      Depression Maternal Grandmother      Obesity Maternal Grandmother      C.A.D. Maternal Grandfather      CABG, MI      Asthma Maternal Grandfather      CANCER Maternal Grandfather      Respiratory Maternal Grandfather      Lipids Maternal Grandfather      Hypertension Maternal Grandfather      Alzheimer Disease Maternal Grandfather      Depression Maternal Grandfather      Obesity Maternal Grandfather      CEREBROVASCULAR DISEASE Paternal Grandmother      Arthritis Paternal Grandmother      d. lupus     Obesity Paternal Grandmother      HEART DISEASE Paternal Grandfather      Lipids Paternal Grandfather      Thyroid Disease No family hx of      Glaucoma No family hx of      Macular Degeneration No family hx of          BP Readings from Last 3 Encounters:   07/25/17 108/68   06/28/17 107/72   06/22/17 108/69    Wt Readings from Last 3 Encounters:   07/25/17 217 lb 8 oz (98.7 kg)   06/28/17 238 lb 3.2 oz (108 kg)   06/22/17 236 lb 14.4 oz (107.5 kg)                  Labs reviewed in EPIC        Reviewed and updated as needed this visit by clinical staffTobacco  Allergies  Meds       Reviewed and updated as needed this visit by Provider         ROS:  This 26 year old female is here today with her 24 day old son. She has suffered from terrible infections in her axillary areas for many years. She has been treated with septra multiple times with no improvement. She has been seen by dermatologists. She believes it is due to excessive sweating and wants a referral to dermatology for  botox injections to reduce sweating. She has a 3 year old at home also and is not nursing. She has tried anti fungal creams and they aren't helpful either. She plans to be on depo provera again soon. She was borderline diabetic during her pregnancy but did not need insulin. All other review of systems are negative  Personal, family, and social history reviewed with patient and revised.         OBJECTIVE:     /68  Pulse 58  Temp 98.3  F (36.8  C) (Oral)  Wt 217 lb 8 oz (98.7 kg)  LMP  (LMP Unknown)  SpO2 98%  BMI 37.33 kg/m2  Body mass index is 37.33 kg/(m^2).   patient is morbidly obese  She has terrible candida in her axillary areas. No where else on her body, though.   She doesn't have pustules or papules and she just finished another round of septra at time of discharge from the [facility].   She doesn't appear to have excessive sweating here today.     Diagnostic Test Results:  none     ASSESSMENT/PLAN:              1. Intertriginous candidiasis  As above   She should use over the counter monistat or lotrimin  Encouraged gold Bond powder to dry the area.   Encouraged to sponge bath with water and bleach to kill the fungal infection   - fluconazole (DIFLUCAN) 150 MG tablet; Take 1 tablet (150 mg) by mouth every 3 days  Dispense: 4 tablet; Refill: 0    2. Morbid obesity due to excess calories (H)  Discussed need for low carbohydrates diet at length. Gave her detailed food lists. She must try hard to reduce intake of sugars and carbohydrates     3. Generalized hyperhidrosis  Discussed need to get rash cleared first. I would consider a referral to thoracic surgeon on the future for vagotomy procedure to help with sweating if need be.       Return to clinic if no improvement     JOSE ALFREDO COULTER MD  [facility]

## 2017-07-25 ENCOUNTER — TELEPHONE (OUTPATIENT)
Dept: FAMILY MEDICINE | Facility: CLINIC | Age: 27
End: 2017-07-25

## 2017-07-25 ENCOUNTER — OFFICE VISIT (OUTPATIENT)
Dept: FAMILY MEDICINE | Facility: CLINIC | Age: 27
End: 2017-07-25
Payer: COMMERCIAL

## 2017-07-25 VITALS
OXYGEN SATURATION: 98 % | DIASTOLIC BLOOD PRESSURE: 68 MMHG | SYSTOLIC BLOOD PRESSURE: 108 MMHG | HEART RATE: 58 BPM | TEMPERATURE: 98.3 F | BODY MASS INDEX: 37.33 KG/M2 | WEIGHT: 217.5 LBS

## 2017-07-25 DIAGNOSIS — R61 GENERALIZED HYPERHIDROSIS: ICD-10-CM

## 2017-07-25 DIAGNOSIS — E66.01 MORBID OBESITY DUE TO EXCESS CALORIES (H): ICD-10-CM

## 2017-07-25 DIAGNOSIS — B37.2 INTERTRIGINOUS CANDIDIASIS: Primary | ICD-10-CM

## 2017-07-25 PROCEDURE — 99214 OFFICE O/P EST MOD 30 MIN: CPT | Performed by: FAMILY MEDICINE

## 2017-07-25 RX ORDER — FLUCONAZOLE 150 MG/1
150 TABLET ORAL
Qty: 4 TABLET | Refills: 0 | Status: SHIPPED | OUTPATIENT
Start: 2017-07-25 | End: 2017-10-17

## 2017-07-25 ASSESSMENT — ANXIETY QUESTIONNAIRES
3. WORRYING TOO MUCH ABOUT DIFFERENT THINGS: NOT AT ALL
IF YOU CHECKED OFF ANY PROBLEMS ON THIS QUESTIONNAIRE, HOW DIFFICULT HAVE THESE PROBLEMS MADE IT FOR YOU TO DO YOUR WORK, TAKE CARE OF THINGS AT HOME, OR GET ALONG WITH OTHER PEOPLE: NOT DIFFICULT AT ALL
2. NOT BEING ABLE TO STOP OR CONTROL WORRYING: NOT AT ALL
6. BECOMING EASILY ANNOYED OR IRRITABLE: SEVERAL DAYS
7. FEELING AFRAID AS IF SOMETHING AWFUL MIGHT HAPPEN: NOT AT ALL
5. BEING SO RESTLESS THAT IT IS HARD TO SIT STILL: NOT AT ALL
GAD7 TOTAL SCORE: 1
1. FEELING NERVOUS, ANXIOUS, OR ON EDGE: NOT AT ALL

## 2017-07-25 ASSESSMENT — PAIN SCALES - GENERAL: PAINLEVEL: NO PAIN (0)

## 2017-07-25 ASSESSMENT — PATIENT HEALTH QUESTIONNAIRE - PHQ9: 5. POOR APPETITE OR OVEREATING: NOT AT ALL

## 2017-07-25 NOTE — PATIENT INSTRUCTIONS
[facility]    If you have any questions regarding to your visit please contact your care team:       Team Purple:   Clinic Hours Telephone Number   Dr. Shahrzad Garnica     7am-7pm  Monday - Thursday   7am-5pm  Fridays  [phone]  (Appointment scheduling available 24/7)    Questions about your Visit?   Team Line:  [phone]   Urgent Care - [facility] - 11am-9pm Monday-Friday Saturday-Sunday- 9am-5pm   [place] - 5pm-9pm Monday-Friday Saturday-Sunday- 9am-5pm  [phone] - [facility]  [phone] - [place]       What options do I have for visits at the clinic other than the traditional office visit?  To expand how we care for you, many of our providers are utilizing electronic visits (e-visits) and telephone visits, when medically appropriate, for interactions with their patients rather than a visit in the clinic.   We also offer nurse visits for many medical concerns. Just like any other service, we will bill your insurance company for this type of visit based on time spent on the phone with your provider. Not all insurance companies cover these visits. Please check with your medical insurance if this type of visit is covered. You will be responsible for any charges that are not paid by your insurance.      E-visits via [employer]:  generally incur a $35.00 fee.  Telephone visits:  Time spent on the phone: *charged based on time that is spent on the phone in increments of 10 minutes. Estimated cost:   5-10 mins $30.00   11-20 mins. $59.00   21-30 mins. $85.00     Use [employer] (secure email communication and access to your chart) to send your primary care provider a message or make an appointment. Ask someone on your Team how to sign up for [employer].  For a Price Quote for your services, please call our Consumer Price Line at [phone].  As always, Thank you for trusting us with your health care needs!

## 2017-07-25 NOTE — NURSING NOTE
"Chief Complaint   Patient presents with     Infection     Health Maintenance     ACT, PHQ-9, PORTER       Initial /68  Pulse 58  Temp 98.3  F (36.8  C) (Oral)  Wt 217 lb 8 oz (98.7 kg)  LMP  (LMP Unknown)  SpO2 98%  BMI 37.33 kg/m2 Estimated body mass index is 37.33 kg/(m^2) as calculated from the following:    Height as of 6/6/17: 5' 4\" (1.626 m).    Weight as of this encounter: 217 lb 8 oz (98.7 kg).  Medication Reconciliation:       "

## 2017-07-25 NOTE — MR AVS SNAPSHOT
After Visit Summary   7/25/2017    Gloria Sun    MRN: [id number]           Patient Information     Date Of Birth          1990        Visit Information        Provider Department      7/25/2017 11:00 AM Shahrzad Henriquez MD [facility]        Today's Diagnoses     Intertriginous candidiasis    -  1      Care Instructions    [facility]    If you have any questions regarding to your visit please contact your care team:       Team Purple:   Clinic Hours Telephone Number   Dr. Shahrzad Garnica     7am-7pm  Monday - Thursday   7am-5pm  Fridays  [phone]  (Appointment scheduling available 24/7)    Questions about your Visit?   Team Line:  [phone]   Urgent Care - [place] and [facility] - 11am-9pm Monday-Friday Saturday-Sunday- 9am-5pm   [place] - 5pm-9pm Monday-Friday Saturday-Sunday- 9am-5pm  [phone] - [facility]  [phone] - [place]       What options do I have for visits at the clinic other than the traditional office visit?  To expand how we care for you, many of our providers are utilizing electronic visits (e-visits) and telephone visits, when medically appropriate, for interactions with their patients rather than a visit in the clinic.   We also offer nurse visits for many medical concerns. Just like any other service, we will bill your insurance company for this type of visit based on time spent on the phone with your provider. Not all insurance companies cover these visits. Please check with your medical insurance if this type of visit is covered. You will be responsible for any charges that are not paid by your insurance.      E-visits via Retrophin:  generally incur a $35.00 fee.  Telephone visits:  Time spent on the phone: *charged based on time that is spent on the phone in increments of 10 minutes. Estimated cost:   5-10 mins $30.00   11-20 mins. $59.00   21-30 mins. $85.00     Use  MyChart (secure email communication and access to your chart) to send your primary care provider a message or make an appointment. Ask someone on your Team how to sign up for nChannelhart.  For a Price Quote for your services, please call our Consumer Price Line at [phone].  As always, Thank you for trusting us with your health care needs!              Follow-ups after your visit        Your next 10 appointments already scheduled     Aug 10, 2017 11:00 AM CDT   Post Partum with Halley Hernandez,    [facility] ([facility])    [address] [place] [id number] [phone]              Who to contact     If you have questions or need follow up information about today's clinic visit or your schedule please contact [facility] directly at [phone].  Normal or non-critical lab and imaging results will be communicated to you by MyChart, letter or phone within 4 business days after the clinic has received the results. If you do not hear from us within 7 days, please contact the clinic through nChannelhart or phone. If you have a critical or abnormal lab result, we will notify you by phone as soon as possible.  Submit refill requests through Gurubooks or call your pharmacy and they will forward the refill request to us. Please allow 3 business days for your refill to be completed.          Additional Information About Your Visit        MyChart Information     Tresoritt gives you secure access to your electronic health record. If you see a primary care provider, you can also send messages to your care team and make appointments. If you have questions, please call your primary care clinic.  If you do not have a primary care provider, please call [phone] and they will assist you.        Care EveryWhere ID     This is your Care EveryWhere ID. This could be used by other organizations to access your [facility] medical records  [id number]        Your Vitals  Were     Pulse Temperature Last Period Pulse Oximetry BMI (Body Mass Index)       58 98.3  F (36.8  C) (Oral) (LMP Unknown) 98% 37.33 kg/m2        Blood Pressure from Last 3 Encounters:   07/25/17 108/68   06/28/17 107/72   06/22/17 108/69    Weight from Last 3 Encounters:   07/25/17 217 lb 8 oz (98.7 kg)   06/28/17 238 lb 3.2 oz (108 kg)   06/22/17 236 lb 14.4 oz (107.5 kg)              Today, you had the following     No orders found for display         Today's Medication Changes          These changes are accurate as of: 7/25/17 11:22 AM.  If you have any questions, ask your nurse or doctor.               Start taking these medicines.        Dose/Directions    fluconazole 150 MG tablet   Commonly known as:  DIFLUCAN   Used for:  Intertriginous candidiasis   Started by:  Shahrzad Henriquez MD        Dose:  150 mg   Take 1 tablet (150 mg) by mouth every 3 days   Quantity:  4 tablet   Refills:  0            Where to get your medicines      These medications were sent to [facility]/pharmacy #[id number] [facility] [address] [address]     Phone:  [phone]     fluconazole 150 MG tablet                Primary Care Provider Office Phone # Fax #    Susanne Morales [phone]       [address] [address]        Goals        General    Psychosocial (pt-stated)     Notes - Note edited  9/20/2016  1:05 PM by Shahrzad Hernandez BSW    As of today's date 9/20/2016 goal is met at 26 - 50%.   Goal Status:  Discontinued   SW unable to contact pt X3  As of today's date 6/23/2016 goal is met at 26 - 50%.   Goal Status:  Active   Pt's mother is continuing to work with county financial worker to get her 2 year old son onto Medical Assistance                                                                                  BOLA Gauthier          Equal Access to Services     LALI NAVARRO AH: agnes Scherer,  amos vasquezjuan manuel valentínrickey schmitz riverain hayaan adeeg kharash la'aan ah. So [facility] [phone].    ATENCIÓN: Si jane gilmore, tiene a deal disposición servicios gratuitos de asistencia lingüística. Michelle al [phone].    We comply with applicable federal civil rights laws and [place] laws. We do not discriminate on the basis of race, color, national origin, age, disability sex, sexual orientation or gender identity.            Thank you!     Thank you for choosing [facility]  for your care. Our goal is always to provide you with excellent care. Hearing back from our patients is one way we can continue to improve our services. Please take a few minutes to complete the written survey that you may receive in the mail after your visit with us. Thank you!             Your Updated Medication List - Protect others around you: Learn how to safely use, store and throw away your medicines at www.disposemymeds.org.          This list is accurate as of: 7/25/17 11:22 AM.  Always use your most recent med list.                   Brand Name Dispense Instructions for use Diagnosis    * albuterol 108 (90 BASE) MCG/ACT Inhaler    PROAIR HFA/PROVENTIL HFA/VENTOLIN HFA    1 Inhaler    Inhale 2 puffs into the lungs every 4 hours as needed for shortness of breath / dyspnea or wheezing    Intermittent asthma, uncomplicated       * albuterol (2.5 MG/3ML) 0.083% neb solution     25 vial    Take 1 vial (2.5 mg) by nebulization every 6 hours as needed for shortness of breath / dyspnea or wheezing    Mild persistent asthma with acute exacerbation       blood glucose monitoring lancets     1 Box    Use to check blood sugars 4 times a day    Encounter for supervision of other normal pregnancy, Abnormal glucose tolerance test, Status post gastric banding       blood glucose monitoring test strip    FIDEL CONTOUR NEXT    100 strip    Use to check blood glucose 4 times daily (Before breakfast and 1 hour after start of each meal)     Encounter for supervision of other normal pregnancy, Abnormal glucose tolerance test, Status post gastric banding       calcium carbonate 500 MG chewable tablet    TUMS          fluconazole 150 MG tablet    DIFLUCAN    4 tablet    Take 1 tablet (150 mg) by mouth every 3 days    Intertriginous candidiasis       fluticasone-salmeterol 100-50 MCG/DOSE diskus inhaler    ADVAIR     Inhale 1 puff into the lungs every 12 hours        meclizine 25 MG tablet    ANTIVERT    30 tablet    Take 1 tablet (25 mg) by mouth every 6 hours as needed for dizziness    Dizziness       PRENATAL VITAMIN PO      Take 1 tablet by mouth daily        TYLENOL PO      Take by mouth as needed for mild pain or fever        * Notice:  This list has 2 medication(s) that are the same as other medications prescribed for you. Read the directions carefully, and ask your doctor or other care provider to review them with you.

## 2017-07-26 ASSESSMENT — ANXIETY QUESTIONNAIRES: GAD7 TOTAL SCORE: 1

## 2017-07-26 ASSESSMENT — ASTHMA QUESTIONNAIRES: ACT_TOTALSCORE: 20

## 2017-07-26 ASSESSMENT — PATIENT HEALTH QUESTIONNAIRE - PHQ9: SUM OF ALL RESPONSES TO PHQ QUESTIONS 1-9: 2

## 2017-07-29 ENCOUNTER — TRANSFERRED RECORDS (OUTPATIENT)
Dept: HEALTH INFORMATION MANAGEMENT | Facility: CLINIC | Age: 27
End: 2017-07-29

## 2017-08-01 NOTE — TELEPHONE ENCOUNTER
Received fax from pharmacy stating patient requires Prior Authorization for Fluconazole 150mg    Insurance information:  Name: Madiha Franklin MCO: [place]  Phone number: [phone]  ID number: [id number]  BIN#: [id number]  #:[id number]  N#:[id number]      Provider to address. Message route to Dr. Henriquez. Initiate prior authorization or change medication?  Please advise.  ** Insurance Plan limits 2 tablets per 30 days**  An SAJAN Arzola      **If a prior authorization is to be initiated, please list the following:    -Any medications the patient has tried and failed or any contraindications. Topical creams    -Is the patient currently on this medication, or has tried before?  Has tried topical creams    -What is the diagnosis? Severe candida infection in her axillary areas     - Justification or other information that me by helpful.  She needs this medication as prescribed to heal her infection and prevent worsening infection    JOSE ALFREDO HENRIQUEZ M.D.

## 2017-08-03 ENCOUNTER — PRENATAL OFFICE VISIT (OUTPATIENT)
Dept: OBGYN | Facility: CLINIC | Age: 27
End: 2017-08-03
Payer: COMMERCIAL

## 2017-08-03 VITALS
BODY MASS INDEX: 37.25 KG/M2 | SYSTOLIC BLOOD PRESSURE: 107 MMHG | WEIGHT: 217 LBS | TEMPERATURE: 97.6 F | DIASTOLIC BLOOD PRESSURE: 67 MMHG | HEART RATE: 47 BPM

## 2017-08-03 DIAGNOSIS — R87.610 ASCUS WITH POSITIVE HIGH RISK HPV CERVICAL: ICD-10-CM

## 2017-08-03 DIAGNOSIS — Z30.013 ENCOUNTER FOR INITIAL PRESCRIPTION OF INJECTABLE CONTRACEPTIVE: ICD-10-CM

## 2017-08-03 DIAGNOSIS — R87.810 ASCUS WITH POSITIVE HIGH RISK HPV CERVICAL: ICD-10-CM

## 2017-08-03 PROCEDURE — 99207 ZZC POST PARTUM EXAM: CPT | Performed by: OBSTETRICS & GYNECOLOGY

## 2017-08-03 PROCEDURE — 88175 CYTOPATH C/V AUTO FLUID REDO: CPT | Performed by: OBSTETRICS & GYNECOLOGY

## 2017-08-03 PROCEDURE — 87624 HPV HI-RISK TYP POOLED RSLT: CPT | Performed by: OBSTETRICS & GYNECOLOGY

## 2017-08-03 RX ORDER — MEDROXYPROGESTERONE ACETATE 150 MG/ML
150 INJECTION, SUSPENSION INTRAMUSCULAR
Qty: 1 ML | Refills: 4 | Status: SHIPPED | OUTPATIENT
Start: 2017-08-03 | End: 2018-06-16

## 2017-08-03 NOTE — NURSING NOTE
"Chief Complaint   Patient presents with     Post Partum Exam       Initial /67  Pulse (!) 47  Temp 97.6  F (36.4  C) (Oral)  Wt 217 lb (98.4 kg)  LMP  (LMP Unknown)  BMI 37.25 kg/m2 Estimated body mass index is 37.25 kg/(m^2) as calculated from the following:    Height as of 6/6/17: 5' 4\" (1.626 m).    Weight as of this encounter: 217 lb (98.4 kg).  Medication Reconciliation: complete   Taniya Perez MA      "
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
## 2017-08-03 NOTE — LETTER
[facility]  [address] [place] [id number] [phone]    Gloria Sun    To whom it may concern:    Gloria has been under our care for her pregnancy.  She may return to work as of 8/3/2017, without restriction.    Thank you.    Ravi Ashraf MD FACOG  August 3, 2017

## 2017-08-03 NOTE — MR AVS SNAPSHOT
After Visit Summary   8/3/2017    Gloria Sun    MRN: [id number]           Patient Information     Date Of Birth          1990        Visit Information        Provider Department      8/3/2017 8:45 AM Ravi Ashraf MD [facility]        Today's Diagnoses     Postpartum care and examination    -  1    ASCUS with positive high risk HPV cervical        Encounter for initial prescription of injectable contraceptive           Follow-ups after your visit        Your next 10 appointments already scheduled     Aug 10, 2017 11:00 AM CDT   Post Partum with Halley Hernandez DO   [facility] ([facility])    [address] [place] [address] [phone]              Who to contact     If you have questions or need follow up information about today's clinic visit or your schedule please contact [facility] directly at [phone].  Normal or non-critical lab and imaging results will be communicated to you by MyChart, letter or phone within 4 business days after the clinic has received the results. If you do not hear from us within 7 days, please contact the clinic through Glaukoshart or phone. If you have a critical or abnormal lab result, we will notify you by phone as soon as possible.  Submit refill requests through Snappy shuttle or call your pharmacy and they will forward the refill request to us. Please allow 3 business days for your refill to be completed.          Additional Information About Your Visit        MyChart Information     Snappy shuttle gives you secure access to your electronic health record. If you see a primary care provider, you can also send messages to your care team and make appointments. If you have questions, please call your primary care clinic.  If you do not have a primary care provider, please call [phone] and they will assist you.        Care EveryWhere ID     This is your Care EveryWhere ID. This  could be used by other organizations to access your [facility] medical records  [id number]        Your Vitals Were     Pulse Temperature Last Period BMI (Body Mass Index)          47 97.6  F (36.4  C) (Oral) (LMP Unknown) 37.25 kg/m2         Blood Pressure from Last 3 Encounters:   08/03/17 107/67   07/25/17 108/68   06/28/17 107/72    Weight from Last 3 Encounters:   08/03/17 217 lb (98.4 kg)   07/25/17 217 lb 8 oz (98.7 kg)   06/28/17 238 lb 3.2 oz (108 kg)              We Performed the Following     HPV High Risk Types DNA Cervical     Pap imaged thin layer diagnostic with HPV (select HPV order below)          Today's Medication Changes          These changes are accurate as of: 8/3/17  9:06 AM.  If you have any questions, ask your nurse or doctor.               Start taking these medicines.        Dose/Directions    medroxyPROGESTERone 150 MG/ML injection   Commonly known as:  DEPO-PROVERA   Used for:  Encounter for initial prescription of injectable contraceptive   Started by:  Ravi Ashraf MD        Dose:  150 mg   Inject 1 mL (150 mg) into the muscle every 3 months   Quantity:  1 mL   Refills:  4            Where to get your medicines      Some of these will need a paper prescription and others can be bought over the counter.  Ask your nurse if you have questions.     Bring a paper prescription for each of these medications     medroxyPROGESTERone 150 MG/ML injection                Primary Care Provider Office Phone # Fax #    Susanne Morales [phone]       [address] [address]        Goals        General    Psychosocial (pt-stated)     Notes - Note edited  9/20/2016  1:05 PM by Shahrzad Hernandez BSW    As of today's date 9/20/2016 goal is met at 26 - 50%.   Goal Status:  Discontinued   SW unable to contact pt X3  As of today's date 6/23/2016 goal is met at 26 - 50%.   Goal Status:  Active   Pt's mother is continuing to work with [facility]  financial worker to get her 2 year old son onto Medical Assistance                                                                                  John Hernandez, LSW          Equal Access to Services     LALI NAVARRO : Hadii aad ku hadjianfredis [place], waaxda patriciaadaha, qaybta vasqueznereydatone collier, rickey crystalrichardraissa al. So [facility] [phone].    ATENCIÓN: Si habla español, tiene a deal disposición servicios gratuitos de asistencia lingüística. Llame al [phone].    We comply with applicable federal civil rights laws and [place] laws. We do not discriminate on the basis of race, color, national origin, age, disability sex, sexual orientation or gender identity.            Thank you!     Thank you for choosing [facility]  [facility]. Our goal is always to provide you with excellent care. Hearing back from our patients is one way we can continue to improve our services. Please take a few minutes to complete the written survey that you may receive in the mail after your visit with us. Thank you!             Your Updated Medication List - Protect others around you: Learn how to safely use, store and throw away your medicines at www.disposemymeds.org.          This list is accurate as of: 8/3/17  9:06 AM.  Always use your most recent med list.                   Brand Name Dispense Instructions for use Diagnosis    * albuterol 108 (90 BASE) MCG/ACT Inhaler    PROAIR HFA/PROVENTIL HFA/VENTOLIN HFA    1 Inhaler    Inhale 2 puffs into the lungs every 4 hours as needed for shortness of breath / dyspnea or wheezing    Intermittent asthma, uncomplicated       * albuterol (2.5 MG/3ML) 0.083% neb solution     25 vial    Take 1 vial (2.5 mg) by nebulization every 6 hours as needed for shortness of breath / dyspnea or wheezing    Mild persistent asthma with acute exacerbation       blood glucose monitoring lancets     1 Box    Use to check blood sugars 4 times a day    Encounter for supervision of  other normal pregnancy, Abnormal glucose tolerance test, Status post gastric banding       blood glucose monitoring test strip    FIDEL CONTOUR NEXT    100 strip    Use to check blood glucose 4 times daily (Before breakfast and 1 hour after start of each meal)    Encounter for supervision of other normal pregnancy, Abnormal glucose tolerance test, Status post gastric banding       calcium carbonate 500 MG chewable tablet    TUMS          fluconazole 150 MG tablet    DIFLUCAN    4 tablet    Take 1 tablet (150 mg) by mouth every 3 days    Intertriginous candidiasis       fluticasone-salmeterol 100-50 MCG/DOSE diskus inhaler    ADVAIR     Inhale 1 puff into the lungs every 12 hours        meclizine 25 MG tablet    ANTIVERT    30 tablet    Take 1 tablet (25 mg) by mouth every 6 hours as needed for dizziness    Dizziness       medroxyPROGESTERone 150 MG/ML injection    DEPO-PROVERA    1 mL    Inject 1 mL (150 mg) into the muscle every 3 months    Encounter for initial prescription of injectable contraceptive       PRENATAL VITAMIN PO      Take 1 tablet by mouth daily        TYLENOL PO      Take by mouth as needed for mild pain or fever        * Notice:  This list has 2 medication(s) that are the same as other medications prescribed for you. Read the directions carefully, and ask your doctor or other care provider to review them with you.

## 2017-08-07 LAB
COPATH REPORT: NORMAL
PAP: NORMAL

## 2017-08-08 LAB
FINAL DIAGNOSIS: ABNORMAL
HPV HR 12 DNA CVX QL NAA+PROBE: POSITIVE
HPV16 DNA SPEC QL NAA+PROBE: NEGATIVE
HPV18 DNA SPEC QL NAA+PROBE: NEGATIVE
SPECIMEN DESCRIPTION: ABNORMAL

## 2017-08-08 NOTE — TELEPHONE ENCOUNTER
Could not get PA to go thru CoverMyMeds so I called and they gave me Approval good from 07/09/2017 to 08/08/2018 approval # [id number]. Called Pharmacy and informed them of this information and they will let patient know.  Jacquie Israel,

## 2017-09-19 ENCOUNTER — ALLIED HEALTH/NURSE VISIT (OUTPATIENT)
Dept: NURSING | Facility: CLINIC | Age: 27
End: 2017-09-19
Payer: COMMERCIAL

## 2017-09-19 DIAGNOSIS — Z30.42 DEPO-PROVERA CONTRACEPTIVE STATUS: Primary | ICD-10-CM

## 2017-09-19 LAB — BETA HCG QUAL IFA URINE: NEGATIVE

## 2017-09-19 PROCEDURE — 99207 ZZC NO CHARGE NURSE ONLY: CPT

## 2017-09-19 PROCEDURE — 96372 THER/PROPH/DIAG INJ SC/IM: CPT

## 2017-09-19 PROCEDURE — 84703 CHORIONIC GONADOTROPIN ASSAY: CPT | Performed by: OBSTETRICS & GYNECOLOGY

## 2017-09-19 NOTE — MR AVS SNAPSHOT
After Visit Summary   9/19/2017    Gloria Sun    MRN: [id number]           Patient Information     Date Of Birth          1990        Visit Information        Provider Department      9/19/2017 12:00 PM AN ANCILLARY [facility]        Today's Diagnoses     Depo-Provera contraceptive status    -  1       Follow-ups after your visit        Who to contact     If you have questions or need follow up information about today's clinic visit or your schedule please contact [facility] directly at [phone].  Normal or non-critical lab and imaging results will be communicated to you by Mobimhart, letter or phone within 4 business days after the clinic has received the results. If you do not hear from us within 7 days, please contact the clinic through Escomt or phone. If you have a critical or abnormal lab result, we will notify you by phone as soon as possible.  Submit refill requests through [employer] or call your pharmacy and they will forward the refill request to us. Please allow 3 business days for your refill to be completed.          Additional Information About Your Visit        MyChart Information     [employer] gives you secure access to your electronic health record. If you see a primary care provider, you can also send messages to your care team and make appointments. If you have questions, please call your primary care clinic.  If you do not have a primary care provider, please call [phone] and they will assist you.        Care EveryWhere ID     This is your Care EveryWhere ID. This could be used by other organizations to access your [place] medical records  [id number]        Your Vitals Were     Last Period                   (LMP Unknown)            Blood Pressure from Last 3 Encounters:   08/03/17 107/67   07/25/17 108/68   06/28/17 107/72    Weight from Last 3 Encounters:   08/03/17 217 lb (98.4 kg)   07/25/17 217 lb 8 oz (98.7 kg)   06/28/17 238 lb 3.2  oz (108 kg)              We Performed the Following     Beta HCG qual IFA urine     C Medroxyprogesterone inj/1mg        Primary Care Provider Office Phone # Fax #    Susanne Morales [phone] [address] [facility] [place] [address]        Goals        General    Psychosocial (pt-stated)     Notes - Note edited  9/20/2016  1:05 PM by Shahrzad Hernandez, BSW    As of today's date 9/20/2016 goal is met at 26 - 50%.   Goal Status:  Discontinued   SW unable to contact pt X3  As of today's date 6/23/2016 goal is met at 26 - 50%.   Goal Status:  Active   Pt's mother is continuing to work with county financial worker to get her 2 year old son onto Medical Assistance                                                                                  DEVORA GauthierW          Equal Access to Services     YOHANA NAVARRO : Hadii aad isabella hadasho Soomaali, waaxda luqadaha, qaybta kaalmada adeegyada, rickey salmon . So [facility] [phone].    ATENCIÓN: Si habla español, tiene a deal disposición servicios gratuitos de asistencia lingüística. Llame al [phone].    We comply with applicable federal civil rights laws and [place] laws. We do not discriminate on the basis of race, color, national origin, age, disability sex, sexual orientation or gender identity.            Thank you!     [facility] you for [facility]  [facility]. Our goal is always to provide you with excellent care. Hearing back from our patients is one way we can continue to improve our services. Please take a few minutes to complete the written survey that you may receive in the mail after your visit with us. Thank you!             Your Updated Medication List - Protect others around you: Learn how to safely use, store and throw away your medicines at www.disposemymeds.org.          This list is accurate as of: 9/19/17 12:38 PM.  Always use your most recent med list.                   Brand Name  Dispense Instructions for use Diagnosis    * albuterol 108 (90 BASE) MCG/ACT Inhaler    PROAIR HFA/PROVENTIL HFA/VENTOLIN HFA    1 Inhaler    Inhale 2 puffs into the lungs every 4 hours as needed for shortness of breath / dyspnea or wheezing    Intermittent asthma, uncomplicated       * albuterol (2.5 MG/3ML) 0.083% neb solution     25 vial    Take 1 vial (2.5 mg) by nebulization every 6 hours as needed for shortness of breath / dyspnea or wheezing    Mild persistent asthma with acute exacerbation       blood glucose monitoring lancets     1 Box    Use to check blood sugars 4 times a day    Encounter for supervision of other normal pregnancy, Abnormal glucose tolerance test, Status post gastric banding       blood glucose monitoring test strip    [employer] CONTOUR NEXT    100 strip    Use to check blood glucose 4 times daily (Before breakfast and 1 hour after start of each meal)    Encounter for supervision of other normal pregnancy, Abnormal glucose tolerance test, Status post gastric banding       calcium carbonate 500 MG chewable tablet    TUMS          fluconazole 150 MG tablet    DIFLUCAN    4 tablet    Take 1 tablet (150 mg) by mouth every 3 days    Intertriginous candidiasis       fluticasone-salmeterol 100-50 MCG/DOSE diskus inhaler    ADVAIR     Inhale 1 puff into the lungs every 12 hours        meclizine 25 MG tablet    ANTIVERT    30 tablet    Take 1 tablet (25 mg) by mouth every 6 hours as needed for dizziness    Dizziness       medroxyPROGESTERone 150 MG/ML injection    DEPO-PROVERA    1 mL    Inject 1 mL (150 mg) into the muscle every 3 months    Encounter for initial prescription of injectable contraceptive       PRENATAL VITAMIN PO      Take 1 tablet by mouth daily        TYLENOL PO      Take by mouth as needed for mild pain or fever        * Notice:  This list has 2 medication(s) that are the same as other medications prescribed for you. Read the directions carefully, and ask your doctor or other care  provider to review them with you.

## 2017-09-19 NOTE — NURSING NOTE
BP: Data Unavailable                       URINE HCG:negative    The following medication was given:     MEDICATION: Depo Provera 150mg  ROUTE: IM  SITE: Arm - Right  : [employer]  LOT #: [id number]  EXP:2/2020  NEXT INJECTION DUE: 12/5/17 - 12/19/17   Provider: DEC 5- 19TH 2017  Alix Drew MA      Patient just over her period, no intercourse X 3weeks. Forgets to take BCP, had local reaction to previous Depo Injection. Requests injection in her arm  Alix Drew MA

## 2017-10-11 NOTE — PROGRESS NOTES
SUBJECTIVE:   Gloria Sun is a 27 year old female who presents to clinic today for the following health issues:      Abnormal Mood Symptoms      Duration: 6 weeks    Description:  Depression: no  Anxiety: YES  Panic attacks: no     Accompanying signs and symptoms: see PHQ-9 and PORTER scores    History (similar episodes/previous evaluation): Yes, back in 2015     Precipitating or alleviating factors: None    Therapies tried and outcome: Celexa (Citalopram) in the past which worked well        Abnormal Mood Symptoms  Onset: 6 weeks    Description:   Depression: no  Anxiety: YES    Accompanying Signs & Symptoms:  Still participating in activities that you used to enjoy: yes -most of the Time  Fatigue: YES-   Irritability: no  Difficulty concentrating: no  Changes in appetite: YES  Problems with sleep: no  Heart racing/beating fast : no  Thoughts of hurting yourself or others: none    History:   Recent stress: no  Prior depression hospitalization: None  Family history of depression: YES- mom  Family history of anxiety: MOm    Precipitating factors:   Alcohol/drug use: no    Alleviating factors:      Therapies Tried and outcome: Celexa (Citalopram) in the oast and has done well      Problem list and histories reviewed & adjusted, as indicated.  Additional history: as documented  Asthma Follow-Up    Was ACT completed today?    Yes    ACT Total Scores 10/17/2017   ACT TOTAL SCORE -   ASTHMA ER VISITS -   ASTHMA HOSPITALIZATIONS -   ACT TOTAL SCORE (Goal Greater than or Equal to 20) 21   In the past 12 months, how many times did you visit the emergency room for your asthma without being admitted to the hospital? 0   In the past 12 months, how many times were you hospitalized overnight because of your asthma? 0                   Patient Active Problem List   Diagnosis     CARDIOVASCULAR SCREENING; LDL GOAL LESS THAN 160     Chronic jaw pain     Status post gastric banding     Gilbert's syndrome     Generalized  hyperhidrosis     Obesity     Intermittent asthma, uncomplicated     Health Care Home     Post concussion syndrome     Need for Tdap vaccination     Encounter for supervision of other normal pregnancy     Rh negative state in antepartum period     ASCUS with positive high risk HPV cervical     Abnormal glucose tolerance test-No show and Cancel Diab Ed x 2     Past Surgical History:   Procedure Laterality Date     ESOPHAGOSCOPY, GASTROSCOPY, DUODENOSCOPY (EGD), COMBINED Left 12/31/2014    Procedure: COMBINED ESOPHAGOSCOPY, GASTROSCOPY, DUODENOSCOPY (EGD), BIOPSY SINGLE OR MULTIPLE;  Surgeon: Ilan Marrero MD;  Location: [facility]     LAPAROSCOPIC CHOLECYSTECTOMY  2/21/2014    Procedure: LAPAROSCOPIC CHOLECYSTECTOMY;  Laparoscopic Cholecystectomy;  Surgeon: Ilan Marrero MD;  Location: [facility] OR     LAPAROSCOPIC GASTRIC SLEEVE  1/21/2013    Procedure: LAPAROSCOPIC GASTRIC SLEEVE;  Laparoscopic Sleeve Gastrectomy;  Surgeon: Chato Mathews MD;  Location: [facility] OR     MOUTH SURGERY  2009       Social History   Substance Use Topics     Smoking status: Never Smoker     Smokeless tobacco: Never Used     Alcohol use No     Family History   Problem Relation Age of Onset     Lipids Mother      Anxiety Disorder Mother      Depression Mother      Hypertension Mother      Obesity Mother      CANCER Father      skin     DIABETES Father      Hypertension Father      Obesity Father      Hypertension Maternal Grandmother      C.A.D. Maternal Grandmother      CABG     Lipids Maternal Grandmother      Depression Maternal Grandmother      Obesity Maternal Grandmother      C.A.D. Maternal Grandfather      CABG, MI      Asthma Maternal Grandfather      CANCER Maternal Grandfather      Respiratory Maternal Grandfather      Lipids Maternal Grandfather      Hypertension Maternal Grandfather      Alzheimer Disease Maternal Grandfather      Depression Maternal Grandfather      Obesity Maternal Grandfather      CEREBROVASCULAR DISEASE  "Paternal Grandmother      Arthritis Paternal Grandmother      d. lupus     Obesity Paternal Grandmother      HEART DISEASE Paternal Grandfather      Lipids Paternal Grandfather      Thyroid Disease No family hx of      Glaucoma No family hx of      Macular Degeneration No family hx of          Current Outpatient Prescriptions   Medication Sig Dispense Refill     citalopram (CELEXA) 20 MG tablet Take 1/2 tablet (10 mg) for 1-2 weeks, then increase to 1 tablet orally daily 30 tablet 0     medroxyPROGESTERone (DEPO-PROVERA) 150 MG/ML injection Inject 1 mL (150 mg) into the muscle every 3 months 1 mL 4     Acetaminophen (TYLENOL PO) Take by mouth as needed for mild pain or fever       albuterol (2.5 MG/3ML) 0.083% neb solution Take 1 vial (2.5 mg) by nebulization every 6 hours as needed for shortness of breath / dyspnea or wheezing 25 vial 0     albuterol (PROAIR HFA, PROVENTIL HFA, VENTOLIN HFA) 108 (90 BASE) MCG/ACT inhaler Inhale 2 puffs into the lungs every 4 hours as needed for shortness of breath / dyspnea or wheezing 1 Inhaler 2     Allergies   Allergen Reactions     Codeine Nausea and Vomiting     Desogen [Apri]      Hot flashes     Desogestrel-Ethinyl Estradiol Nausea     Dust Mites      Fluoxetine      Irritable, easy bruising     Magnesium Sulfate Injection      Burning, hotflashes     Medroxyprogesterone Unknown     Swelling at injection site     Morphine      \"Weight loss surgery so it burns the inside of my stomach\"     Venlafaxine      Agitation, anxiety      Recent Labs   Lab Test  03/28/16   1045  11/04/15   1324  12/29/14   1218  02/25/14   1842  02/20/14   1742   11/18/13   1136  07/18/13   1507   02/27/13   1102   01/13/10   1135   A1C   --    --   5.1   --   5.4   --    --   4.7   < >   --    --    --    LDL   --    --   68   --   133*   --   117   --    < >   --    < >   --    HDL   --    --   32*   --   41*   --   45*   --    < >   --    < >   --    TRIG   --    --   103   --   167*   --   174*   " "--    < >   --    < >   --    ALT   --   16  165*  55*  144*   --   18  21   < >   --    < >   --    CR  0.71  0.70   --   0.82  0.87   < >  0.49*  0.49*   < >   --    < >   --    GFRESTIMATED  >90  Non  GFR Calc    >90  Non  GFR Calc     --   87  81   < >  >90  >90   < >   --    < >   --    GFRESTBLACK  >90   GFR Calc    >90   GFR Calc     --   >90  >90   < >  >90  >90   < >   --    < >   --    POTASSIUM  3.8  3.9   --   4.0  3.2*   < >  3.7  3.7   < >   --    < >   --    TSH   --    --    --    --    --    --    --    --    --   1.11   --   1.71    < > = values in this interval not displayed.      BP Readings from Last 3 Encounters:   10/17/17 102/70   08/03/17 107/67   07/25/17 108/68    Wt Readings from Last 3 Encounters:   10/17/17 225 lb (102.1 kg)   08/03/17 217 lb (98.4 kg)   07/25/17 217 lb 8 oz (98.7 kg)                  Labs reviewed in EPIC          Reviewed and updated as needed this visit by clinical staff  Tobacco  Allergies  Meds  Problems  Med Hx  Surg Hx  Fam Hx  Soc Hx        Reviewed and updated as needed this visit by Provider  Allergies  Meds  Problems         ROS:  C: NEGATIVE for fever, chills, change in weight  E/M: NEGATIVE for ear, mouth and throat problems  R: NEGATIVE for significant cough or SOB  CV: NEGATIVE for chest pain, palpitations or peripheral edema  : normal menstrual cycles  NEURO: NEGATIVE for weakness, dizziness or paresthesias    OBJECTIVE:     /70 (BP Location: Left arm, Patient Position: Chair, Cuff Size: Adult Regular)  Pulse 67  Temp 98.4  F (36.9  C) (Oral)  Ht 5' 4\" (1.626 m)  Wt 225 lb (102.1 kg)  LMP 09/16/2017  SpO2 97%  BMI 38.62 kg/m2  Body mass index is 38.62 kg/(m^2).  GENERAL: healthy, alert and no distress  NECK: no adenopathy, no asymmetry, masses, or scars and thyroid normal to palpation  RESP: lungs clear to auscultation - no rales, rhonchi or wheezes  CV: regular rate " "and rhythm, normal S1 S2, no S3 or S4, no murmur, click or rub, no peripheral edema and peripheral pulses strong  ABDOMEN: soft, nontender, no hepatosplenomegaly, no masses and bowel sounds normal  MS: no gross musculoskeletal defects noted, no edema  PSYCH: mentation appears normal, affect normal/bright    Diagnostic Test Results:  none     ASSESSMENT/PLAN:         BMI:   Estimated body mass index is 38.62 kg/(m^2) as calculated from the following:    Height as of this encounter: 5' 4\" (1.626 m).    Weight as of this encounter: 225 lb (102.1 kg).   Weight management plan: low digna diet          ICD-10-CM    1. Anxiety F41.9 citalopram (CELEXA) 20 MG tablet   2. Intermittent asthma, uncomplicated J45.20    3. Need for prophylactic vaccination and inoculation against influenza Z23 FLU VAC, SPLIT VIRUS IM > 3 YO (QUADRIVALENT) [97082]     Vaccine Administration, Initial [27927]     I've explained to her that drugs of the SSRI class can have side effects such as weight gain, sexual dysfunction, insomnia, headache, nausea. These medications are generally effective at alleviating symptoms of anxiety and/or depression. Let me know if significant side effects do occur.  SEE [facility] care orders  The potential side effects of this medication have been discussed with the patient.  Call if any significant problems with these are experienced.  Follow up 3 weeks  Asthma is stable   See IRIS Pugh MD  [facility]  "

## 2017-10-11 NOTE — PATIENT INSTRUCTIONS
[facility]    If you have any questions regarding to your visit please contact your care team:       Team Red:   Clinic Hours Telephone Number   Dr. Susanne Worley  (pediatrics)  Gail Canales NP 7am-7pm  Monday - Thursday   7am-5pm  Fridays  [phone] (fax)    Jitendra CHAN  [phone]   Urgent Care - [place] and [place] Monday-Friday  [place] - 11am-8pm  Saturday-Sunday  Both sites - 9am-5pm  [phone] - [facility]  [phone] - [place]       What options do I have for visits at the clinic other than the traditional office visit?  To expand how we care for you, many of our providers are utilizing electronic visits (e-visits) and telephone visits, when medically appropriate, for interactions with their patients rather than a visit in the clinic.   We also offer nurse visits for many medical concerns. Just like any other service, we will bill your insurance company for this type of visit based on time spent on the phone with your provider. Not all insurance companies cover these visits. Please check with your medical insurance if this type of visit is covered. You will be responsible for any charges that are not paid by your insurance.      E-visits via [employer]:  generally incur a $35.00 fee.  Telephone visits:  Time spent on the phone: *charged based on time that is spent on the phone in increments of 10 minutes. Estimated cost:   5-10 mins $30.00   11-20 mins. $59.00   21-30 mins. $85.00     As always, Thank you for trusting us with your health care needs!            Discharge SAJAN Webber CMA

## 2017-10-17 ENCOUNTER — OFFICE VISIT (OUTPATIENT)
Dept: FAMILY MEDICINE | Facility: CLINIC | Age: 27
End: 2017-10-17
Payer: COMMERCIAL

## 2017-10-17 VITALS
HEART RATE: 67 BPM | TEMPERATURE: 98.4 F | BODY MASS INDEX: 38.41 KG/M2 | SYSTOLIC BLOOD PRESSURE: 102 MMHG | WEIGHT: 225 LBS | OXYGEN SATURATION: 97 % | DIASTOLIC BLOOD PRESSURE: 70 MMHG | HEIGHT: 64 IN

## 2017-10-17 DIAGNOSIS — Z23 NEED FOR PROPHYLACTIC VACCINATION AND INOCULATION AGAINST INFLUENZA: ICD-10-CM

## 2017-10-17 DIAGNOSIS — F41.9 ANXIETY: Primary | ICD-10-CM

## 2017-10-17 DIAGNOSIS — J45.20 INTERMITTENT ASTHMA, UNCOMPLICATED: ICD-10-CM

## 2017-10-17 PROCEDURE — 99214 OFFICE O/P EST MOD 30 MIN: CPT | Mod: 25 | Performed by: FAMILY MEDICINE

## 2017-10-17 PROCEDURE — 90471 IMMUNIZATION ADMIN: CPT | Performed by: FAMILY MEDICINE

## 2017-10-17 PROCEDURE — 90686 IIV4 VACC NO PRSV 0.5 ML IM: CPT | Performed by: FAMILY MEDICINE

## 2017-10-17 RX ORDER — CITALOPRAM HYDROBROMIDE 20 MG/1
TABLET ORAL
Qty: 30 TABLET | Refills: 0 | Status: SHIPPED | OUTPATIENT
Start: 2017-10-17 | End: 2017-11-14

## 2017-10-17 ASSESSMENT — ANXIETY QUESTIONNAIRES
5. BEING SO RESTLESS THAT IT IS HARD TO SIT STILL: NOT AT ALL
6. BECOMING EASILY ANNOYED OR IRRITABLE: NEARLY EVERY DAY
GAD7 TOTAL SCORE: 10
1. FEELING NERVOUS, ANXIOUS, OR ON EDGE: MORE THAN HALF THE DAYS
3. WORRYING TOO MUCH ABOUT DIFFERENT THINGS: SEVERAL DAYS
7. FEELING AFRAID AS IF SOMETHING AWFUL MIGHT HAPPEN: SEVERAL DAYS
IF YOU CHECKED OFF ANY PROBLEMS ON THIS QUESTIONNAIRE, HOW DIFFICULT HAVE THESE PROBLEMS MADE IT FOR YOU TO DO YOUR WORK, TAKE CARE OF THINGS AT HOME, OR GET ALONG WITH OTHER PEOPLE: VERY DIFFICULT
2. NOT BEING ABLE TO STOP OR CONTROL WORRYING: MORE THAN HALF THE DAYS

## 2017-10-17 ASSESSMENT — PATIENT HEALTH QUESTIONNAIRE - PHQ9
5. POOR APPETITE OR OVEREATING: SEVERAL DAYS
SUM OF ALL RESPONSES TO PHQ QUESTIONS 1-9: 4

## 2017-10-17 NOTE — MR AVS SNAPSHOT
After Visit Summary   10/17/2017    Gloria Sun    MRN: [id number]           Patient Information     Date Of Birth          1990        Visit Information        Provider Department      10/17/2017 10:50 AM Edda Pugh MD [facility]        Today's Diagnoses     Need for prophylactic vaccination and inoculation against influenza    -  1    Anxiety          Care Instructions    [facility]    If you have any questions regarding to your visit please contact your care team:       Team Red:   Clinic Hours Telephone Number   Dr. Susanne Worley  (pediatrics)  Gail Canales NP 7am-7pm  Monday - Thursday   7am-5pm  Fridays  [phone] (fax)    Jitendra CHAN  [phone]   Urgent Care - [place] and [place] Monday-Friday  [place] - 11am-8pm  Saturday-Sunday  Both sites - 9am-5pm  [phone] - [facility]  [phone] [facility]       What options do I have for visits at the clinic other than the traditional office visit?  To expand how we care for you, many of our providers are utilizing electronic visits (e-visits) and telephone visits, when medically appropriate, for interactions with their patients rather than a visit in the clinic.   We also offer nurse visits for many medical concerns. Just like any other service, we will bill your insurance company for this type of visit based on time spent on the phone with your provider. Not all insurance companies cover these visits. Please check with your medical insurance if this type of visit is covered. You will be responsible for any charges that are not paid by your insurance.      E-visits via [employer]:  generally incur a $35.00 fee.  Telephone visits:  Time spent on the phone: *charged based on time that is spent on the phone in increments of 10 minutes. Estimated cost:   5-10 mins $30.00   11-20 mins. $59.00   21-30 mins. $85.00     As always, Thank you for  "trusting us with your health care needs!            Discharge SAJAN Webber  [facility]            Follow-ups after your visit        Your next 10 appointments already scheduled     Oct 20, 2017  9:15 AM CDT   Nurse Only with [facility]   [facility] ([facility])    [address]  [place] [address] [phone]              Who to contact     If you have questions or need follow up information about today's clinic visit or your schedule please contact [facility] directly at [phone].  Normal or non-critical lab and imaging results will be communicated to you by [employer], letter or phone within 4 business days after the clinic has received the results. If you do not hear from us within 7 days, please contact the clinic through [employer] or phone. If you have a critical or abnormal lab result, we will notify you by phone as soon as possible.  Submit refill requests through [employer] or call your pharmacy and they will forward the refill request to us. Please allow 3 business days for your refill to be completed.          Additional Information About Your Visit        MyChart Information     [employer] gives you secure access to your electronic health record. If you see a primary care provider, you can also send messages to your care team and make appointments. If you have questions, please call your primary care clinic.  If you do not have a primary care provider, please call [phone] and they will assist you.        Care EveryWhere ID     This is your Care EveryWhere ID. This could be used by other organizations to access your [place] medical records  [id number]        Your Vitals Were     Pulse Temperature Height Last Period Pulse Oximetry BMI (Body Mass Index)    67 98.4  F (36.9  C) (Oral) 5' 4\" (1.626 m) 09/16/2017 97% 38.62 kg/m2       Blood Pressure from Last 3 Encounters:   10/17/17 102/70   08/03/17 107/67   07/25/17 108/68    Weight from Last 3 " Encounters:   10/17/17 225 lb (102.1 kg)   08/03/17 217 lb (98.4 kg)   07/25/17 217 lb 8 oz (98.7 kg)              Today, you had the following     No orders found for display         Today's Medication Changes          These changes are accurate as of: 10/17/17 11:17 AM.  If you have any questions, ask your nurse or doctor.               Start taking these medicines.        Dose/Directions    citalopram 20 MG tablet   Commonly known as:  celeXA   Used for:  Anxiety   Started by:  Edda Pugh MD        Take 1/2 tablet (10 mg) for 1-2 weeks, then increase to 1 tablet orally daily   Quantity:  30 tablet   Refills:  0            Where to get your medicines      These medications were sent to [facility]/pharmacy #[id number], [place] - [address] [address]     Phone:  [phone]     citalopram 20 MG tablet                Primary Care Provider Office Phone # Fax #    Susanne Morales [phone] [address] [facility] [facility] [address]        Goals        General    Psychosocial (pt-stated)     Notes - Note edited  9/20/2016  1:05 PM by Shahrzad Hernandez BSW    As of today's date 9/20/2016 goal is met at 26 - 50%.   Goal Status:  Discontinued   SW unable to contact pt X3  As of today's date 6/23/2016 goal is met at 26 - 50%.   Goal Status:  Active   Pt's mother is continuing to work with [facility] [employer] to get her 2 year old son onto Medical Assistance                                                                                  BOLA Gauthier          Equal Access to Services     [facility]: Hadii aad ku hadasho Soomaali, waaxda luqadaha, qaybta kaalmada adeegyatone, waxay idiin hayaan adeeg kharash la'aan . So [facility] [place] [phone].    ATENCIÓN: Si habla español, tiene a deal disposición servicios gratuitos de asistencia lingüística. Llame al [phone].    We comply with applicable federal civil rights laws and [place] laws. We do not discriminate  on the basis of race, color, national origin, age, disability, sex, sexual orientation, or gender identity.            Thank you!     Thank you for choosing [facility]  for your care. Our goal is always to provide you with excellent care. Hearing back from our patients is one way we can continue to improve our services. Please take a few minutes to complete the written survey that you may receive in the mail after your visit with us. Thank you!             Your Updated Medication List - Protect others around you: Learn how to safely use, store and throw away your medicines at www.disposemymeds.org.          This list is accurate as of: 10/17/17 11:17 AM.  Always use your most recent med list.                   Brand Name Dispense Instructions for use Diagnosis    * albuterol 108 (90 BASE) MCG/ACT Inhaler    PROAIR HFA/PROVENTIL HFA/VENTOLIN HFA    1 Inhaler    Inhale 2 puffs into the lungs every 4 hours as needed for shortness of breath / dyspnea or wheezing    Intermittent asthma, uncomplicated       * albuterol (2.5 MG/3ML) 0.083% neb solution     25 vial    Take 1 vial (2.5 mg) by nebulization every 6 hours as needed for shortness of breath / dyspnea or wheezing    Mild persistent asthma with acute exacerbation       citalopram 20 MG tablet    celeXA    30 tablet    Take 1/2 tablet (10 mg) for 1-2 weeks, then increase to 1 tablet orally daily    Anxiety       medroxyPROGESTERone 150 MG/ML injection    DEPO-PROVERA    1 mL    Inject 1 mL (150 mg) into the muscle every 3 months    Encounter for initial prescription of injectable contraceptive       TYLENOL PO      Take by mouth as needed for mild pain or fever        * Notice:  This list has 2 medication(s) that are the same as other medications prescribed for you. Read the directions carefully, and ask your doctor or other care provider to review them with you.

## 2017-10-17 NOTE — PROGRESS NOTES
Injectable Influenza Immunization Documentation    1.  Is the person to be vaccinated sick today?   No    2. Does the person to be vaccinated have an allergy to a component   of the vaccine?   No    3. Has the person to be vaccinated ever had a serious reaction   to influenza vaccine in the past?   No    4. Has the person to be vaccinated ever had Guillain-Barré syndrome?   No    Form completed by Ryan Webber. MA

## 2017-10-17 NOTE — NURSING NOTE
"Chief Complaint   Patient presents with     Anxiety     Flu Shot       Initial /70 (BP Location: Left arm, Patient Position: Chair, Cuff Size: Adult Regular)  Pulse 67  Temp 98.4  F (36.9  C) (Oral)  Ht 5' 4\" (1.626 m)  Wt 225 lb (102.1 kg)  LMP 09/16/2017  SpO2 97%  BMI 38.62 kg/m2 Estimated body mass index is 38.62 kg/(m^2) as calculated from the following:    Height as of this encounter: 5' 4\" (1.626 m).    Weight as of this encounter: 225 lb (102.1 kg).  Medication Reconciliation: complete    "

## 2017-10-18 ASSESSMENT — ASTHMA QUESTIONNAIRES: ACT_TOTALSCORE: 21

## 2017-10-18 ASSESSMENT — ANXIETY QUESTIONNAIRES: GAD7 TOTAL SCORE: 10

## 2017-11-14 ENCOUNTER — OFFICE VISIT (OUTPATIENT)
Dept: FAMILY MEDICINE | Facility: CLINIC | Age: 27
End: 2017-11-14
Payer: COMMERCIAL

## 2017-11-14 VITALS
TEMPERATURE: 98.6 F | BODY MASS INDEX: 38.93 KG/M2 | SYSTOLIC BLOOD PRESSURE: 120 MMHG | DIASTOLIC BLOOD PRESSURE: 50 MMHG | OXYGEN SATURATION: 97 % | HEART RATE: 94 BPM | WEIGHT: 228 LBS | HEIGHT: 64 IN | RESPIRATION RATE: 24 BRPM

## 2017-11-14 DIAGNOSIS — F41.9 ANXIETY: ICD-10-CM

## 2017-11-14 DIAGNOSIS — J45.20 INTERMITTENT ASTHMA, UNCOMPLICATED: Primary | ICD-10-CM

## 2017-11-14 PROCEDURE — 99213 OFFICE O/P EST LOW 20 MIN: CPT | Performed by: FAMILY MEDICINE

## 2017-11-14 RX ORDER — CITALOPRAM HYDROBROMIDE 20 MG/1
TABLET ORAL
Qty: 30 TABLET | Refills: 6 | Status: SHIPPED | OUTPATIENT
Start: 2017-11-14 | End: 2018-03-28 | Stop reason: DRUGHIGH

## 2017-11-14 ASSESSMENT — PATIENT HEALTH QUESTIONNAIRE - PHQ9
SUM OF ALL RESPONSES TO PHQ QUESTIONS 1-9: 4
5. POOR APPETITE OR OVEREATING: SEVERAL DAYS

## 2017-11-14 ASSESSMENT — PAIN SCALES - GENERAL: PAINLEVEL: NO PAIN (0)

## 2017-11-14 ASSESSMENT — ANXIETY QUESTIONNAIRES
7. FEELING AFRAID AS IF SOMETHING AWFUL MIGHT HAPPEN: NOT AT ALL
1. FEELING NERVOUS, ANXIOUS, OR ON EDGE: NOT AT ALL
5. BEING SO RESTLESS THAT IT IS HARD TO SIT STILL: NOT AT ALL
3. WORRYING TOO MUCH ABOUT DIFFERENT THINGS: NOT AT ALL
GAD7 TOTAL SCORE: 2
6. BECOMING EASILY ANNOYED OR IRRITABLE: SEVERAL DAYS
2. NOT BEING ABLE TO STOP OR CONTROL WORRYING: NOT AT ALL

## 2017-11-14 NOTE — PATIENT INSTRUCTIONS
[facility] ([facility]    If you have any questions regarding to your visit please contact your care team:       Team Red:   Clinic Hours Telephone Number   Dr. Susanne Canales, NP   7am-7pm  Monday - Thursday   7am-5pm  Fridays  [phone]  (Appointment scheduling available 24/7)    Questions about your visit?   Team Line  [phone]   Urgent Care - [place] - 11am-9pm Monday-Friday Saturday-Sunday- 9am-5pm   [place] - 5pm-9pm Monday-Friday Saturday-Sunday- 9am-5pm  [phone] - [place]   [phone] - [place]       What options do I have for visits at the clinic other than the traditional office visit?  To expand how we care for you, many of our providers are utilizing electronic visits (e-visits) and telephone visits, when medically appropriate, for interactions with their patients rather than a visit in the clinic.   We also offer nurse visits for many medical concerns. Just like any other service, we will bill your insurance company for this type of visit based on time spent on the phone with your provider. Not all insurance companies cover these visits. Please check with your medical insurance if this type of visit is covered. You will be responsible for any charges that are not paid by your insurance.      E-visits via [employer]:  generally incur a $35.00 fee.  Telephone visits:  Time spent on the phone: *charged based on time that is spent on the phone in increments of 10 minutes. Estimated cost:   5-10 mins $30.00   11-20 mins. $59.00   21-30 mins. $85.00     Use [employer] (secure email communication and access to your chart) to send your primary care provider a message or make an appointment. Ask someone on your Team how to sign up for [employer].  For a Price Quote for your services, please call our Consumer Price Line at [phone].      As always, Thank you for trusting us with your health care needs!  Yeimy SIMS  MA

## 2017-11-14 NOTE — PROGRESS NOTES
SUBJECTIVE:                                                    Gloria Sun is a 27 year old female who presents to clinic today for the following health issues:      Anxiety Follow-Up    Status since last visit: stable     Other associated symptoms:None    Complicating factors:   Significant life event: No   Current substance abuse: None  Depression symptoms: No  PORTER-7 SCORE 7/25/2017 10/17/2017 11/14/2017   Total Score - - -   Total Score 1 10 2       GAD7          Asthma Follow-Up    Was ACT completed today?    Yes    ACT Total Scores 11/14/2017   ACT TOTAL SCORE -   ASTHMA ER VISITS -   ASTHMA HOSPITALIZATIONS -   ACT TOTAL SCORE (Goal Greater than or Equal to 20) 22   In the past 12 months, how many times did you visit the emergency room for your asthma without being admitted to the hospital? 0   In the past 12 months, how many times were you hospitalized overnight because of your asthma? 0       Recent asthma triggers that patient is dealing with: upper respiratory infections and exercise or sports            Amount of exercise or physical activity: yes    Problems taking medications regularly: No    Medication side effects: none  Diet: watching diet          Problem list and histories reviewed & adjusted, as indicated.  Additional history: as documented    Patient Active Problem List   Diagnosis     CARDIOVASCULAR SCREENING; LDL GOAL LESS THAN 160     Chronic jaw pain     Status post gastric banding     Gilbert's syndrome     Generalized hyperhidrosis     Obesity     Intermittent asthma, uncomplicated     Health Care Home     Post concussion syndrome     Need for Tdap vaccination     Encounter for supervision of other normal pregnancy     Rh negative state in antepartum period     ASCUS with positive high risk HPV cervical     Abnormal glucose tolerance test-No show and Cancel Diab Ed x 2     Past Surgical History:   Procedure Laterality Date     ESOPHAGOSCOPY, GASTROSCOPY, DUODENOSCOPY (EGD), COMBINED  Left 12/31/2014    Procedure: COMBINED ESOPHAGOSCOPY, GASTROSCOPY, DUODENOSCOPY (EGD), BIOPSY SINGLE OR MULTIPLE;  Surgeon: Ilan Marrero MD;  Location: [facility]     LAPAROSCOPIC CHOLECYSTECTOMY  2/21/2014    Procedure: LAPAROSCOPIC CHOLECYSTECTOMY;  Laparoscopic Cholecystectomy;  Surgeon: Ilan Marrero MD;  Location: [facility] OR     LAPAROSCOPIC GASTRIC SLEEVE  1/21/2013    Procedure: LAPAROSCOPIC GASTRIC SLEEVE;  Laparoscopic Sleeve Gastrectomy;  Surgeon: Chato Mathews MD;  Location: [facility] OR     MOUTH SURGERY  2009       Social History   Substance Use Topics     Smoking status: Never Smoker     Smokeless tobacco: Never Used     Alcohol use No     Family History   Problem Relation Age of Onset     Lipids Mother      Anxiety Disorder Mother      Depression Mother      Hypertension Mother      Obesity Mother      CANCER Father      skin     DIABETES Father      Hypertension Father      Obesity Father      Hypertension Maternal Grandmother      C.A.D. Maternal Grandmother      CABG     Lipids Maternal Grandmother      Depression Maternal Grandmother      Obesity Maternal Grandmother      C.A.D. Maternal Grandfather      CABG, MI      Asthma Maternal Grandfather      CANCER Maternal Grandfather      Respiratory Maternal Grandfather      Lipids Maternal Grandfather      Hypertension Maternal Grandfather      Alzheimer Disease Maternal Grandfather      Depression Maternal Grandfather      Obesity Maternal Grandfather      CEREBROVASCULAR DISEASE Paternal Grandmother      Arthritis Paternal Grandmother      d. lupus     Obesity Paternal Grandmother      HEART DISEASE Paternal Grandfather      Lipids Paternal Grandfather      Thyroid Disease No family hx of      Glaucoma No family hx of      Macular Degeneration No family hx of          Current Outpatient Prescriptions   Medication Sig Dispense Refill     citalopram (CELEXA) 20 MG tablet 1 tablet orally daily 30 tablet 6     medroxyPROGESTERone (DEPO-PROVERA)  "150 MG/ML injection Inject 1 mL (150 mg) into the muscle every 3 months 1 mL 4     Acetaminophen (TYLENOL PO) Take by mouth as needed for mild pain or fever       albuterol (2.5 MG/3ML) 0.083% neb solution Take 1 vial (2.5 mg) by nebulization every 6 hours as needed for shortness of breath / dyspnea or wheezing 25 vial 0     albuterol (PROAIR HFA, PROVENTIL HFA, VENTOLIN HFA) 108 (90 BASE) MCG/ACT inhaler Inhale 2 puffs into the lungs every 4 hours as needed for shortness of breath / dyspnea or wheezing 1 Inhaler 2     [DISCONTINUED] citalopram (CELEXA) 20 MG tablet Take 1/2 tablet (10 mg) for 1-2 weeks, then increase to 1 tablet orally daily 30 tablet 0     Allergies   Allergen Reactions     Codeine Nausea and Vomiting     Desogen [Apri]      Hot flashes     Desogestrel-Ethinyl Estradiol Nausea     Dust Mites      Fluoxetine      Irritable, easy bruising     Magnesium Sulfate Injection      Burning, hotflashes     Medroxyprogesterone Unknown     Swelling at injection site     Morphine      \"Weight loss surgery so it burns the inside of my stomach\"     Venlafaxine      Agitation, anxiety      Recent Labs   Lab Test  03/28/16   1045  11/04/15   1324  12/29/14   1218  02/25/14   1842  02/20/14   1742   11/18/13   1136  07/18/13   1507   02/27/13   1102   01/13/10   1135   A1C   --    --   5.1   --   5.4   --    --   4.7   < >   --    --    --    LDL   --    --   68   --   133*   --   117   --    < >   --    < >   --    HDL   --    --   32*   --   41*   --   45*   --    < >   --    < >   --    TRIG   --    --   103   --   167*   --   174*   --    < >   --    < >   --    ALT   --   16  165*  55*  144*   --   18  21   < >   --    < >   --    CR  0.71  0.70   --   0.82  0.87   < >  0.49*  0.49*   < >   --    < >   --    GFRESTIMATED  >90  Non  GFR Calc    >90  Non  GFR Calc     --   87  81   < >  >90  >90   < >   --    < >   --    GFRESTBLACK  >90   GFR Calc    >90   " "American GFR Calc     --   >90  >90   < >  >90  >90   < >   --    < >   --    POTASSIUM  3.8  3.9   --   4.0  3.2*   < >  3.7  3.7   < >   --    < >   --    TSH   --    --    --    --    --    --    --    --    --   1.11   --   1.71    < > = values in this interval not displayed.      BP Readings from Last 3 Encounters:   11/14/17 120/50   10/17/17 102/70   08/03/17 107/67    Wt Readings from Last 3 Encounters:   11/14/17 228 lb (103.4 kg)   10/17/17 225 lb (102.1 kg)   08/03/17 217 lb (98.4 kg)                  Labs reviewed in EPIC          ROS:  C: NEGATIVE for fever, chills, change in weight  E/M: NEGATIVE for ear, mouth and throat problems  R: NEGATIVE for significant cough or SOB  CV: NEGATIVE for chest pain, palpitations or peripheral edema  GI: NEGATIVE for nausea, abdominal pain, heartburn, or change in bowel habits  MUSCULOSKELETAL: NEGATIVE for significant arthralgias or myalgia    OBJECTIVE:     /50  Pulse 94  Temp 98.6  F (37  C) (Oral)  Resp 24  Ht 5' 4\" (1.626 m)  Wt 228 lb (103.4 kg)  LMP 09/02/2017  SpO2 97%  Breastfeeding? No  BMI 39.14 kg/m2  Body mass index is 39.14 kg/(m^2).  GENERAL: healthy, alert and no distress  NECK: no adenopathy, no asymmetry, masses, or scars and thyroid normal to palpation  RESP: lungs clear to auscultation - no rales, rhonchi or wheezes  CV: regular rate and rhythm, normal S1 S2, no S3 or S4, no murmur, click or rub, no peripheral edema and peripheral pulses strong  ABDOMEN: soft, nontender, no hepatosplenomegaly, no masses and bowel sounds normal  MS: no gross musculoskeletal defects noted, no edema    Diagnostic Test Results:  none     ASSESSMENT/PLAN:         BMI:   Estimated body mass index is 39.14 kg/(m^2) as calculated from the following:    Height as of this encounter: 5' 4\" (1.626 m).    Weight as of this encounter: 228 lb (103.4 kg).   Weight management plan: low digna diet/Exercise        1. Anxiety  Stable   - citalopram (CELEXA) 20 MG tablet; " 1 tablet orally daily  Dispense: 30 tablet; Refill: 6    2. Intermittent asthma, uncomplicated  Controlled  Follow up 6 months    Edda Pugh MD  [facility]

## 2017-11-14 NOTE — MR AVS SNAPSHOT
After Visit Summary   11/14/2017    Gloria Sun    MRN: [id number]           Patient Information     Date Of Birth          1990        Visit Information        Provider Department      11/14/2017 11:50 AM Edda Pugh MD [facility]        Today's Diagnoses     Anxiety          Care Instructions    [facility]    If you have any questions regarding to your visit please contact your care team:       Team Red:   Clinic Hours Telephone Number   Dr. Susanne Canales NP   7am-7pm  Monday - Thursday   7am-5pm  Fridays  [phone]  (Appointment scheduling available 24/7)    Questions about your visit?   Team Line  [phone]   [facility] [place] [facility] 11am-9pm Monday-Friday Saturday-Sunday- 9am-5pm   [place] - 5pm-9pm Monday-Friday Saturday-Sunday- 9am-5pm  [phone] - [place]   [phone] - [place]       What options do I have for visits at the clinic other than the traditional office visit?  To expand how we care for you, many of our providers are utilizing electronic visits (e-visits) and telephone visits, when medically appropriate, for interactions with their patients rather than a visit in the clinic.   We also offer nurse visits for many medical concerns. Just like any other service, we will bill your insurance company for this type of visit based on time spent on the phone with your provider. Not all insurance companies cover these visits. Please check with your medical insurance if this type of visit is covered. You will be responsible for any charges that are not paid by your insurance.      E-visits via Cellwitch:  generally incur a $35.00 fee.  Telephone visits:  Time spent on the phone: *charged based on time that is spent on the phone in increments of 10 minutes. Estimated cost:   5-10 mins $30.00   11-20 mins. $59.00   21-30 mins. $85.00     Use Cellwitch (secure email  "communication and access to your chart) to send your primary care provider a message or make an appointment. Ask someone on your Team how to sign up for [employer].  For a Price Quote for your services, please call our [employer] Line at [phone].      As always, Thank you for trusting us with your health care needs!  Yeimy SIMS MA            Follow-ups after your visit        Who to contact     If you have questions or need follow up information about today's clinic visit or your schedule please contact [facility] [place] directly at [phone].  Normal or non-critical lab and imaging results will be communicated to you by MyChart, letter or phone within 4 business days after the clinic has received the results. If you do not hear from us within 7 days, please contact the clinic through [employer] or phone. If you have a critical or abnormal lab result, we will notify you by phone as soon as possible.  Submit refill requests through [employer] or call your pharmacy and they will forward the refill request to us. Please allow 3 business days for your refill to be completed.          Additional Information About Your Visit        Strevushart Information     [employer] gives you secure access to your electronic health record. If you see a primary care provider, you can also send messages to your care team and make appointments. If you have questions, please call your primary care clinic.  If you do not have a primary care provider, please call [phone] and they will assist you.        Care EveryWhere ID     This is your Care EveryWhere ID. This could be used by other organizations to access your [place] medical records  [id number]        Your Vitals Were     Pulse Temperature Respirations Height Last Period Pulse Oximetry    94 98.6  F (37  C) (Oral) 24 5' 4\" (1.626 m) 09/02/2017 97%    Breastfeeding? BMI (Body Mass Index)                No 39.14 kg/m2           Blood Pressure from Last 3 Encounters:   11/14/17 " 120/50   10/17/17 102/70   08/03/17 107/67    Weight from Last 3 Encounters:   11/14/17 228 lb (103.4 kg)   10/17/17 225 lb (102.1 kg)   08/03/17 217 lb (98.4 kg)              Today, you had the following     No orders found for display         Today's Medication Changes          These changes are accurate as of: 11/14/17 12:09 PM.  If you have any questions, ask your nurse or doctor.               These medicines have changed or have updated prescriptions.        Dose/Directions    citalopram 20 MG tablet   Commonly known as:  celeXA   This may have changed:  additional instructions   Used for:  Anxiety   Changed by:  Edda Pugh MD        1 tablet orally daily   Quantity:  30 tablet   Refills:  6            Where to get your medicines      These medications were sent to [facility]/pharmacy #[id number] [address] [address]     Phone:  [phone]     citalopram 20 MG tablet                Primary Care Provider    Physician No Ref-Primary       NO REF-PRIMARY PHYSICIAN        Goals        General    Psychosocial (pt-stated)     Notes - Note edited  9/20/2016  1:05 PM by Shahrzad Hernandez, BSW    As of today's date 9/20/2016 goal is met at 26 - 50%.   Goal Status:  Discontinued   SW unable to contact pt X3  As of today's date 6/23/2016 goal is met at 26 - 50%.   Goal Status:  Active   Pt's mother is continuing to work with county financial worker to get her 2 year old son onto Medical Assistance                                                                                  DEVORA GauthierW          Equal Access to Services     [facility] AH: Hadii aad ku hadasho Soomaali, waaxda luqadaha, qaybta kaalmada adeegyada, rickey salmon . So [facility] [phone].    ATENCIÓN: Si habla español, tiene a deal disposición servicios gratuitos de asistencia lingüística. Llame al [phone].    We comply with applicable federal civil rights laws and [place]  laws. We do not discriminate on the basis of race, color, national origin, age, disability, sex, sexual orientation, or gender identity.            Thank you!     Thank you for choosing [facility] [place]  for your care. Our goal is always to provide you with excellent care. Hearing back from our patients is one way we can continue to improve our services. Please take a few minutes to complete the written survey that you may receive in the mail after your visit with us. Thank you!             Your Updated Medication List - Protect others around you: Learn how to safely use, store and throw away your medicines at www.disposemymeds.org.          This list is accurate as of: 11/14/17 12:09 PM.  Always use your most recent med list.                   Brand Name Dispense Instructions for use Diagnosis    * albuterol 108 (90 BASE) MCG/ACT Inhaler    PROAIR HFA/PROVENTIL HFA/VENTOLIN HFA    1 Inhaler    Inhale 2 puffs into the lungs every 4 hours as needed for shortness of breath / dyspnea or wheezing    Intermittent asthma, uncomplicated       * albuterol (2.5 MG/3ML) 0.083% neb solution     25 vial    Take 1 vial (2.5 mg) by nebulization every 6 hours as needed for shortness of breath / dyspnea or wheezing    Mild persistent asthma with acute exacerbation       citalopram 20 MG tablet    celeXA    30 tablet    1 tablet orally daily    Anxiety       medroxyPROGESTERone 150 MG/ML injection    DEPO-PROVERA    1 mL    Inject 1 mL (150 mg) into the muscle every 3 months    Encounter for initial prescription of injectable contraceptive       TYLENOL PO      Take by mouth as needed for mild pain or fever        * Notice:  This list has 2 medication(s) that are the same as other medications prescribed for you. Read the directions carefully, and ask your doctor or other care provider to review them with you.

## 2017-11-14 NOTE — LETTER
My Asthma Action Plan  Name: Gloria Sun   YOB: 1990  Date: 11/14/2017   My doctor: Edda Pugh MD   My clinic: [facility]        My Control Medicine: None  My Rescue Medicine: Albuterol nebulizer solution seee med sheet  Albuterol (Proair/Ventolin/Proventil) inhaler see med sheet   My Asthma Severity: intermittent  Avoid your asthma triggers: upper respiratory infections and exercise or sports               GREEN ZONE   Good Control    I feel good    No cough or wheeze    Can work, sleep and play without asthma symptoms       Take your asthma control medicine every day.     1. If exercise triggers your asthma, take your rescue medication    15 minutes before exercise or sports, and    During exercise if you have asthma symptoms  2. Spacer to use with inhaler: If you have a spacer, make sure to use it with your inhaler             YELLOW ZONE Getting Worse  I have ANY of these:    I do not feel good    Cough or wheeze    Chest feels tight    Wake up at night   1. Keep taking your Green Zone medications  2. Start taking your rescue medicine:    every 20 minutes for up to 1 hour. Then every 4 hours for 24-48 hours.  3. If you stay in the Yellow Zone for more than 12-24 hours, contact your doctor.  4. If you do not return to the Green Zone in 12-24 hours or you get worse, start taking your oral steroid medicine if prescribed by your provider.           RED ZONE Medical Alert - Get Help  I have ANY of these:    I feel awful    Medicine is not helping    Breathing getting harder    Trouble walking or talking    Nose opens wide to breathe       1. Take your rescue medicine NOW  2. If your provider has prescribed an oral steroid medicine, start taking it NOW  3. Call your doctor NOW  4. If you are still in the Red Zone after 20 minutes and you have not reached your doctor:    Take your rescue medicine again and    Call 911 or go to the emergency room right away    See your regular doctor  within 2 weeks of an Emergency Room or Urgent Care visit for follow-up treatment.        Electronically signed by: Edda Pugh, November 14, 2017    Annual Reminders:  Meet with Asthma Educator,  Flu Shot in the Fall, consider Pneumonia Vaccination for patients with asthma (aged 19 and older).    Pharmacy: [facility]/PHARMACY #[id number] - [place], [place] - [address]                    Asthma Triggers  How To Control Things That Make Your Asthma Worse    Triggers are things that make your asthma worse.  Look at the list below to help you find your triggers and what you can do about them.  You can help prevent asthma flare-ups by staying away from your triggers.      Trigger                                                          What you can do   Cigarette Smoke  Tobacco smoke can make asthma worse. Do not allow smoking in your home, car or around you.  Be sure no one smokes at a child s day care or school.  If you smoke, ask your health care provider for ways to help you quit.  Ask family members to quit too.  Ask your health care provider for a referral to Quit Plan to help you quit smoking, or call 7-444-561-PLAN.     Colds, Flu, Bronchitis  These are common triggers of asthma. Wash your hands often.  Don t touch your eyes, nose or mouth.  Get a flu shot every year.     Dust Mites  These are tiny bugs that live in cloth or carpet. They are too small to see. Wash sheets and blankets in hot water every week.   Encase pillows and mattress in dust mite proof covers.  Avoid having carpet if you can. If you have carpet, vacuum weekly.   Use a dust mask and HEPA vacuum.   Pollen and Outdoor Mold  Some people are allergic to trees, grass, or weed pollen, or molds. Try to keep your windows closed.  Limit time out doors when pollen count is high.   Ask you health care provider about taking medicine during allergy season.     Animal Dander  Some people are allergic to skin flakes, urine or saliva from pets with fur or  feathers. Keep pets with fur or feathers out of your home.    If you can t keep the pet outdoors, then keep the pet out of your bedroom.  Keep the bedroom door closed.  Keep pets off cloth furniture and away from stuffed toys.     Mice, Rats, and Cockroaches  Some people are allergic to the waste from these pests.   Cover food and garbage.  Clean up spills and food crumbs.  Store grease in the refrigerator.   Keep food out of the bedroom.   Indoor Mold  This can be a trigger if your home has high moisture. Fix leaking faucets, pipes, or other sources of water.   Clean moldy surfaces.  Dehumidify basement if it is damp and smelly.   Smoke, Strong Odors, and Sprays  These can reduce air quality. Stay away from strong odors and sprays, such as perfume, powder, hair spray, paints, smoke incense, paint, cleaning products, candles and new carpet.   Exercise or Sports  Some people with asthma have this trigger. Be active!  Ask your doctor about taking medicine before sports or exercise to prevent symptoms.    Warm up for 5-10 minutes before and after sports or exercise.     Other Triggers of Asthma  Cold air:  Cover your nose and mouth with a scarf.  Sometimes laughing or crying can be a trigger.  Some medicines and food can trigger asthma.

## 2017-11-14 NOTE — NURSING NOTE
"Chief Complaint   Patient presents with     Anxiety       Initial /50  Pulse 94  Temp 98.6  F (37  C) (Oral)  Resp 24  Ht 5' 4\" (1.626 m)  Wt 228 lb (103.4 kg)  LMP 09/02/2017  SpO2 97%  Breastfeeding? No  BMI 39.14 kg/m2 Estimated body mass index is 39.14 kg/(m^2) as calculated from the following:    Height as of this encounter: 5' 4\" (1.626 m).    Weight as of this encounter: 228 lb (103.4 kg).  Medication Reconciliation: complete   Yeimy SIMS MA      "

## 2017-11-15 ASSESSMENT — ANXIETY QUESTIONNAIRES: GAD7 TOTAL SCORE: 2

## 2017-11-15 ASSESSMENT — ASTHMA QUESTIONNAIRES: ACT_TOTALSCORE: 22

## 2017-12-11 ENCOUNTER — ALLIED HEALTH/NURSE VISIT (OUTPATIENT)
Dept: NURSING | Facility: CLINIC | Age: 27
End: 2017-12-11
Payer: COMMERCIAL

## 2017-12-11 VITALS — DIASTOLIC BLOOD PRESSURE: 64 MMHG | SYSTOLIC BLOOD PRESSURE: 110 MMHG

## 2017-12-11 PROCEDURE — 96372 THER/PROPH/DIAG INJ SC/IM: CPT

## 2017-12-11 NOTE — MR AVS SNAPSHOT
After Visit Summary   12/11/2017    Gloria Sun    MRN: [id number]           Patient Information     Date Of Birth          1990        Visit Information        Provider Department      12/11/2017 10:15 AM [facility]CILL[facility]        Today's Diagnoses     Contraception    -  1       Follow-ups after your visit        Your next 10 appointments already scheduled     Dec 11, 2017 10:15 AM CST   Nurse Only with  [facility]   [facility]    [address] [id number] [phone]              Who to contact     If you have questions or need follow up information about today's clinic visit or your schedule please contact [facility] directly at [phone].  Normal or non-critical lab and imaging results will be communicated to you by MyChart, letter or phone within 4 business days after the clinic has received the results. If you do not hear from us within 7 days, please contact the clinic through MyChart or phone. If you have a critical or abnormal lab result, we will notify you by phone as soon as possible.  Submit refill requests through [email or web address] or call your pharmacy and they will forward the refill request to us. Please allow 3 business days for your refill to be completed.          Additional Information About Your Visit        MyChart Information     [email or web address] gives you secure access to your electronic health record. If you see a primary care provider, you can also send messages to your care team and make appointments. If you have questions, please call your primary care clinic.  If you do not have a primary care provider, please call [phone] and they will assist you.        Care EveryWhere ID     This is your Care EveryWhere ID. This could be used by other organizations to access your [place] medical records  [id number]        Your Vitals Were     Last Period                   09/02/2017             Blood Pressure from Last 3 Encounters:   12/11/17 110/64   11/14/17 120/50   10/17/17 102/70    Weight from Last 3 Encounters:   11/14/17 228 lb (103.4 kg)   10/17/17 225 lb (102.1 kg)   08/03/17 217 lb (98.4 kg)              We Performed the Following     INJECTION INTRAMUSCULAR OR SUB-Q     Medroxyprogesterone inj  1mg   (Depo Provera J-Code)        Primary Care Provider Fax #    Physician No Ref-Primary [phone]       No address on file        Goals        General    Psychosocial (pt-stated)     Notes - Note edited  9/20/2016  1:05 PM by Shahrzad Hernandez BSW    As of today's date 9/20/2016 goal is met at 26 - 50%.   Goal Status:  Discontinued   SW unable to contact pt X3  As of today's date 6/23/2016 goal is met at 26 - 50%.   Goal Status:  Active   Pt's mother is continuing to work with county financial worker to get her 2 year old son onto Medical Assistance                                                                                  BOLA Gauthier          Equal Access to Services     [facility] AH: Hadii aad ku hadasho Soomaali, waaxda luqadaha, qaybta kaalmada adeegyada, rickey salmon . So [facility] [phone].    ATENCIÓN: Si habla español, tiene a deal disposición servicios gratuitos de asistencia lingüística. Llame al [phone].    We comply with applicable federal civil rights laws and [place] laws. We do not discriminate on the basis of race, color, national origin, age, disability, sex, sexual orientation, or gender identity.            Thank you!     Thank you for choosing [facility] [place]  for your care. Our goal is always to provide you with excellent care. Hearing back from our patients is one way we can continue to improve our services. Please take a few minutes to complete the written survey that you may receive in the mail after your visit with us. Thank you!             Your Updated Medication List - Protect others around you: Learn how to safely  use, store and throw away your medicines at www.disposemymeds.org.          This list is accurate as of: 12/11/17 10:14 AM.  Always use your most recent med list.                   Brand Name Dispense Instructions for use Diagnosis    * albuterol 108 (90 BASE) MCG/ACT Inhaler    PROAIR HFA/PROVENTIL HFA/VENTOLIN HFA    1 Inhaler    Inhale 2 puffs into the lungs every 4 hours as needed for shortness of breath / dyspnea or wheezing    Intermittent asthma, uncomplicated       * albuterol (2.5 MG/3ML) 0.083% neb solution     25 vial    Take 1 vial (2.5 mg) by nebulization every 6 hours as needed for shortness of breath / dyspnea or wheezing    Mild persistent asthma with acute exacerbation       citalopram 20 MG tablet    celeXA    30 tablet    1 tablet orally daily    Anxiety       medroxyPROGESTERone 150 MG/ML injection    DEPO-PROVERA    1 mL    Inject 1 mL (150 mg) into the muscle every 3 months    Encounter for initial prescription of injectable contraceptive       TYLENOL PO      Take by mouth as needed for mild pain or fever        * Notice:  This list has 2 medication(s) that are the same as other medications prescribed for you. Read the directions carefully, and ask your doctor or other care provider to review them with you.

## 2017-12-11 NOTE — PROGRESS NOTES
Follow Up Injection    Patient returning during stated date range given at previous visit: Yes      If here at the correct interval:   BP Readings from Last 1 Encounters:   12/11/17 110/64     Wt Readings from Last 1 Encounters:   11/14/17 228 lb (103.4 kg)       Last Pap/exam date:   Lab Results   Component Value Date    PAP NIL 08/03/2017           Side effects or problems with last injection?  No.  Date range is given to patient for next dose: 2/26/2018-3/12/2018    See Medication Note for administration information    Staff Sig: Tere ARREAGA CMA ([facility])

## 2017-12-11 NOTE — NURSING NOTE
"Chief Complaint   Patient presents with     Contraception     Depo Injection       Initial /64 (Cuff Size: Adult Large)  LMP 09/02/2017 Estimated body mass index is 39.14 kg/(m^2) as calculated from the following:    Height as of 11/14/17: 5' 4\" (1.626 m).    Weight as of 11/14/17: 228 lb (103.4 kg).  Medication Reconciliation: unable or not appropriate to perform   Prior to injection verified patient identity using patient's name and date of birth.  BP: 110/64    The following medication was given:     MEDICATION: Depo Provera 150mg  ROUTE: IM  SITE: Deltoid - Left  : [employer]  LOT #: [id number]  EXP:02/2020  NEXT INJECTION DUE: 2/26/18 - 3/12/18   Provider: Pascale ARREAGA CMA ([facility])    "

## 2017-12-28 DIAGNOSIS — J45.20 INTERMITTENT ASTHMA, UNCOMPLICATED: ICD-10-CM

## 2017-12-29 RX ORDER — ALBUTEROL SULFATE 90 UG/1
AEROSOL, METERED RESPIRATORY (INHALATION)
Qty: 3 INHALER | Refills: 0 | Status: SHIPPED | OUTPATIENT
Start: 2017-12-29 | End: 2018-01-02

## 2017-12-29 NOTE — TELEPHONE ENCOUNTER
Prescription approved per [facility] Refill Protocol.  Emilie Fischer RN

## 2018-01-02 ENCOUNTER — OFFICE VISIT (OUTPATIENT)
Dept: FAMILY MEDICINE | Facility: CLINIC | Age: 28
End: 2018-01-02
Payer: COMMERCIAL

## 2018-01-02 VITALS
WEIGHT: 230 LBS | TEMPERATURE: 96.5 F | BODY MASS INDEX: 39.27 KG/M2 | OXYGEN SATURATION: 97 % | DIASTOLIC BLOOD PRESSURE: 66 MMHG | RESPIRATION RATE: 16 BRPM | HEART RATE: 72 BPM | HEIGHT: 64 IN | SYSTOLIC BLOOD PRESSURE: 108 MMHG

## 2018-01-02 DIAGNOSIS — F41.9 ANXIETY: ICD-10-CM

## 2018-01-02 DIAGNOSIS — F33.1 MODERATE RECURRENT MAJOR DEPRESSION (H): ICD-10-CM

## 2018-01-02 DIAGNOSIS — J45.20 INTERMITTENT ASTHMA, UNCOMPLICATED: ICD-10-CM

## 2018-01-02 DIAGNOSIS — J06.9 VIRAL UPPER RESPIRATORY TRACT INFECTION: Primary | ICD-10-CM

## 2018-01-02 PROCEDURE — 99214 OFFICE O/P EST MOD 30 MIN: CPT | Performed by: FAMILY MEDICINE

## 2018-01-02 RX ORDER — CITALOPRAM HYDROBROMIDE 40 MG/1
40 TABLET ORAL DAILY
Qty: 30 TABLET | Refills: 1 | Status: SHIPPED | OUTPATIENT
Start: 2018-01-02 | End: 2018-03-18

## 2018-01-02 RX ORDER — BENZONATATE 200 MG/1
200 CAPSULE ORAL 3 TIMES DAILY PRN
Qty: 21 CAPSULE | Refills: 0 | Status: SHIPPED | OUTPATIENT
Start: 2018-01-02 | End: 2018-03-28

## 2018-01-02 RX ORDER — TRAZODONE HYDROCHLORIDE 50 MG/1
TABLET, FILM COATED ORAL
Qty: 60 TABLET | Refills: 5 | Status: SHIPPED | OUTPATIENT
Start: 2018-01-02 | End: 2018-07-14

## 2018-01-02 RX ORDER — ALBUTEROL SULFATE 90 UG/1
AEROSOL, METERED RESPIRATORY (INHALATION)
Qty: 3 INHALER | Refills: 3 | Status: SHIPPED | OUTPATIENT
Start: 2018-01-02 | End: 2018-09-27

## 2018-01-02 RX ORDER — CITALOPRAM HYDROBROMIDE 20 MG/1
TABLET ORAL
Qty: 30 TABLET | Refills: 6 | Status: CANCELLED | OUTPATIENT
Start: 2018-01-02

## 2018-01-02 ASSESSMENT — ANXIETY QUESTIONNAIRES
5. BEING SO RESTLESS THAT IT IS HARD TO SIT STILL: NOT AT ALL
2. NOT BEING ABLE TO STOP OR CONTROL WORRYING: SEVERAL DAYS
3. WORRYING TOO MUCH ABOUT DIFFERENT THINGS: SEVERAL DAYS
6. BECOMING EASILY ANNOYED OR IRRITABLE: MORE THAN HALF THE DAYS
7. FEELING AFRAID AS IF SOMETHING AWFUL MIGHT HAPPEN: NOT AT ALL
IF YOU CHECKED OFF ANY PROBLEMS ON THIS QUESTIONNAIRE, HOW DIFFICULT HAVE THESE PROBLEMS MADE IT FOR YOU TO DO YOUR WORK, TAKE CARE OF THINGS AT HOME, OR GET ALONG WITH OTHER PEOPLE: SOMEWHAT DIFFICULT
1. FEELING NERVOUS, ANXIOUS, OR ON EDGE: SEVERAL DAYS
GAD7 TOTAL SCORE: 6

## 2018-01-02 ASSESSMENT — PATIENT HEALTH QUESTIONNAIRE - PHQ9
5. POOR APPETITE OR OVEREATING: SEVERAL DAYS
SUM OF ALL RESPONSES TO PHQ QUESTIONS 1-9: 5

## 2018-01-02 NOTE — NURSING NOTE
"Chief Complaint   Patient presents with     Recheck Medication     up dose on citalopram and get back trazodone     Otalgia     both and head pressure       Initial /66  Pulse 72  Temp 96.5  F (35.8  C)  Resp 16  Ht 5' 4\" (1.626 m)  Wt 230 lb (104.3 kg)  SpO2 97%  BMI 39.48 kg/m2 Estimated body mass index is 39.48 kg/(m^2) as calculated from the following:    Height as of this encounter: 5' 4\" (1.626 m).    Weight as of this encounter: 230 lb (104.3 kg).  Medication Reconciliation: complete     Ryan Webber. MA      "

## 2018-01-02 NOTE — PATIENT INSTRUCTIONS
[facility]    If you have any questions regarding to your visit please contact your care team:       Team Red:   Clinic Hours Telephone Number   Dr. Susanne [place]  (pediatrics)  Gail Canales NP 7am-7pm  Monday - Thursday   7am-5pm  Fridays  [phone] (fax)    [place] CHAN  [phone]   Urgent Care - [place] and [place] Monday-Friday  [place] - 11am-8pm  Saturday-Sunday  Both sites - 9am-5pm  [phone] - [facility]  [phone] - [place]       What options do I have for visits at the clinic other than the traditional office visit?  To expand how we care for you, many of our providers are utilizing electronic visits (e-visits) and telephone visits, when medically appropriate, for interactions with their patients rather than a visit in the clinic.   We also offer nurse visits for many medical concerns. Just like any other service, we will bill your insurance company for this type of visit based on time spent on the phone with your provider. Not all insurance companies cover these visits. Please check with your medical insurance if this type of visit is covered. You will be responsible for any charges that are not paid by your insurance.      E-visits via [employer]:  generally incur a $35.00 fee.  Telephone visits:  Time spent on the phone: *charged based on time that is spent on the phone in increments of 10 minutes. Estimated cost:   5-10 mins $30.00   11-20 mins. $59.00   21-30 mins. $85.00     As always, Thank you for trusting us with your health care needs!  Yeimy SIMS MA

## 2018-01-02 NOTE — PROGRESS NOTES
SUBJECTIVE:   Gloria Sun is a 27 year old female who presents to clinic today for the following health issues:      Acute Illness   Acute illness concerns: cough  Onset: 4 day    Fever: YES-    100.1-now afebrile    Chills/Sweats: YES    Headache (location?): YES    Sinus Pressure:YES    Conjunctivitis:  no    Ear Pain: YES: both    Rhinorrhea: YES    Congestion: YES    Sore Throat: no     Cough: YES  Nonprodutive     Wheeze: yes mild    Decreased Appetite: no    Nausea: no    Vomiting: no    Diarrhea:  no    Dysuria/Freq.: no    Fatigue/Achiness: YES    Sick/Strep Exposure: YES     Therapies Tried and outcome: inhale and nyquil        Depression and Anxiety Follow-Up    Status since last visit: anxiety is worse and Pt would like to increase her celexa    Other associated symptoms:None    Complicating factors:     Significant life event: No     Current substance abuse: None    PHQ-9 Score and MyChart F/U Questions 7/25/2017 10/17/2017 11/14/2017   Total Score 2 4 4   Q9: Suicide Ideation Not at all Not at all Not at all     PORTER-7 SCORE 7/25/2017 10/17/2017 11/14/2017   Total Score - - -   Total Score 1 10 2     In the past two weeks have you had thoughts of suicide or self-harm?  No.    Do you have concerns about your personal safety or the safety of others?   No    PHQ-9  English  PHQ-9   Any Language  GAD7  Suicide Assessment Five-step Evaluation and Treatment (SAFE-T)        Problem list and histories reviewed & adjusted, as indicated.  Additional history: as documented    Patient Active Problem List   Diagnosis     CARDIOVASCULAR SCREENING; LDL GOAL LESS THAN 160     Chronic jaw pain     Status post gastric banding     Gilbert's syndrome     Generalized hyperhidrosis     Obesity     Intermittent asthma, uncomplicated     Health Care Home     Post concussion syndrome     Need for Tdap vaccination     Encounter for supervision of other normal pregnancy     Rh negative state in antepartum period     ASCUS  with positive high risk HPV cervical     Abnormal glucose tolerance test-No show and Cancel Diab Ed x 2     Past Surgical History:   Procedure Laterality Date     ESOPHAGOSCOPY, GASTROSCOPY, DUODENOSCOPY (EGD), COMBINED Left 12/31/2014    Procedure: COMBINED ESOPHAGOSCOPY, GASTROSCOPY, DUODENOSCOPY (EGD), BIOPSY SINGLE OR MULTIPLE;  Surgeon: Ilan Marrero MD;  Location: [facility]     LAPAROSCOPIC CHOLECYSTECTOMY  2/21/2014    Procedure: LAPAROSCOPIC CHOLECYSTECTOMY;  Laparoscopic Cholecystectomy;  Surgeon: Ilan Marrero MD;  Location: [facility]     LAPAROSCOPIC GASTRIC SLEEVE  1/21/2013    Procedure: LAPAROSCOPIC GASTRIC SLEEVE;  Laparoscopic Sleeve Gastrectomy;  Surgeon: Chato Mathews MD;  Location: [facility]     MOUTH SURGERY  2009       Social History   Substance Use Topics     Smoking status: Never Smoker     Smokeless tobacco: Never Used     Alcohol use No     Family History   Problem Relation Age of Onset     Lipids Mother      Anxiety Disorder Mother      Depression Mother      Hypertension Mother      Obesity Mother      CANCER Father      skin     DIABETES Father      Hypertension Father      Obesity Father      Hypertension Maternal Grandmother      C.A.D. Maternal Grandmother      CABG     Lipids Maternal Grandmother      Depression Maternal Grandmother      Obesity Maternal Grandmother      C.A.D. Maternal Grandfather      CABG, MI      Asthma Maternal Grandfather      CANCER Maternal Grandfather      Respiratory Maternal Grandfather      Lipids Maternal Grandfather      Hypertension Maternal Grandfather      Alzheimer Disease Maternal Grandfather      Depression Maternal Grandfather      Obesity Maternal Grandfather      CEREBROVASCULAR DISEASE Paternal Grandmother      Arthritis Paternal Grandmother      d. lupus     Obesity Paternal Grandmother      HEART DISEASE Paternal Grandfather      Lipids Paternal Grandfather      Thyroid Disease No family hx of      Glaucoma No family hx of       "Macular Degeneration No family hx of          Current Outpatient Prescriptions   Medication Sig Dispense Refill     albuterol (VENTOLIN HFA) 108 (90 BASE) MCG/ACT Inhaler INHALE 2 PUFFS INTO LUNG EVERY 4 HOURS AS NEEDED FOR SHORTNESS OF BREATH/DYSPNEA/WHEEZING 3 Inhaler 3     traZODone (DESYREL) 50 MG tablet TAKE 1-2 TABLETS BY MOUTH EVERY NIGHT AT BEDTIME AS NEEDED FOR SLEEP 60 tablet 5     citalopram (CELEXA) 40 MG tablet Take 1 tablet (40 mg) by mouth daily 30 tablet 1     benzonatate (TESSALON) 200 MG capsule Take 1 capsule (200 mg) by mouth 3 times daily as needed for cough 21 capsule 0     citalopram (CELEXA) 20 MG tablet 1 tablet orally daily 30 tablet 6     medroxyPROGESTERone (DEPO-PROVERA) 150 MG/ML injection Inject 1 mL (150 mg) into the muscle every 3 months 1 mL 4     Acetaminophen (TYLENOL PO) Take by mouth as needed for mild pain or fever       albuterol (2.5 MG/3ML) 0.083% neb solution Take 1 vial (2.5 mg) by nebulization every 6 hours as needed for shortness of breath / dyspnea or wheezing 25 vial 0     [DISCONTINUED] VENTOLIN  (90 BASE) MCG/ACT Inhaler INHALE 2 PUFFS INTO LUNG EVERY 4 HOURS AS NEEDED FOR SHORTNESS OF BREATH/DYSPNEA/WHEEZING 3 Inhaler 0     Allergies   Allergen Reactions     Codeine Nausea and Vomiting     Desogen [Apri]      Hot flashes     Desogestrel-Ethinyl Estradiol Nausea     Dust Mites      Fluoxetine      Irritable, easy bruising     Magnesium Sulfate Injection      Burning, hotflashes     Medroxyprogesterone Unknown     Swelling at injection site     Morphine      \"Weight loss surgery so it burns the inside of my stomach\"     Venlafaxine      Agitation, anxiety      Recent Labs   Lab Test  03/28/16   1045  11/04/15   1324  12/29/14   1218  02/25/14   1842  02/20/14   1742   11/18/13   1136  07/18/13   1507   02/27/13   1102   01/13/10   1135   A1C   --    --   5.1   --   5.4   --    --   4.7   < >   --    --    --    LDL   --    --   68   --   133*   --   117   --    " "< >   --    < >   --    HDL   --    --   32*   --   41*   --   45*   --    < >   --    < >   --    TRIG   --    --   103   --   167*   --   174*   --    < >   --    < >   --    ALT   --   16  165*  55*  144*   --   18  21   < >   --    < >   --    CR  0.71  0.70   --   0.82  0.87   < >  0.49*  0.49*   < >   --    < >   --    GFRESTIMATED  >90  Non  GFR Calc    >90  Non  GFR Calc     --   87  81   < >  >90  >90   < >   --    < >   --    GFRESTBLACK  >90   GFR Calc    >90   GFR Calc     --   >90  >90   < >  >90  >90   < >   --    < >   --    POTASSIUM  3.8  3.9   --   4.0  3.2*   < >  3.7  3.7   < >   --    < >   --    TSH   --    --    --    --    --    --    --    --    --   1.11   --   1.71    < > = values in this interval not displayed.      BP Readings from Last 3 Encounters:   01/02/18 108/66   12/11/17 110/64   11/14/17 120/50    Wt Readings from Last 3 Encounters:   01/02/18 230 lb (104.3 kg)   11/14/17 228 lb (103.4 kg)   10/17/17 225 lb (102.1 kg)                  Labs reviewed in EPIC          Reviewed and updated as needed this visit by clinical staff     Reviewed and updated as needed this visit by Provider         ROS:  C: NEGATIVE for fever, chills, change in weight  INTEGUMENTARY/SKIN: NEGATIVE for worrisome rashes, moles or lesions  ENT/MOUTH: as above  RESP:cough nonprodutive   CV: NEGATIVE for chest pain, palpitations or peripheral edema  GI: NEGATIVE for nausea, abdominal pain, heartburn, or change in bowel habits  PSYCHIATRIC: NEGATIVE for changes in mood or affect    OBJECTIVE:     /66  Pulse 72  Temp 96.5  F (35.8  C)  Resp 16  Ht 5' 4\" (1.626 m)  Wt 230 lb (104.3 kg)  SpO2 97%  BMI 39.48 kg/m2  Body mass index is 39.48 kg/(m^2).  GENERAL: healthy, alert and no distress  EYES: Eyes grossly normal to inspection, PERRL and conjunctivae and sclerae normal  HENT: ear canals and TM's normal, nose congestion  and mouth " "without ulcers or lesions  NECK: no adenopathy, no asymmetry, masses, or scars and thyroid normal to palpation  RESP: lungs clear to auscultation - no rales, rhonchi or wheezes  CV: regular rate and rhythm, normal S1 S2, no S3 or S4, no murmur, click or rub, no peripheral edema and peripheral pulses strong  ABDOMEN: soft, nontender, no hepatosplenomegaly, no masses and bowel sounds normal  MS: no gross musculoskeletal defects noted, no edema    Diagnostic Test Results:  none     ASSESSMENT/PLAN:         BMI:   Estimated body mass index is 39.48 kg/(m^2) as calculated from the following:    Height as of this encounter: 5' 4\" (1.626 m).    Weight as of this encounter: 230 lb (104.3 kg).   Weight management plalow digna dietlow digna diet        1. Intermittent asthma, uncomplicated  Stable   - albuterol (VENTOLIN HFA) 108 (90 BASE) MCG/ACT Inhaler; INHALE 2 PUFFS INTO LUNG EVERY 4 HOURS AS NEEDED FOR SHORTNESS OF BREATH/DYSPNEA/WHEEZING  Dispense: 3 Inhaler; Refill: 3    2. Moderate recurrent major depression (H)  SEE Psychiatric care orders  The potential side effects of this medication have been discussed with the patient.  Call if any significant problems with these are experienced.  Follow up  1 month  - traZODone (DESYREL) 50 MG tablet; TAKE 1-2 TABLETS BY MOUTH EVERY NIGHT AT BEDTIME AS NEEDED FOR SLEEP  Dispense: 60 tablet; Refill: 5  - citalopram (CELEXA) 40 MG tablet; Take 1 tablet (40 mg) by mouth daily  Dispense: 30 tablet; Refill: 1    3. Anxiety  As above  - citalopram (CELEXA) 40 MG tablet; Take 1 tablet (40 mg) by mouth daily  Dispense: 30 tablet; Refill: 1  Would recheck wt 1 month   May consider switching to wellbutrin  4. Viral upper respiratory tract infection  Advised symptomatic Treatment  Rest/fluids/tylenol  Follow up 1 week if not better/sooner if worse  Use albuterol q2-4 hours PRN  - benzonatate (TESSALON) 200 MG capsule; Take 1 capsule (200 mg) by mouth 3 times daily as needed for cough  Dispense: 21 " capsule; Refill: 0  Edda Pugh MD  [facility]

## 2018-01-02 NOTE — MR AVS SNAPSHOT
After Visit Summary   1/2/2018    Gloria Sun    MRN: [id number]           Patient Information     Date Of Birth          1990        Visit Information        Provider Department      1/2/2018 11:50 AM Edda Pugh MD [facility]        Today's Diagnoses     Viral upper respiratory tract infection    -  1    Intermittent asthma, uncomplicated        Moderate recurrent major depression (H)        Anxiety        Class 2 obesity due to excess calories with serious comorbidity and body mass index (BMI) of 39.0 to 39.9 in adult          Care Instructions    [facility]    If you have any questions regarding to your visit please contact your care team:       Team Red:   Clinic Hours Telephone Number   Dr. Susanne Worley  (pediatrics)  Gail Canales NP 7am-7pm  Monday - Thursday   7am-5pm  Fridays  [phone] (fax)    Ely CHAN  [phone]   Urgent Care - [place] and [place] Monday-Friday  [place] - 11am-8pm  Saturday-Sunday  Both sites - 9am-5pm  [phone] - [facility]  [phone] - [place]       What options do I have for visits at the clinic other than the traditional office visit?  To expand how we care for you, many of our providers are utilizing electronic visits (e-visits) and telephone visits, when medically appropriate, for interactions with their patients rather than a visit in the clinic.   We also offer nurse visits for many medical concerns. Just like any other service, we will bill your insurance company for this type of visit based on time spent on the phone with your provider. Not all insurance companies cover these visits. Please check with your medical insurance if this type of visit is covered. You will be responsible for any charges that are not paid by your insurance.      E-visits via [employer]:  generally incur a $35.00 fee.  Telephone visits:  Time spent on the phone: *charged  "based on time that is spent on the phone in increments of 10 minutes. Estimated cost:   5-10 mins $30.00   11-20 mins. $59.00   21-30 mins. $85.00     As always, Thank you for trusting us with your health care needs!  Yeimy SIMS MA                      Follow-ups after your visit        Who to contact     If you have questions or need follow up information about today's clinic visit or your schedule please contact [facility] directly at [phone].  Normal or non-critical lab and imaging results will be communicated to you by [employer], letter or phone within 4 business days after the clinic has received the results. If you do not hear from us within 7 days, please contact the clinic through [employer] or phone. If you have a critical or abnormal lab result, we will notify you by phone as soon as possible.  Submit refill requests through [employer] or call your pharmacy and they will forward the refill request to us. Please allow 3 business days for your refill to be completed.          Additional Information About Your Visit        [employer] Information     [employer] gives you secure access to your electronic health record. If you see a primary care provider, you can also send messages to your care team and make appointments. If you have questions, please call your primary care clinic.  If you do not have a primary care provider, please call [phone] and they will assist you.        Care EveryWhere ID     This is your Care EveryWhere ID. This could be used by other organizations to access your [facility] medical records  [id number]        Your Vitals Were     Pulse Temperature Respirations Height Pulse Oximetry BMI (Body Mass Index)    72 96.5  F (35.8  C) 16 5' 4\" (1.626 m) 97% 39.48 kg/m2       Blood Pressure from Last 3 Encounters:   01/02/18 108/66   12/11/17 110/64   11/14/17 120/50    Weight from Last 3 Encounters:   01/02/18 230 lb (104.3 kg)   11/14/17 228 lb (103.4 kg)   10/17/17 225 lb (102.1 kg) "              We Performed the Following     DEPRESSION ACTION PLAN (DAP)          Today's Medication Changes          These changes are accurate as of: 1/2/18 12:26 PM.  If you have any questions, ask your nurse or doctor.               Start taking these medicines.        Dose/Directions    benzonatate 200 MG capsule   Commonly known as:  TESSALON   Used for:  Viral upper respiratory tract infection   Started by:  Edda Pugh MD        Dose:  200 mg   Take 1 capsule (200 mg) by mouth 3 times daily as needed for cough   Quantity:  21 capsule   Refills:  0       traZODone 50 MG tablet   Commonly known as:  DESYREL   Used for:  Moderate recurrent major depression (H)   Started by:  Edda Pugh MD        TAKE 1-2 TABLETS BY MOUTH EVERY NIGHT AT BEDTIME AS NEEDED FOR SLEEP   Quantity:  60 tablet   Refills:  5         These medicines have changed or have updated prescriptions.        Dose/Directions    * albuterol (2.5 MG/3ML) 0.083% neb solution   This may have changed:  Another medication with the same name was changed. Make sure you understand how and when to take each.   Used for:  Mild persistent asthma with acute exacerbation   Changed by:  Sameer Gary MD        Dose:  1 vial   Take 1 vial (2.5 mg) by nebulization every 6 hours as needed for shortness of breath / dyspnea or wheezing   Quantity:  25 vial   Refills:  0       * albuterol 108 (90 BASE) MCG/ACT Inhaler   Commonly known as:  VENTOLIN HFA   This may have changed:  See the new instructions.   Used for:  Intermittent asthma, uncomplicated   Changed by:  Edda Pugh MD        INHALE 2 PUFFS INTO LUNG EVERY 4 HOURS AS NEEDED FOR SHORTNESS OF BREATH/DYSPNEA/WHEEZING   Quantity:  3 Inhaler   Refills:  3       * citalopram 20 MG tablet   Commonly known as:  celeXA   This may have changed:  Another medication with the same name was added. Make sure you understand how and when to take each.   Used for:  Anxiety   Changed by:  Edda Pugh MD        1  tablet orally daily   Quantity:  30 tablet   Refills:  6       * citalopram 40 MG tablet   Commonly known as:  celeXA   This may have changed:  You were already taking a medication with the same name, and this prescription was added. Make sure you understand how and when to take each.   Used for:  Anxiety, Moderate recurrent major depression (H)   Changed by:  Edda Pugh MD        Dose:  40 mg   Take 1 tablet (40 mg) by mouth daily   Quantity:  30 tablet   Refills:  1       * Notice:  This list has 4 medication(s) that are the same as other medications prescribed for you. Read the directions carefully, and ask your doctor or other care provider to review them with you.         Where to get your medicines      These medications were sent to [facility]/pharmacy #[id number] - [facility], [place] - [address] [address]     Phone:  [phone]     albuterol 108 (90 BASE) MCG/ACT Inhaler    benzonatate 200 MG capsule    citalopram 40 MG tablet    traZODone 50 MG tablet                Primary Care Provider Fax #    Physician No Ref-Primary [phone]       No address on file        Goals        General    Psychosocial (pt-stated)     Notes - Note edited  9/20/2016  1:05 PM by Shahrzad Hernandez BSW    As of today's date 9/20/2016 goal is met at 26 - 50%.   Goal Status:  Discontinued   SW unable to contact pt X3  As of today's date 6/23/2016 goal is met at 26 - 50%.   Goal Status:  Active   Pt's mother is continuing to work with county financial worker to get her 2 year old son onto Medical Assistance                                                                                  BOLA Gauthier          Equal Access to Services     [facility]: Hadii jackie mason hadasho Sodonald, waaxda luqadaha, qaybta kaalmada adeegyada, rickey al. So [facility] [phone].    ATENCIÓN: Si habla español, tiene a deal disposición servicios gratuitos de asistencia lingüística. Llame al  [phone].    We comply with applicable federal civil rights laws and [place] laws. We do not discriminate on the basis of race, color, national origin, age, disability, sex, sexual orientation, or gender identity.            Thank you!     Thank you for choosing [facility]  for your care. Our goal is always to provide you with excellent care. Hearing back from our patients is one way we can continue to improve our services. Please take a few minutes to complete the written survey that you may receive in the mail after your visit with us. Thank you!             Your Updated Medication List - Protect others around you: Learn how to safely use, store and throw away your medicines at www.disposemymeds.org.          This list is accurate as of: 1/2/18 12:26 PM.  Always use your most recent med list.                   Brand Name Dispense Instructions for use Diagnosis    * albuterol (2.5 MG/3ML) 0.083% neb solution     25 vial    Take 1 vial (2.5 mg) by nebulization every 6 hours as needed for shortness of breath / dyspnea or wheezing    Mild persistent asthma with acute exacerbation       * albuterol 108 (90 BASE) MCG/ACT Inhaler    VENTOLIN HFA    3 Inhaler    INHALE 2 PUFFS INTO LUNG EVERY 4 HOURS AS NEEDED FOR SHORTNESS OF BREATH/DYSPNEA/WHEEZING    Intermittent asthma, uncomplicated       benzonatate 200 MG capsule    TESSALON    21 capsule    Take 1 capsule (200 mg) by mouth 3 times daily as needed for cough    Viral upper respiratory tract infection       * citalopram 20 MG tablet    celeXA    30 tablet    1 tablet orally daily    Anxiety       * citalopram 40 MG tablet    celeXA    30 tablet    Take 1 tablet (40 mg) by mouth daily    Anxiety, Moderate recurrent major depression (H)       medroxyPROGESTERone 150 MG/ML injection    DEPO-PROVERA    1 mL    Inject 1 mL (150 mg) into the muscle every 3 months    Encounter for initial prescription of injectable contraceptive       traZODone 50 MG  tablet    DESYREL    60 tablet    TAKE 1-2 TABLETS BY MOUTH EVERY NIGHT AT BEDTIME AS NEEDED FOR SLEEP    Moderate recurrent major depression (H)       TYLENOL PO      Take by mouth as needed for mild pain or fever        * Notice:  This list has 4 medication(s) that are the same as other medications prescribed for you. Read the directions carefully, and ask your doctor or other care provider to review them with you.

## 2018-01-02 NOTE — LETTER
My Depression Action Plan  Name: Gloria Sun   Date of Birth 1990  Date: 1/2/2018    My doctor: No Ref-Primary, Physician   My clinic: [address]  [place] [address]  [phone]          GREEN    ZONE   Good Control    What it looks like:     Things are going generally well. You have normal up s and down s. You may even feel depressed from time to time, but bad moods usually last less than a day.   What you need to do:  1. Continue to care for yourself (see self care plan)  2. Check your depression survival kit and update it as needed  3. Follow your physician s recommendations including any medication.  4. Do not stop taking medication unless you consult with your physician first.           YELLOW         ZONE Getting Worse    What it looks like:     Depression is starting to interfere with your life.     It may be hard to get out of bed; you may be starting to isolate yourself from others.    Symptoms of depression are starting to last most all day and this has happened for several days.     You may have suicidal thoughts but they are not constant.   What you need to do:     1. Call your care team, your response to treatment will improve if you keep your care team informed of your progress. Yellow periods are signs an adjustment may need to be made.     2. Continue your self-care, even if you have to fake it!    3. Talk to someone in your support network    4. Open up your depression survival kit           RED    ZONE Medical Alert - Get Help    What it looks like:     Depression is seriously interfering with your life.     You may experience these or other symptoms: You can t get out of bed most days, can t work or engage in other necessary activities, you have trouble taking care of basic hygiene, or basic responsibilities, thoughts of suicide or death that will not go away, self-injurious behavior.     What you need to do:  1. Call your care team  and request a same-day appointment. If they are not available (weekends or after hours) call your local crisis line, emergency room or 911.      Electronically signed by: Edda Pugh, January 2, 2018    Depression Self Care Plan / Survival Kit    Self-Care for Depression  Here s the deal. Your body and mind are really not as separate as most people think.  What you do and think affects how you feel and how you feel influences what you do and think. This means if you do things that people who feel good do, it will help you feel better.  Sometimes this is all it takes.  There is also a place for medication and therapy depending on how severe your depression is, so be sure to consult with your medical provider and/ or Behavioral Health Consultant if your symptoms are worsening or not improving.     In order to better manage my stress, I will:    Exercise  Get some form of exercise, every day. This will help reduce pain and release endorphins, the  feel good  chemicals in your brain. This is almost as good as taking antidepressants!  This is not the same as joining a gym and then never going! (they count on that by the way ) It can be as simple as just going for a walk or doing some gardening, anything that will get you moving.      Hygiene   Maintain good hygiene (Get out of bed in the morning, Make your bed, Brush your teeth, Take a shower, and Get dressed like you were going to work, even if you are unemployed).  If your clothes don't fit try to get ones that do.    Diet  I will strive to eat foods that are good for me, drink plenty of water, and avoid excessive sugar, caffeine, alcohol, and other mood-altering substances.  Some foods that are helpful in depression are: complex carbohydrates, B vitamins, flaxseed, fish or fish oil, fresh fruits and vegetables.    Psychotherapy  I agree to participate in Individual Therapy (if recommended).    Medication  If prescribed medications, I agree to take them.  Missing doses  can result in serious side effects.  I understand that drinking alcohol, or other illicit drug use, may cause potential side effects.  I will not stop my medication abruptly without first discussing it with my provider.    Staying Connected With Others  I will stay in touch with my friends, family members, and my primary care provider/team.    Use your imagination  Be creative.  We all have a creative side; it doesn t matter if it s oil painting, sand castles, or mud pies! This will also kick up the endorphins.    Witness Beauty  (AKA stop and smell the roses) Take a look outside, even in mid-winter. Notice colors, textures. Watch the squirrels and birds.     Service to others  Be of service to others.  There is always someone else in need.  By helping others we can  get out of ourselves  and remember the really important things.  This also provides opportunities for practicing all the other parts of the program.    Humor  Laugh and be silly!  Adjust your TV habits for less news and crime-drama and more comedy.    Control your stress  Try breathing deep, massage therapy, biofeedback, and meditation. Find time to relax each day.     My support system    Clinic Contact:  Phone number:    Contact 1:  Phone number:    Contact 2:  Phone number:    Presybeterian/:  Phone number:    Therapist:  Phone number:    Local crisis center:    Phone number:    Other community support:  Phone number:

## 2018-01-03 ASSESSMENT — ANXIETY QUESTIONNAIRES: GAD7 TOTAL SCORE: 6

## 2018-01-03 ASSESSMENT — ASTHMA QUESTIONNAIRES: ACT_TOTALSCORE: 20

## 2018-03-13 ENCOUNTER — TELEPHONE (OUTPATIENT)
Dept: FAMILY MEDICINE | Facility: CLINIC | Age: 28
End: 2018-03-13

## 2018-03-13 NOTE — TELEPHONE ENCOUNTER
Reason for Call:  Other call back    Detailed comments:     Phone Number Patient can be reached at: Other phone number:  [phone]    Best Time: N/A    Can we leave a detailed message on this number? Not Applicable    Call taken on 3/13/2018 at 12:18 PM by Pina Ordonez

## 2018-03-14 ENCOUNTER — ALLIED HEALTH/NURSE VISIT (OUTPATIENT)
Dept: NURSING | Facility: CLINIC | Age: 28
End: 2018-03-14
Payer: COMMERCIAL

## 2018-03-14 VITALS — SYSTOLIC BLOOD PRESSURE: 110 MMHG | DIASTOLIC BLOOD PRESSURE: 70 MMHG

## 2018-03-14 LAB — BETA HCG QUAL IFA URINE: NEGATIVE

## 2018-03-14 PROCEDURE — 96372 THER/PROPH/DIAG INJ SC/IM: CPT

## 2018-03-14 PROCEDURE — 84703 CHORIONIC GONADOTROPIN ASSAY: CPT | Performed by: FAMILY MEDICINE

## 2018-03-14 NOTE — NURSING NOTE
"Chief Complaint   Patient presents with     Contraception     Depo Injection       Initial /70 Estimated body mass index is 39.48 kg/(m^2) as calculated from the following:    Height as of 1/2/18: 5' 4\" (1.626 m).    Weight as of 1/2/18: 230 lb (104.3 kg).  Medication Reconciliation: unable or not appropriate to perform   Prior to injection verified patient identity using patient's name and date of birth.  Due to injection administration, patient instructed to remain in clinic for 15 minutes  afterwards, and to report any adverse reaction to me immediately.  BP: 110/70           URINE HCG:negative    The following medication was given:     MEDICATION: Depo Provera 150mg  ROUTE: IM  SITE: Deltoid - Right  : [employer]  LOT #: [id number]  EXP:03/2020  NEXT INJECTION DUE: 5/30/18 - 6/13/18   Provider: Keaton ARREAGA CMA ([facility])    "

## 2018-03-14 NOTE — MR AVS SNAPSHOT
After Visit Summary   3/14/2018    Gloria Sun    MRN: [id number]           Patient Information     Date Of Birth          1990        Visit Information        Provider Department      3/14/2018 9:15 AM [facility] [place]        Today's Diagnoses     Contraception    -  1       Follow-ups after your visit        Your next 10 appointments already scheduled     Jun 16, 2018 11:30 AM CDT   (Arrive by 11:15 AM)   Return Visit with Paige Pelayo PA-C   [facility] ([facility])    [address] [place] [id number] [phone]              Who to contact     If you have questions or need follow up information about today's clinic visit or your schedule please contact [facility] directly at [phone].  Normal or non-critical lab and imaging results will be communicated to you by MyChart, letter or phone within 4 business days after the clinic has received the results. If you do not hear from us within 7 days, please contact the clinic through MyChart or phone. If you have a critical or abnormal lab result, we will notify you by phone as soon as possible.  Submit refill requests through [employer] or call your pharmacy and they will forward the refill request to us. Please allow 3 business days for your refill to be completed.          Additional Information About Your Visit        MyChart Information     [employer] gives you secure access to your electronic health record. If you see a primary care provider, you can also send messages to your care team and make appointments. If you have questions, please call your primary care clinic.  If you do not have a primary care provider, please call [phone] and they will assist you.        Care EveryWhere ID     This is your Care EveryWhere ID. This could be used by other organizations to access your [facility] medical records  [id number]          Blood Pressure from Last 3 Encounters:   03/14/18 110/70   01/02/18 108/66   12/11/17 110/64    Weight from Last 3 Encounters:   01/02/18 230 lb (104.3 kg)   11/14/17 228 lb (103.4 kg)   10/17/17 225 lb (102.1 kg)              We Performed the Following     Beta HCG Qual, Urine - [facility] and [place] ([id number])     INJECTION INTRAMUSCULAR OR SUB-Q     Medroxyprogesterone inj  1mg   (Depo Provera J-Code)        Primary Care Provider Fax #    Physician No Ref-Primary [phone]       No address on file        Goals        General    Psychosocial (pt-stated)     Notes - Note edited  9/20/2016  1:05 PM by Shahrzad Hernandez BSW    As of today's date 9/20/2016 goal is met at 26 - 50%.   Goal Status:  Discontinued   SW unable to contact pt X3  As of today's date 6/23/2016 goal is met at 26 - 50%.   Goal Status:  Active   Pt's mother is continuing to work with county financial worker to get her 2 year old son onto Medical Assistance                                                                                  BOLA Gauthier          Equal Access to Services     YOHANA NAVARRO AH: Hadii jackie mason hadasho Sodonald, waaxda luqadaha, qaybta kaalmada nando, rickey salmon . So [facility] [phone].    ATENCIÓN: Si habla español, tiene a deal disposición servicios gratuitos de asistencia lingüística. Llame al [phone].    We comply with applicable federal civil rights laws and [place] laws. We do not discriminate on the basis of race, color, national origin, age, disability, sex, sexual orientation, or gender identity.            Thank you!     Thank you for choosing [facility]  for your care. Our goal is always to provide you with excellent care. Hearing back from our patients is one way we can continue to improve our services. Please take a few minutes to complete the written survey that you may receive in the mail after your visit with us. Thank you!             Your Updated  Medication List - Protect others around you: Learn how to safely use, store and throw away your medicines at www.disposemymeds.org.          This list is accurate as of 3/14/18 10:04 AM.  Always use your most recent med list.                   Brand Name Dispense Instructions for use Diagnosis    * albuterol (2.5 MG/3ML) 0.083% neb solution     25 vial    Take 1 vial (2.5 mg) by nebulization every 6 hours as needed for shortness of breath / dyspnea or wheezing    Mild persistent asthma with acute exacerbation       * albuterol 108 (90 BASE) MCG/ACT Inhaler    VENTOLIN HFA    3 Inhaler    INHALE 2 PUFFS INTO LUNG EVERY 4 HOURS AS NEEDED FOR SHORTNESS OF BREATH/DYSPNEA/WHEEZING    Intermittent asthma, uncomplicated       benzonatate 200 MG capsule    TESSALON    21 capsule    Take 1 capsule (200 mg) by mouth 3 times daily as needed for cough    Viral upper respiratory tract infection       * citalopram 20 MG tablet    celeXA    30 tablet    1 tablet orally daily    Anxiety       * citalopram 40 MG tablet    celeXA    30 tablet    Take 1 tablet (40 mg) by mouth daily    Anxiety, Moderate recurrent major depression (H)       medroxyPROGESTERone 150 MG/ML injection    DEPO-PROVERA    1 mL    Inject 1 mL (150 mg) into the muscle every 3 months    Encounter for initial prescription of injectable contraceptive       traZODone 50 MG tablet    DESYREL    60 tablet    TAKE 1-2 TABLETS BY MOUTH EVERY NIGHT AT BEDTIME AS NEEDED FOR SLEEP    Moderate recurrent major depression (H)       TYLENOL PO      Take by mouth as needed for mild pain or fever        * Notice:  This list has 4 medication(s) that are the same as other medications prescribed for you. Read the directions carefully, and ask your doctor or other care provider to review them with you.

## 2018-03-14 NOTE — PROGRESS NOTES
Follow Up Injection    Patient returning during stated date range given at previous visit: No, urine pregnancy test performed, results (neg - injection administered, positive - injection deferred)      If here at the correct interval:   BP Readings from Last 1 Encounters:   03/14/18 110/70     Wt Readings from Last 1 Encounters:   01/02/18 230 lb (104.3 kg)       Last Pap/exam date:   Lab Results   Component Value Date    PAP NIL 08/03/2017           Side effects or problems with last injection?  No.  Date range is given to patient for next dose: 5/30/2018-6/13/2018    See Medication Note for administration information    Staff Sig: Tere ARREAGA CMA ([facility])

## 2018-03-18 ENCOUNTER — TELEPHONE (OUTPATIENT)
Dept: FAMILY MEDICINE | Facility: CLINIC | Age: 28
End: 2018-03-18

## 2018-03-18 DIAGNOSIS — F41.9 ANXIETY: ICD-10-CM

## 2018-03-18 DIAGNOSIS — F33.1 MODERATE RECURRENT MAJOR DEPRESSION (H): ICD-10-CM

## 2018-03-19 NOTE — TELEPHONE ENCOUNTER
Please advise on medication interaction:    Interacting Medications/Orders:  Citalopram  Oral, Systemic QT Prolonging Agents 2  Oral or Non-Oral, Systemic   1. citalopram    Order: citalopram (CELEXA) 40 MG tablet [Pharmacy Med Name: CITALOPRAM HBR 40 MG TABLET] Route: none  Start: 03/19/2018 End: none Frequency: 1. traZODone    Order ([id number]): traZODone (DESYREL) 50 MG tablet Route: none  Start: 01/02/2018 End: none Frequency:       Clarisa Dunbar RN

## 2018-03-20 RX ORDER — CITALOPRAM HYDROBROMIDE 40 MG/1
TABLET ORAL
Qty: 30 TABLET | Refills: 1 | Status: SHIPPED | OUTPATIENT
Start: 2018-03-20 | End: 2018-03-28

## 2018-03-27 NOTE — PATIENT INSTRUCTIONS
[facility]    If you have any questions regarding to your visit please contact your care team:       Team Red:   Clinic Hours Telephone Number   Dr. Susanne Canales, NP   7am-7pm  Monday - Thursday   7am-5pm  Fridays  [phone]  (Appointment scheduling available 24/7)    Questions about your visit?   Team Line  [phone]   [facility] [place] [facility] 11am-9pm Monday-Friday Saturday-Sunday- 9am-5pm   [place] - 5pm-9pm Monday-Friday Saturday-Sunday- 9am-5pm  [phone] - [place]   [phone] - [place]       What options do I have for visits at the clinic other than the traditional office visit?  To expand how we care for you, many of our providers are utilizing electronic visits (e-visits) and telephone visits, when medically appropriate, for interactions with their patients rather than a visit in the clinic.   We also offer nurse visits for many medical concerns. Just like any other service, we will bill your insurance company for this type of visit based on time spent on the phone with your provider. Not all insurance companies cover these visits. Please check with your medical insurance if this type of visit is covered. You will be responsible for any charges that are not paid by your insurance.      E-visits via [employer]:  generally incur a $35.00 fee.  Telephone visits:  Time spent on the phone: *charged based on time that is spent on the phone in increments of 10 minutes. Estimated cost:   5-10 mins $30.00   11-20 mins. $59.00   21-30 mins. $85.00     Use [employer] (secure email communication and access to your chart) to send your primary care provider a message or make an appointment. Ask someone on your Team how to sign up for [employer].  For a Price Quote for your services, please call our Consumer Price Line at [phone].      As always, Thank you for trusting us with your health care needs!    Discharged  by Debby Butterfield MA.

## 2018-03-27 NOTE — PROGRESS NOTES
"SUBJECTIVE:  Gloria Sun is a 27 year old morbidly obese female lost to follow-up with bariatric medicine who presents with mild recurrent depression, anxiety, and migraines. Symptom onset has been improving for a time period of weeks. Severity is described as mild. Course of her symptoms over time is improving on citalopram and Topamax with side effects. I have reviewed the patient's medical history in detail and updated the computerized patient record.     OBJECTIVE:  EXAM:  BP 96/52  Pulse 70  Temp 97.7  F (36.5  C)  Resp 16  Ht 5' 4\" (1.626 m)  Wt 233 lb (105.7 kg)  SpO2 98%  BMI 39.99 kg/m2   Constitutional: alert, no distress and cooperative   Psychiatric: mentation appears normal and affect normal/bright    ASSESSMENT/PLAN:  (F33.0) Major depressive disorder, recurrent episode, mild (H)  (primary encounter diagnosis)  (F41.9) Anxiety  Comment: Well controlled with medications without side effects.   Plan: citalopram (CELEXA) 40 MG tablet          (G43.709) Chronic migraine  Plan: topiramate (TOPAMAX) 15 MG capsule          (E66.01) Morbid obesity (H)  Plan: follow-up with bariatric medicine as planned       Susanne Morales MD    HATTIE Score (Last Two) 7/18/2013   HATTIE Raw Score 52   Activation Score 100   HATTIE Level 4         "

## 2018-03-28 ENCOUNTER — OFFICE VISIT (OUTPATIENT)
Dept: FAMILY MEDICINE | Facility: CLINIC | Age: 28
End: 2018-03-28
Payer: COMMERCIAL

## 2018-03-28 VITALS
DIASTOLIC BLOOD PRESSURE: 52 MMHG | OXYGEN SATURATION: 98 % | RESPIRATION RATE: 16 BRPM | WEIGHT: 233 LBS | HEIGHT: 64 IN | SYSTOLIC BLOOD PRESSURE: 96 MMHG | HEART RATE: 70 BPM | TEMPERATURE: 97.7 F | BODY MASS INDEX: 39.78 KG/M2

## 2018-03-28 DIAGNOSIS — F41.9 ANXIETY: ICD-10-CM

## 2018-03-28 DIAGNOSIS — E66.01 MORBID OBESITY (H): ICD-10-CM

## 2018-03-28 DIAGNOSIS — F33.0 MAJOR DEPRESSIVE DISORDER, RECURRENT EPISODE, MILD (H): Primary | ICD-10-CM

## 2018-03-28 PROBLEM — F33.1 MAJOR DEPRESSIVE DISORDER, RECURRENT EPISODE, MODERATE (H): Status: ACTIVE | Noted: 2018-03-28

## 2018-03-28 PROBLEM — R73.09 ABNORMAL GLUCOSE TOLERANCE TEST: Status: RESOLVED | Noted: 2017-05-19 | Resolved: 2018-03-28

## 2018-03-28 PROCEDURE — 99213 OFFICE O/P EST LOW 20 MIN: CPT | Performed by: FAMILY MEDICINE

## 2018-03-28 RX ORDER — CITALOPRAM HYDROBROMIDE 40 MG/1
TABLET ORAL
Qty: 90 TABLET | Refills: 1 | Status: SHIPPED | OUTPATIENT
Start: 2018-03-28 | End: 2018-08-15

## 2018-03-28 RX ORDER — TOPIRAMATE SPINKLE 15 MG/1
CAPSULE ORAL
Qty: 360 CAPSULE | Refills: 0 | Status: SHIPPED | OUTPATIENT
Start: 2018-03-28 | End: 2018-06-16

## 2018-03-28 NOTE — MR AVS SNAPSHOT
After Visit Summary   3/28/2018    Gloria Sun    MRN: [id number]           Patient Information     Date Of Birth          1990        Visit Information        Provider Department      3/28/2018 10:40 AM Susanne Morales MD [facility]        Today's Diagnoses     Major depressive disorder, recurrent episode, mild (H)    -  1    Anxiety        Chronic migraine        Morbid obesity (H)          Care Instructions    [facility]    If you have any questions regarding to your visit please contact your care team:       Team Red:   Clinic Hours Telephone Number   Dr. Susanne Canales, NP   7am-7pm  Monday - Thursday   7am-5pm  Fridays  [phone]  (Appointment scheduling available 24/7)    Questions about your visit?   Team Line  [phone]   Urgent Care - [place] and [facility] - 11am-9pm Monday-Friday Saturday-Sunday- 9am-5pm   [place] - 5pm-9pm Monday-Friday Saturday-Sunday- 9am-5pm  [phone] - [facility]  [phone] - [place]       What options do I have for visits at the clinic other than the traditional office visit?  To expand how we care for you, many of our providers are utilizing electronic visits (e-visits) and telephone visits, when medically appropriate, for interactions with their patients rather than a visit in the clinic.   We also offer nurse visits for many medical concerns. Just like any other service, we will bill your insurance company for this type of visit based on time spent on the phone with your provider. Not all insurance companies cover these visits. Please check with your medical insurance if this type of visit is covered. You will be responsible for any charges that are not paid by your insurance.      E-visits via PadProof:  generally incur a $35.00 fee.  Telephone visits:  Time spent on the phone: *charged based on time that is spent on the phone in increments of 10  minutes. Estimated cost:   5-10 mins $30.00   11-20 mins. $59.00   21-30 mins. $85.00     Use Sirionahart (secure email communication and access to your chart) to send your primary care provider a message or make an appointment. Ask someone on your Team how to sign up for KidzVuz.  For a Price Quote for your services, please call our [employer] Line at [phone].      As always, Thank you for trusting us with your health care needs!    Discharged by Debby Butterfield MA.            Follow-ups after your visit        Follow-up notes from your care team     Return in about 4 months (around 8/3/2018) for physical (fasting labs up to one week prior).      Your next 10 appointments already scheduled     Jun 16, 2018 11:30 AM CDT   (Arrive by 11:15 AM)   Return Visit with Paige Pelayo PA-C   [facility] ([facility] [place])    [address] [place] [id number] [phone]              Who to contact     If you have questions or need follow up information about today's clinic visit or your schedule please contact [facility] directly at [phone].  Normal or non-critical lab and imaging results will be communicated to you by Sirionahart, letter or phone within 4 business days after the clinic has received the results. If you do not hear from us within 7 days, please contact the clinic through Sirionahart or phone. If you have a critical or abnormal lab result, we will notify you by phone as soon as possible.  Submit refill requests through KidzVuz or call your pharmacy and they will forward the refill request to us. Please allow 3 business days for your refill to be completed.          Additional Information About Your Visit        Sirionahart Information     KidzVuz gives you secure access to your electronic health record. If you see a primary care provider, you can also send messages to your care team and make appointments. If you have  "questions, please call your primary care clinic.  If you do not have a primary care provider, please call [phone] and they will assist you.        Care EveryWhere ID     This is your Care EveryWhere ID. This could be used by other organizations to access your [facility] medical records  [id number]        Your Vitals Were     Pulse Temperature Respirations Height Pulse Oximetry BMI (Body Mass Index)    70 97.7  F (36.5  C) 16 5' 4\" (1.626 m) 98% 39.99 kg/m2       Blood Pressure from Last 3 Encounters:   03/28/18 96/52   03/14/18 110/70   01/02/18 108/66    Weight from Last 3 Encounters:   03/28/18 233 lb (105.7 kg)   01/02/18 230 lb (104.3 kg)   11/14/17 228 lb (103.4 kg)              Today, you had the following     No orders found for display         Today's Medication Changes          These changes are accurate as of 3/28/18 11:22 AM.  If you have any questions, ask your nurse or doctor.               Start taking these medicines.        Dose/Directions    topiramate 15 MG capsule   Commonly known as:  TOPAMAX   Used for:  Chronic migraine   Started by:  Susanne Morales MD        Take 15 mg by mouth every morning and 30 mg every evening for one week, then increase to 30 mg two times per day   Quantity:  360 capsule   Refills:  0         These medicines have changed or have updated prescriptions.        Dose/Directions    citalopram 40 MG tablet   Commonly known as:  celeXA   This may have changed:  See the new instructions.   Used for:  Anxiety   Changed by:  Susanne Morales MD        TAKE 1 TABLET (40 MG) BY MOUTH DAILY   Quantity:  90 tablet   Refills:  1            Where to get your medicines      These medications were sent to [facility]/pharmacy #[id number] [facility], [place] - [address] [address]     Phone:  [phone]     citalopram 40 MG tablet    topiramate 15 MG capsule                Primary Care Provider Office Phone # Fax #    Susanne Morales [phone] " [phone]       [address] [facility]  [place] [address]        Goals        General    Psychosocial (pt-stated)     Notes - Note edited  9/20/2016  1:05 PM by Shahrzad Hernandez, BSW    As of today's date 9/20/2016 goal is met at 26 - 50%.   Goal Status:  Discontinued   SW unable to contact pt X3  As of today's date 6/23/2016 goal is met at 26 - 50%.   Goal Status:  Active   Pt's mother is continuing to work with county financial worker to get her 2 year old son onto Medical Assistance                                                                                  John Hernandez, LSW          Equal Access to Services     [facility]: Hadii aad ku hadasho Soomaali, waaxda luqadaha, qaybta kaalmada adeegyada, rickey glover haysirena salmon . So [facility] [phone].    ATENCIÓN: Si habla español, tiene a deal disposición servicios gratuitos de asistencia lingüística. Llame al [phone].    We comply with applicable federal civil rights laws and [place] laws. We do not discriminate on the basis of race, color, national origin, age, disability, sex, sexual orientation, or gender identity.            Thank you!     Thank you for choosing [facility]  for your care. Our goal is always to provide you with excellent care. Hearing back from our patients is one way we can continue to improve our services. Please take a few minutes to complete the written survey that you may receive in the mail after your visit with us. Thank you!             Your Updated Medication List - Protect others around you: Learn how to safely use, store and throw away your medicines at www.disposemymeds.org.          This list is accurate as of 3/28/18 11:22 AM.  Always use your most recent med list.                   Brand Name Dispense Instructions for use Diagnosis    * albuterol (2.5 MG/3ML) 0.083% neb solution     25 vial    Take 1 vial (2.5 mg) by nebulization every 6 hours as needed for shortness of breath / dyspnea or  wheezing    Mild persistent asthma with acute exacerbation       * albuterol 108 (90 BASE) MCG/ACT Inhaler    VENTOLIN HFA    3 Inhaler    INHALE 2 PUFFS INTO LUNG EVERY 4 HOURS AS NEEDED FOR SHORTNESS OF BREATH/DYSPNEA/WHEEZING    Intermittent asthma, uncomplicated       citalopram 40 MG tablet    celeXA    90 tablet    TAKE 1 TABLET (40 MG) BY MOUTH DAILY    Anxiety       medroxyPROGESTERone 150 MG/ML injection    DEPO-PROVERA    1 mL    Inject 1 mL (150 mg) into the muscle every 3 months    Encounter for initial prescription of injectable contraceptive       topiramate 15 MG capsule    TOPAMAX    360 capsule    Take 15 mg by mouth every morning and 30 mg every evening for one week, then increase to 30 mg two times per day    Chronic migraine       traZODone 50 MG tablet    DESYREL    60 tablet    TAKE 1-2 TABLETS BY MOUTH EVERY NIGHT AT BEDTIME AS NEEDED FOR SLEEP    Moderate recurrent major depression (H)       TYLENOL PO      Take by mouth as needed for mild pain or fever        * Notice:  This list has 2 medication(s) that are the same as other medications prescribed for you. Read the directions carefully, and ask your doctor or other care provider to review them with you.

## 2018-06-16 ENCOUNTER — OFFICE VISIT (OUTPATIENT)
Dept: ENDOCRINOLOGY | Facility: CLINIC | Age: 28
End: 2018-06-16
Payer: COMMERCIAL

## 2018-06-16 VITALS
HEART RATE: 47 BPM | OXYGEN SATURATION: 100 % | SYSTOLIC BLOOD PRESSURE: 116 MMHG | WEIGHT: 233.3 LBS | DIASTOLIC BLOOD PRESSURE: 66 MMHG | BODY MASS INDEX: 39.83 KG/M2 | TEMPERATURE: 98.4 F | HEIGHT: 64 IN

## 2018-06-16 DIAGNOSIS — Z98.84 S/P LAPAROSCOPIC SLEEVE GASTRECTOMY: ICD-10-CM

## 2018-06-16 DIAGNOSIS — K21.9 GASTROESOPHAGEAL REFLUX DISEASE, ESOPHAGITIS PRESENCE NOT SPECIFIED: ICD-10-CM

## 2018-06-16 DIAGNOSIS — E66.01 MORBID OBESITY (H): Primary | ICD-10-CM

## 2018-06-16 LAB
ALBUMIN SERPL-MCNC: 3.8 G/DL (ref 3.4–5)
ALP SERPL-CCNC: 76 U/L (ref 40–150)
ALT SERPL W P-5'-P-CCNC: 18 U/L (ref 0–50)
ANION GAP SERPL CALCULATED.3IONS-SCNC: 6 MMOL/L (ref 3–14)
AST SERPL W P-5'-P-CCNC: 14 U/L (ref 0–45)
BILIRUB SERPL-MCNC: 1.4 MG/DL (ref 0.2–1.3)
BUN SERPL-MCNC: 8 MG/DL (ref 7–30)
CALCIUM SERPL-MCNC: 8.4 MG/DL (ref 8.5–10.1)
CHLORIDE SERPL-SCNC: 107 MMOL/L (ref 94–109)
CO2 SERPL-SCNC: 28 MMOL/L (ref 20–32)
CREAT SERPL-MCNC: 0.87 MG/DL (ref 0.52–1.04)
ERYTHROCYTE [DISTWIDTH] IN BLOOD BY AUTOMATED COUNT: 13.3 % (ref 10–15)
GFR SERPL CREATININE-BSD FRML MDRD: 78 ML/MIN/1.7M2
GLUCOSE SERPL-MCNC: 89 MG/DL (ref 70–99)
HCT VFR BLD AUTO: 42 % (ref 35–47)
HGB BLD-MCNC: 13.6 G/DL (ref 11.7–15.7)
MCH RBC QN AUTO: 28.6 PG (ref 26.5–33)
MCHC RBC AUTO-ENTMCNC: 32.4 G/DL (ref 31.5–36.5)
MCV RBC AUTO: 88 FL (ref 78–100)
PLATELET # BLD AUTO: 209 10E9/L (ref 150–450)
POTASSIUM SERPL-SCNC: 3.9 MMOL/L (ref 3.4–5.3)
PROT SERPL-MCNC: 7.2 G/DL (ref 6.8–8.8)
PTH-INTACT SERPL-MCNC: 62 PG/ML (ref 18–80)
RBC # BLD AUTO: 4.76 10E12/L (ref 3.8–5.2)
SODIUM SERPL-SCNC: 141 MMOL/L (ref 133–144)
VIT B12 SERPL-MCNC: 378 PG/ML (ref 193–986)
WBC # BLD AUTO: 6.5 10E9/L (ref 4–11)

## 2018-06-16 RX ORDER — OMEPRAZOLE 40 MG/1
40 CAPSULE, DELAYED RELEASE ORAL DAILY
Qty: 30 CAPSULE | Refills: 5 | Status: SHIPPED | OUTPATIENT
Start: 2018-06-16 | End: 2021-04-21

## 2018-06-16 RX ORDER — TOPIRAMATE 25 MG/1
TABLET, FILM COATED ORAL
Qty: 90 TABLET | Refills: 3 | Status: SHIPPED | OUTPATIENT
Start: 2018-06-16 | End: 2018-08-10

## 2018-06-16 RX ORDER — PHENTERMINE HYDROCHLORIDE 15 MG/1
15 CAPSULE ORAL EVERY MORNING
Qty: 30 CAPSULE | Refills: 3 | Status: SHIPPED | OUTPATIENT
Start: 2018-06-16 | End: 2018-08-10

## 2018-06-16 ASSESSMENT — ENCOUNTER SYMPTOMS
NECK MASS: 0
TROUBLE SWALLOWING: 0
DYSPNEA ON EXERTION: 0
DECREASED LIBIDO: 0
HEMATURIA: 1
COUGH DISTURBING SLEEP: 0
FLANK PAIN: 0
POSTURAL DYSPNEA: 0
DIFFICULTY URINATING: 0
SNORES LOUDLY: 0
SPUTUM PRODUCTION: 0
SHORTNESS OF BREATH: 0
SMELL DISTURBANCE: 0
TASTE DISTURBANCE: 0
SKIN CHANGES: 1
SINUS PAIN: 0
SINUS CONGESTION: 0
COUGH: 1
HOARSE VOICE: 0
SORE THROAT: 0
HEMOPTYSIS: 0
HOT FLASHES: 0
NAIL CHANGES: 0
DYSURIA: 0
POOR WOUND HEALING: 0
WHEEZING: 0

## 2018-06-16 ASSESSMENT — PAIN SCALES - GENERAL: PAINLEVEL: NO PAIN (0)

## 2018-06-16 NOTE — MR AVS SNAPSHOT
After Visit Summary   6/16/2018    Gloria Sun    MRN: [id number]           Patient Information     Date Of Birth          1990        Visit Information        Provider Department      6/16/2018 11:30 AM Paige Pelayo PA-C [facility]        Today's Diagnoses     Morbid obesity (H)    -  1    Gastroesophageal reflux disease, esophagitis presence not specified          Care Instructions    Restart topiramate ramp to 75mg  Start phentermine 15mg    Start taking omeprazole to see if this helps with GERD symptoms. PPI helped when taking for 14 days in the past.    See Paige in 3-4 months for return [facility] visit call [phone] to schedule    MEDICATION STARTED AT THIS APPOINTMENT    We are starting Phentermine. Take one tablet in the morning.  Call the nurse at [phone] if you have any questions or concerns. (Do not stop taking it if you don't think it's working. For some people it works without them knowing it.)    Phentermine is being prescribed because you identified hunger as one of the main causes for your extra weight.      Our patients on Phentermine find that they:    >feel less hunger    >find it easier to push the plate away   >have an easier time eating less    For some of our patients, these feelings are very real and immediate. For other patients, the feelings are less obvious. They don't feel much of a change but find they've lost weight. Like all weight loss medications, Phentermine  works best when you help it work. This means:  1. Having less tempting high calorie (fattening) food around the house or office. (For people with strong cravings this is very important.)   2. Staying away from situations or people that may trigger your cravings .   3. Eating out only one time or less each week.  4. Eating your meals at a table with the TV or computer off.    Side-effects. Phentermine is generally well tolerated. The main side-effects we see are  feelings of racing pulse or rapid heart beat. Some people can get an elevated blood pressure. Because of this we may have you come back within a week or so of starting the medication for a blood pressure check.         In order to get refills of this or any medication we prescribe you must be seen in the medical weight mgmt clinic every 2-3 months. Please have your pharmacy fax a refill request to [phone].        MEDICATION STARTED AT THIS APPOINTMENT  We are starting topiramate at bedtime.  Start one tab, 25 mg, for a week. Go up to 50 mg (2 tabs) for the next week. At the third week, take   3 tabs (75 mg).  Stay at 3 tabs until you are seen again. Call the nurse at [phone] if you have any questions or concerns. (Do not stop taking it if you don't think it's working. For some people it works even though they do not feel much different.)    Topiramate (Topamax) is a medication that is used most often to treat migraine headaches or for seizures. It has also been found to help with weight loss. Although it's not currently FDA approved for weight loss, it has been used safely for a number of years to help people who are carrying extra weight.     Just how topiramate helps with weight loss has not been exactly determined. However it seems to work on areas of the brain to quiet down signals related to eating.      Topiramate may make you:    >feel less interest in eating in between meals   >think less about food and eating   >find it easier to push the plate away   >find giving up pop easier    >have an easier time eating less    For some of our patients, the pills work right away. They feel and think quite differently about food. Other patients don't feel much of a change but find in fact they have lost weight! Like all weight loss medications, topiramate works best when you help it work.  This means:    1) Have less tempting high calorie (fattening) food around the house or office    2) Have lower calorie  food (fruits, vegetables,low fat meats and dairy) for snacks    3) Eat out only one time or less each week.   4) Eat your meals at a table with the TV or computer off.    Side-effects. Topiramate is generally well tolerated. The main side-effects we see are:   Tingling in hands,feet, or face (usually not very troublesome)   Mental confusion and word finding trouble (about 10% of patients have this.)     Feeling sleepy or a bit dopey- this goes away very soon after starting.    One of the dangers of topiramate is the possibility of birth defects--if you get pregnant when you are on it, there is the risk that your baby will be born with a cleft lip or palate.  If you are on topiramate and of child bearing age, you need to be on a reliable form of birth control or refrain from sexual intercourse.     Please refer to the pharmacy insert for more information on side-effects. Since many pharmacists are not familiar with the use of topiramate in weight loss, calling the clinic will get you the most accurate information on the use of this medication for weight loss.     In order to get refills of this or any medication we prescribe you must be seen in the medical weight mgmt clinic every 2-3 months. Please have your pharmacy fax a refill request to [phone].                  Follow-ups after your visit        Follow-up notes from your care team     Return in 3 months (on 9/16/2018).      Your next 10 appointments already scheduled     Jun 16, 2018 11:30 AM CDT   (Arrive by 11:15 AM)   Return Visit with Paige Pelayo PA-C   [facility] ([facility])    [address] [place] [id number] [phone]              Who to contact     Please call your clinic at [phone] to:    Ask questions about your health    Make or cancel appointments    Discuss your medicines    Learn about your test results    Speak to your doctor             "Additional Information About Your Visit        [employer] | B[employer]management [employer]hart Information     Hydrelis gives you secure access to your electronic health record. If you see a primary care provider, you can also send messages to your care team and make appointments. If you have questions, please call your primary care clinic.  If you do not have a primary care provider, please call [phone] and they will assist you.      Hydrelis is an electronic gateway that provides easy, online access to your medical records. With Hydrelis, you can request a clinic appointment, read your test results, renew a prescription or communicate with your care team.     To access your existing account, please contact your [facility] or call [phone] for assistance.        Care EveryWhere ID     This is your Care EveryWhere ID. This could be used by other organizations to access your [place] medical records  [id number]        Your Vitals Were     Pulse Temperature Height Pulse Oximetry BMI (Body Mass Index)       47 98.4  F (36.9  C) (Oral) 5' 4\" 100% 40.05 kg/m2        Blood Pressure from Last 3 Encounters:   06/16/18 116/66   03/28/18 96/52   03/14/18 110/70    Weight from Last 3 Encounters:   06/16/18 233 lb 4.8 oz   03/28/18 233 lb   01/02/18 230 lb              Today, you had the following     No orders found for display         Today's Medication Changes          These changes are accurate as of 6/16/18 11:21 AM.  If you have any questions, ask your nurse or doctor.               Start taking these medicines.        Dose/Directions    omeprazole 40 MG capsule   Commonly known as:  priLOSEC   Used for:  Gastroesophageal reflux disease, esophagitis presence not specified   Started by:  Paige Pelayo PA-C        Dose:  40 mg   Take 1 capsule (40 mg) by mouth daily   Quantity:  30 capsule   Refills:  5       phentermine 15 MG capsule   Used for:  Morbid obesity (H)   Started by:  Paige Pelayo PA-C     "    Dose:  15 mg   Take 1 capsule (15 mg) by mouth every morning   Quantity:  30 capsule   Refills:  3       topiramate 25 MG tablet   Commonly known as:  TOPAMAX   Used for:  Morbid obesity (H)   Started by:  Paige Pelayo PA-C        25mg at bedtime for week 1, 50mg at bedtime for 1 week, and 75mg at bedtime thereafter   Quantity:  90 tablet   Refills:  3            Where to get your medicines      These medications were sent to [facility]/pharmacy #[id number] - [place], [place] - [address] [facility]  [address] [facility] [address]     Phone:  [phone]     omeprazole 40 MG capsule    topiramate 25 MG tablet         Some of these will need a paper prescription and others can be bought over the counter.  Ask your nurse if you have questions.     Bring a paper prescription for each of these medications     phentermine 15 MG capsule                Primary Care Provider Office Phone # Fax #    Susanne Morales [phone] [address] [facility] [address]        Goals        General    Psychosocial (pt-stated)     Notes - Note edited  9/20/2016  1:05 PM by Shahrzad Hernandez BSW    As of today's date 9/20/2016 goal is met at 26 - 50%.   Goal Status:  Discontinued   SW unable to contact pt X3  As of today's date 6/23/2016 goal is met at 26 - 50%.   Goal Status:  Active   Pt's mother is continuing to work with county financial worker to get her 2 year old son onto Medical Assistance                                                                                  BOLA Gauthier          Equal Access to Services     [facility]: Hadii aad ku hadasho Soomaali, waaxda luqadaha, qaybta kaalmada adeegyada, waxay riverain haykamillen ayo salmon . So [facility] [phone].    ATENCIÓN: Si habla español, tiene a deal disposición servicios gratuitos de asistencia lingüística. Llame al [phone].    We comply with applicable federal civil rights laws and [place] laws. We do not discriminate  on the basis of race, color, national origin, age, disability, sex, sexual orientation, or gender identity.            Thank you!     Thank you for choosing [facility]NAG[facility]  for your care. Our goal is always to provide you with excellent care. Hearing back from our patients is one way we can continue to improve our services. Please take a few minutes to complete the written survey that you may receive in the mail after your visit with us. Thank you!             Your Updated Medication List - Protect others around you: Learn how to safely use, store and throw away your medicines at www.disposemymeds.org.          This list is accurate as of 6/16/18 11:21 AM.  Always use your most recent med list.                   Brand Name Dispense Instructions for use Diagnosis    * albuterol (2.5 MG/3ML) 0.083% neb solution     25 vial    Take 1 vial (2.5 mg) by nebulization every 6 hours as needed for shortness of breath / dyspnea or wheezing    Mild persistent asthma with acute exacerbation       * albuterol 108 (90 Base) MCG/ACT Inhaler    VENTOLIN HFA    3 Inhaler    INHALE 2 PUFFS INTO LUNG EVERY 4 HOURS AS NEEDED FOR SHORTNESS OF BREATH/DYSPNEA/WHEEZING    Intermittent asthma, uncomplicated       citalopram 40 MG tablet    celeXA    90 tablet    TAKE 1 TABLET (40 MG) BY MOUTH DAILY    Anxiety       omeprazole 40 MG capsule    priLOSEC    30 capsule    Take 1 capsule (40 mg) by mouth daily    Gastroesophageal reflux disease, esophagitis presence not specified       phentermine 15 MG capsule     30 capsule    Take 1 capsule (15 mg) by mouth every morning    Morbid obesity (H)       topiramate 25 MG tablet    TOPAMAX    90 tablet    25mg at bedtime for week 1, 50mg at bedtime for 1 week, and 75mg at bedtime thereafter    Morbid obesity (H)       traZODone 50 MG tablet    DESYREL    60 tablet    TAKE 1-2 TABLETS BY MOUTH EVERY NIGHT AT BEDTIME AS NEEDED FOR SLEEP    Moderate recurrent major depression (H)        TYLENOL PO      Take by mouth as needed for mild pain or fever        * Notice:  This list has 2 medication(s) that are the same as other medications prescribed for you. Read the directions carefully, and ask your doctor or other care provider to review them with you.

## 2018-06-16 NOTE — PROGRESS NOTES
"    Return Medical Weight Management Note     Gloria Sun  MRN:  [id number]  :  1990  CINDI:  2018    Dear Susanne Morales,    I had the pleasure of seeing your patient Gloria Sun.  She is a 27 year old female who I am continuing to see for treatment of obesity related to:       3/9/2016   I have the following co-morbidities associated with obesity: Asthma, Anxiety, Back Pain       INTERVAL HISTORY:  S/P sleeve gastrectomy in 2012 with pre-weight 240, pito weight 160.     Had a baby 1 year ago and gained 60 lbs during pregnancy.     She has seen Dr Yañez in the past and took topiramate.      CURRENT WEIGHT:   233 lbs 4.8 oz    Wt Readings from Last 4 Encounters:   18 233 lb 4.8 oz   18 233 lb   18 230 lb   17 228 lb       Height:  5' 4\"  Body Mass Index:  Body mass index is 40.05 kg/(m^2).  Vitals:  /66 (BP Location: Left arm, Patient Position: Chair, Cuff Size: Adult Large)  Pulse (!) 47  Temp 98.4  F (36.9  C) (Oral)  Ht 5' 4\"  Wt 233 lb 4.8 oz  SpO2 100%  BMI 40.05 kg/m2    Initial consult weight was 240 prior to weight loss surgery.  Weight change since last seen is up 32 pounds.   Total loss is 7 pounds.    Diet and Activity Changes Since Last Visit Reviewed With Patient 2018   I have made the following changes to my diet since my last visit: n\a   With regards to my diet, I am still struggling with: sweets\bread   For breakfast, I typically eat: -   For lunch, I typically eat: -   For supper, I typically eat: -   For snack(s), I typically eat: -   I have made the following changes to my activity/exercise since my last visit: n\a   With regards to my activity/exercise, I am still struggling with: lizzy to gym       MEDICATIONS:   Current Outpatient Prescriptions   Medication     Acetaminophen (TYLENOL PO)     albuterol (2.5 MG/3ML) 0.083% neb solution     albuterol (VENTOLIN HFA) 108 (90 BASE) MCG/ACT Inhaler     citalopram (CELEXA) 40 MG " tablet     traZODone (DESYREL) 50 MG tablet     No current facility-administered medications for this visit.        Weight Loss Medication History Reviewed With Patient 6/16/2018   Which weight loss medications are you currently taking on a regular basis?  None   If you are not taking a weight loss medication that was prescribed to you, please indicate why: It did not seem to be helping me   Are you having any side effects from the weight loss medication that we have prescribed you? No       ASSESSMENT/PLAN:    27 y.o. Female with hx of sleeve gastrectomy with weight regain.    Restart topiramate ramp to 75 mg. She is aware of birth defects risk.  She is not sexually active and she is aware that she will need reliable birth control.  Start phentermine 15 mg    Start taking omeprazole to see if this helps with GERD symptoms. PPI helped when taking for 14 days in the past.    MEDICATION STARTED AT THIS APPOINTMENT    We are starting Phentermine. Take one tablet in the morning.  Call the nurse at [phone] if you have any questions or concerns. (Do not stop taking it if you don't think it's working. For some people it works without them knowing it.)    Phentermine is being prescribed because you identified hunger as one of the main causes for your extra weight.      Our patients on Phentermine find that they:    >feel less hunger    >find it easier to push the plate away   >have an easier time eating less    For some of our patients, these feelings are very real and immediate. For other patients, the feelings are less obvious. They don't feel much of a change but find they've lost weight. Like all weight loss medications, Phentermine  works best when you help it work. This means:  1. Having less tempting high calorie (fattening) food around the house or office. (For people with strong cravings this is very important.)   2. Staying away from situations or people that may trigger your cravings .   3. Eating out only  one time or less each week.  4. Eating your meals at a table with the TV or computer off.    Side-effects. Phentermine is generally well tolerated. The main side-effects we see are feelings of racing pulse or rapid heart beat. Some people can get an elevated blood pressure. Because of this we may have you come back within a week or so of starting the medication for a blood pressure check.         In order to get refills of this or any medication we prescribe you must be seen in the medical weight mgmt clinic every 2-3 months. Please have your pharmacy fax a refill request to [phone].        MEDICATION STARTED AT THIS APPOINTMENT  We are starting topiramate at bedtime.  Start one tab, 25 mg, for a week. Go up to 50 mg (2 tabs) for the next week. At the third week, take   3 tabs (75 mg).  Stay at 3 tabs until you are seen again. Call the nurse at [phone] if you have any questions or concerns. (Do not stop taking it if you don't think it's working. For some people it works even though they do not feel much different.)    Topiramate (Topamax) is a medication that is used most often to treat migraine headaches or for seizures. It has also been found to help with weight loss. Although it's not currently FDA approved for weight loss, it has been used safely for a number of years to help people who are carrying extra weight.     Just how topiramate helps with weight loss has not been exactly determined. However it seems to work on areas of the brain to quiet down signals related to eating.      Topiramate may make you:    >feel less interest in eating in between meals   >think less about food and eating   >find it easier to push the plate away   >find giving up pop easier    >have an easier time eating less    For some of our patients, the pills work right away. They feel and think quite differently about food. Other patients don't feel much of a change but find in fact they have lost weight! Like all weight loss  medications, topiramate works best when you help it work.  This means:    1) Have less tempting high calorie (fattening) food around the house or office    2) Have lower calorie food (fruits, vegetables,low fat meats and dairy) for snacks    3) Eat out only one time or less each week.   4) Eat your meals at a table with the TV or computer off.    Side-effects. Topiramate is generally well tolerated. The main side-effects we see are:   Tingling in hands,feet, or face (usually not very troublesome)   Mental confusion and word finding trouble (about 10% of patients have this.)     Feeling sleepy or a bit dopey- this goes away very soon after starting.    One of the dangers of topiramate is the possibility of birth defects--if you get pregnant when you are on it, there is the risk that your baby will be born with a cleft lip or palate.  If you are on topiramate and of child bearing age, you need to be on a reliable form of birth control or refrain from sexual intercourse.     Please refer to the pharmacy insert for more information on side-effects. Since many pharmacists are not familiar with the use of topiramate in weight loss, calling the clinic will get you the most accurate information on the use of this medication for weight loss.     In order to get refills of this or any medication we prescribe you must be seen in the medical weight mgmt clinic every 2-3 months. Please have your pharmacy fax a refill request to [phone].      FOLLOW-UP:    12 weeks.    Time: 30 min spent on evaluation, management, counseling, education, & motivational interviewing with greater than 50 % of the total time was spent on counseling and coordinating care    Sincerely,    Paige Pelayo PA-C

## 2018-06-16 NOTE — PATIENT INSTRUCTIONS
Restart topiramate ramp to 75mg  Start phentermine 15mg    Start taking omeprazole to see if this helps with GERD symptoms. PPI helped when taking for 14 days in the past.    See Paige in 3-4 months for return [facility] visit call [phone] to schedule    MEDICATION STARTED AT THIS APPOINTMENT    We are starting Phentermine. Take one tablet in the morning.  Call the nurse at [phone] if you have any questions or concerns. (Do not stop taking it if you don't think it's working. For some people it works without them knowing it.)    Phentermine is being prescribed because you identified hunger as one of the main causes for your extra weight.      Our patients on Phentermine find that they:    >feel less hunger    >find it easier to push the plate away   >have an easier time eating less    For some of our patients, these feelings are very real and immediate. For other patients, the feelings are less obvious. They don't feel much of a change but find they've lost weight. Like all weight loss medications, Phentermine  works best when you help it work. This means:  1. Having less tempting high calorie (fattening) food around the house or office. (For people with strong cravings this is very important.)   2. Staying away from situations or people that may trigger your cravings .   3. Eating out only one time or less each week.  4. Eating your meals at a table with the TV or computer off.    Side-effects. Phentermine is generally well tolerated. The main side-effects we see are feelings of racing pulse or rapid heart beat. Some people can get an elevated blood pressure. Because of this we may have you come back within a week or so of starting the medication for a blood pressure check.         In order to get refills of this or any medication we prescribe you must be seen in the medical weight mgmt clinic every 2-3 months. Please have your pharmacy fax a refill request to [phone].        MEDICATION STARTED AT THIS  APPOINTMENT  We are starting topiramate at bedtime.  Start one tab, 25 mg, for a week. Go up to 50 mg (2 tabs) for the next week. At the third week, take   3 tabs (75 mg).  Stay at 3 tabs until you are seen again. Call the nurse at [phone] if you have any questions or concerns. (Do not stop taking it if you don't think it's working. For some people it works even though they do not feel much different.)    Topiramate (Topamax) is a medication that is used most often to treat migraine headaches or for seizures. It has also been found to help with weight loss. Although it's not currently FDA approved for weight loss, it has been used safely for a number of years to help people who are carrying extra weight.     Just how topiramate helps with weight loss has not been exactly determined. However it seems to work on areas of the brain to quiet down signals related to eating.      Topiramate may make you:    >feel less interest in eating in between meals   >think less about food and eating   >find it easier to push the plate away   >find giving up pop easier    >have an easier time eating less    For some of our patients, the pills work right away. They feel and think quite differently about food. Other patients don't feel much of a change but find in fact they have lost weight! Like all weight loss medications, topiramate works best when you help it work.  This means:    1) Have less tempting high calorie (fattening) food around the house or office    2) Have lower calorie food (fruits, vegetables,low fat meats and dairy) for snacks    3) Eat out only one time or less each week.   4) Eat your meals at a table with the TV or computer off.    Side-effects. Topiramate is generally well tolerated. The main side-effects we see are:   Tingling in hands,feet, or face (usually not very troublesome)   Mental confusion and word finding trouble (about 10% of patients have this.)     Feeling sleepy or a bit dopey- this goes away  very soon after starting.    One of the dangers of topiramate is the possibility of birth defects--if you get pregnant when you are on it, there is the risk that your baby will be born with a cleft lip or palate.  If you are on topiramate and of child bearing age, you need to be on a reliable form of birth control or refrain from sexual intercourse.     Please refer to the pharmacy insert for more information on side-effects. Since many pharmacists are not familiar with the use of topiramate in weight loss, calling the clinic will get you the most accurate information on the use of this medication for weight loss.     In order to get refills of this or any medication we prescribe you must be seen in the medical weight mgmt clinic every 2-3 months. Please have your pharmacy fax a refill request to [phone].

## 2018-06-16 NOTE — LETTER
"2018       RE: Gloria Sun  [id number]  [facility] [address]     Dear Colleague,    Thank you for referring your patient, Gloria Sun, to the [facility] MEDICAL WEIGHT MANAGEMENT at [facility]. Please see a copy of my visit note below.    Return Medical Weight Management Note     Gloria Sun  MRN:  [id number]  :  1990  CINDI:  2018    Dear Susanne Morales,    I had the pleasure of seeing your patient Gloria Sun.  She is a 27 year old female who I am continuing to see for treatment of obesity related to:       3/9/2016   I have the following co-morbidities associated with obesity: Asthma, Anxiety, Back Pain     INTERVAL HISTORY:  S/P sleeve gastrectomy in 2012 with pre-weight 240, pito weight 160.     Had a baby 1 year ago and gained 60 lbs during pregnancy.     She has seen Dr Yañez in the past and took topiramate.      CURRENT WEIGHT:   233 lbs 4.8 oz    Wt Readings from Last 4 Encounters:   18 233 lb 4.8 oz   18 233 lb   18 230 lb   17 228 lb       Height:  5' 4\"  Body Mass Index:  Body mass index is 40.05 kg/(m^2).  Vitals:  /66 (BP Location: Left arm, Patient Position: Chair, Cuff Size: Adult Large)  Pulse (!) 47  Temp 98.4  F (36.9  C) (Oral)  Ht 5' 4\"  Wt 233 lb 4.8 oz  SpO2 100%  BMI 40.05 kg/m2    Initial consult weight was 240 prior to weight loss surgery.  Weight change since last seen is up 32 pounds.   Total loss is 7 pounds.    Diet and Activity Changes Since Last Visit Reviewed With Patient 2018   I have made the following changes to my diet since my last visit: n\a   With regards to my diet, I am still struggling with: sweets\bread   For breakfast, I typically eat: -   For lunch, I typically eat: -   For supper, I typically eat: -   For snack(s), I typically eat: -   I have made the following changes to my activity/exercise since my last visit: n\a   With regards to my " activity/exercise, I am still struggling with: lizzy to gym       MEDICATIONS:   Current Outpatient Prescriptions   Medication     Acetaminophen (TYLENOL PO)     albuterol (2.5 MG/3ML) 0.083% neb solution     albuterol (VENTOLIN HFA) 108 (90 BASE) MCG/ACT Inhaler     citalopram (CELEXA) 40 MG tablet     traZODone (DESYREL) 50 MG tablet     No current facility-administered medications for this visit.        Weight Loss Medication History Reviewed With Patient 6/16/2018   Which weight loss medications are you currently taking on a regular basis?  None   If you are not taking a weight loss medication that was prescribed to you, please indicate why: It did not seem to be helping me   Are you having any side effects from the weight loss medication that we have prescribed you? No       ASSESSMENT/PLAN:    27 y.o. Female with hx of sleeve gastrectomy with weight regain.    Restart topiramate ramp to 75 mg. She is aware of birth defects risk.  She is not sexually active and she is aware that she will need reliable birth control.  Start phentermine 15 mg    Start taking omeprazole to see if this helps with GERD symptoms. PPI helped when taking for 14 days in the past.    MEDICATION STARTED AT THIS APPOINTMENT    We are starting Phentermine. Take one tablet in the morning.  Call the nurse at [phone] if you have any questions or concerns. (Do not stop taking it if you don't think it's working. For some people it works without them knowing it.)    Phentermine is being prescribed because you identified hunger as one of the main causes for your extra weight.      Our patients on Phentermine find that they:    >feel less hunger    >find it easier to push the plate away   >have an easier time eating less    For some of our patients, these feelings are very real and immediate. For other patients, the feelings are less obvious. They don't feel much of a change but find they've lost weight. Like all weight loss medications,  Phentermine  works best when you help it work. This means:  1. Having less tempting high calorie (fattening) food around the house or office. (For people with strong cravings this is very important.)   2. Staying away from situations or people that may trigger your cravings .   3. Eating out only one time or less each week.  4. Eating your meals at a table with the TV or computer off.    Side-effects. Phentermine is generally well tolerated. The main side-effects we see are feelings of racing pulse or rapid heart beat. Some people can get an elevated blood pressure. Because of this we may have you come back within a week or so of starting the medication for a blood pressure check.         In order to get refills of this or any medication we prescribe you must be seen in the medical weight mgmt clinic every 2-3 months. Please have your pharmacy fax a refill request to [phone].    MEDICATION STARTED AT THIS APPOINTMENT  We are starting topiramate at bedtime.  Start one tab, 25 mg, for a week. Go up to 50 mg (2 tabs) for the next week. At the third week, take   3 tabs (75 mg).  Stay at 3 tabs until you are seen again. Call the nurse at [phone] if you have any questions or concerns. (Do not stop taking it if you don't think it's working. For some people it works even though they do not feel much different.)    Topiramate (Topamax) is a medication that is used most often to treat migraine headaches or for seizures. It has also been found to help with weight loss. Although it's not currently FDA approved for weight loss, it has been used safely for a number of years to help people who are carrying extra weight.     Just how topiramate helps with weight loss has not been exactly determined. However it seems to work on areas of the brain to quiet down signals related to eating.      Topiramate may make you:    >feel less interest in eating in between meals   >think less about food and eating   >find it easier to  push the plate away   >find giving up pop easier    >have an easier time eating less    For some of our patients, the pills work right away. They feel and think quite differently about food. Other patients don't feel much of a change but find in fact they have lost weight! Like all weight loss medications, topiramate works best when you help it work.  This means:    1) Have less tempting high calorie (fattening) food around the house or office    2) Have lower calorie food (fruits, vegetables,low fat meats and dairy) for snacks    3) Eat out only one time or less each week.   4) Eat your meals at a table with the TV or computer off.    Side-effects. Topiramate is generally well tolerated. The main side-effects we see are:   Tingling in hands,feet, or face (usually not very troublesome)   Mental confusion and word finding trouble (about 10% of patients have this.)     Feeling sleepy or a bit dopey- this goes away very soon after starting.    One of the dangers of topiramate is the possibility of birth defects--if you get pregnant when you are on it, there is the risk that your baby will be born with a cleft lip or palate.  If you are on topiramate and of child bearing age, you need to be on a reliable form of birth control or refrain from sexual intercourse.     Please refer to the pharmacy insert for more information on side-effects. Since many pharmacists are not familiar with the use of topiramate in weight loss, calling the clinic will get you the most accurate information on the use of this medication for weight loss.     In order to get refills of this or any medication we prescribe you must be seen in the medical weight mgmt clinic every 2-3 months. Please have your pharmacy fax a refill request to [phone].    FOLLOW-UP:    12 weeks.    Time: 30 min spent on evaluation, management, counseling, education, & motivational interviewing with greater than 50 % of the total time was spent on counseling and  coordinating care    Sincerely,  Paige Pelayo PA-C

## 2018-06-17 LAB — DEPRECATED CALCIDIOL+CALCIFEROL SERPL-MC: 25 UG/L (ref 20–75)

## 2018-06-21 LAB
ANNOTATION COMMENT IMP: NORMAL
RETINYL PALMITATE SERPL-MCNC: 0.02 MG/L (ref 0–0.1)
VIT A SERPL-MCNC: 0.39 MG/L (ref 0.3–1.2)

## 2018-08-10 ENCOUNTER — OFFICE VISIT (OUTPATIENT)
Dept: ENDOCRINOLOGY | Facility: CLINIC | Age: 28
End: 2018-08-10
Payer: COMMERCIAL

## 2018-08-10 VITALS
WEIGHT: 227.4 LBS | TEMPERATURE: 98.3 F | SYSTOLIC BLOOD PRESSURE: 118 MMHG | HEART RATE: 53 BPM | DIASTOLIC BLOOD PRESSURE: 67 MMHG | RESPIRATION RATE: 18 BRPM | OXYGEN SATURATION: 96 % | BODY MASS INDEX: 38.82 KG/M2 | HEIGHT: 64 IN

## 2018-08-10 DIAGNOSIS — E66.01 MORBID OBESITY (H): ICD-10-CM

## 2018-08-10 RX ORDER — TOPIRAMATE 25 MG/1
50 TABLET, FILM COATED ORAL 2 TIMES DAILY
Qty: 120 TABLET | Refills: 3 | Status: SHIPPED | OUTPATIENT
Start: 2018-08-10 | End: 2019-11-09

## 2018-08-10 RX ORDER — PHENTERMINE HYDROCHLORIDE 15 MG/1
15 CAPSULE ORAL EVERY MORNING
Qty: 30 CAPSULE | Refills: 3 | Status: SHIPPED | OUTPATIENT
Start: 2018-08-10 | End: 2019-11-09

## 2018-08-10 NOTE — MR AVS SNAPSHOT
"              After Visit Summary   8/10/2018    Gloria Sun    MRN: [id number]           Patient Information     Date Of Birth          1990        Visit Information        Provider Department      8/10/2018 10:30 AM Paige Pelayo PA-C [facility]        Today's Diagnoses     Morbid obesity (H)           Follow-ups after your visit        Who to contact     Please call your clinic at [phone] to:    Ask questions about your health    Make or cancel appointments    Discuss your medicines    Learn about your test results    Speak to your doctor            Additional Information About Your Visit        [employer] Information     [employer] gives you secure access to your electronic health record. If you see a primary care provider, you can also send messages to your care team and make appointments. If you have questions, please call your primary care clinic.  If you do not have a primary care provider, please call [phone] and they will assist you.      [employer] is an electronic gateway that provides easy, online access to your medical records. With [employer], you can request a clinic appointment, read your test results, renew a prescription or communicate with your care team.     To access your existing account, please contact your [facility] or call [phone] for assistance.        Care EveryWhere ID     This is your Care EveryWhere ID. This could be used by other organizations to access your [place] medical records  [id number]        Your Vitals Were     Pulse Temperature Respirations Height Pulse Oximetry BMI (Body Mass Index)    53 98.3  F (36.8  C) (Oral) 18 5' 4\" 96% 39.03 kg/m2       Blood Pressure from Last 3 Encounters:   08/10/18 118/67   06/16/18 116/66   03/28/18 96/52    Weight from Last 3 Encounters:   08/10/18 227 lb 6.4 oz   06/16/18 233 lb 4.8 oz   03/28/18 233 lb              Today, you had the following     No orders " found for display         Today's Medication Changes          These changes are accurate as of 8/10/18 11:03 AM.  If you have any questions, ask your nurse or doctor.               These medicines have changed or have updated prescriptions.        Dose/Directions    topiramate 25 MG tablet   Commonly known as:  TOPAMAX   This may have changed:    - how much to take  - how to take this  - when to take this  - additional instructions   Used for:  Morbid obesity (H)   Changed by:  Paige Pelayo PA-C        Dose:  50 mg   Take 2 tablets (50 mg) by mouth 2 times daily   Quantity:  120 tablet   Refills:  3            Where to get your medicines      These medications were sent to [id number] [place], [place] - [address] [facility]  [address] [place] [id number]     Phone:  [phone]     topiramate 25 MG tablet         Some of these will need a paper prescription and others can be bought over the counter.  Ask your nurse if you have questions.     Bring a paper prescription for each of these medications     phentermine 15 MG capsule                Primary Care Provider Office Phone # Fax #    Susanne Morales [phone] [address] [facility] [address]        Goals        General    Psychosocial (pt-stated)     Notes - Note edited  9/20/2016  1:05 PM by Shahrzad Hernandez BSW    As of today's date 9/20/2016 goal is met at 26 - 50%.   Goal Status:  Discontinued   SW unable to contact pt X3  As of today's date 6/23/2016 goal is met at 26 - 50%.   Goal Status:  Active   Pt's mother is continuing to work with county financial worker to get her 2 year old son onto Medical Assistance                                                                                  BOLA Gauthier          Equal Access to Services     YOHANA NAVARRO AH: Angela Hatfield, agnes dave, rickey cavazos . So [facility]  [phone].    ATENCIÓN: Si jane gilmore, tiene a deal disposición servicios gratuitos de asistencia lingüística. Michelle lucero [phone].    We comply with applicable federal civil rights laws and [place] laws. We do not discriminate on the basis of race, color, national origin, age, disability, sex, sexual orientation, or gender identity.            Thank you!     Thank you for choosing [facility] MEDICAL WE[facility] MANAGEMENT  for your care. Our goal is always to provide you with excellent care. Hearing back from our patients is one way we can continue to improve our services. Please take a few minutes to complete the written survey that you may receive in the mail after your visit with us. Thank you!             Your Updated Medication List - Protect others around you: Learn how to safely use, store and throw away your medicines at www.disposemymeds.org.          This list is accurate as of 8/10/18 11:03 AM.  Always use your most recent med list.                   Brand Name Dispense Instructions for use Diagnosis    * albuterol (2.5 MG/3ML) 0.083% neb solution     25 vial    Take 1 vial (2.5 mg) by nebulization every 6 hours as needed for shortness of breath / dyspnea or wheezing    Mild persistent asthma with acute exacerbation       * albuterol 108 (90 Base) MCG/ACT Inhaler    VENTOLIN HFA    3 Inhaler    INHALE 2 PUFFS INTO LUNG EVERY 4 HOURS AS NEEDED FOR SHORTNESS OF BREATH/DYSPNEA/WHEEZING    Intermittent asthma, uncomplicated       citalopram 40 MG tablet    celeXA    90 tablet    TAKE 1 TABLET (40 MG) BY MOUTH DAILY    Anxiety       omeprazole 40 MG capsule    priLOSEC    30 capsule    Take 1 capsule (40 mg) by mouth daily    Gastroesophageal reflux disease, esophagitis presence not specified       phentermine 15 MG capsule     30 capsule    Take 1 capsule (15 mg) by mouth every morning    Morbid obesity (H)       topiramate 25 MG tablet    TOPAMAX    120 tablet    Take 2 tablets (50 mg) by mouth 2 times  daily    Morbid obesity (H)       traZODone 50 MG tablet    DESYREL    60 tablet    TAKE 1-2 TABLETS BY MOUTH EVERY NIGHT AT BEDTIME AS NEEDED FOR SLEEP    Moderate recurrent major depression (H)       TYLENOL PO      Take by mouth as needed for mild pain or fever        * Notice:  This list has 2 medication(s) that are the same as other medications prescribed for you. Read the directions carefully, and ask your doctor or other care provider to review them with you.

## 2018-08-10 NOTE — PROGRESS NOTES
"    Return Medical Weight Management Note     Gloria Sun  MRN:  [id number]  :  1990  CINDI:  8/10/2018    Dear Susanne Morales,    I had the pleasure of seeing your patient Gloria Sun.  She is a 27 year old female who I am continuing to see for treatment of obesity related to:       3/9/2016   I have the following co-morbidities associated with obesity: Asthma, Anxiety, Back Pain     S/P sleeve gastrectomy in 2012 with pre-weight 240, pito weight 160.      Had a baby 1 year ago and gained 60 lbs during pregnancy.      She has seen Dr Yañez in the past and took topiramate.       INTERVAL HISTORY:    Last visit with me was 18 and she restarted topiramate and started phentermine.  She has lost 6 lbs.     CURRENT WEIGHT:   227 lbs 6.4 oz    Wt Readings from Last 4 Encounters:   08/10/18 227 lb 6.4 oz   18 233 lb 4.8 oz   18 233 lb   18 230 lb       Height:  5' 4\"  Body Mass Index:  Body mass index is 39.03 kg/(m^2).  Vitals:  /67 (BP Location: Left arm, Patient Position: Sitting, Cuff Size: Adult Large)  Pulse 53  Temp 98.3  F (36.8  C) (Oral)  Resp 18  Ht 5' 4\"  Wt 227 lb 6.4 oz  SpO2 96%  BMI 39.03 kg/m2      Initial consult weight was 240 prior to weight loss surgery.  Weight change since last seen on 2018 is down 6 pounds.   Total loss is 13 pounds.    Diet and Activity Changes Since Last Visit Reviewed With Patient 8/10/2018   I have made the following changes to my diet since my last visit: no pop intake   With regards to my diet, I am still struggling with: bread   For breakfast, I typically eat: -   For lunch, I typically eat: -   For supper, I typically eat: -   For snack(s), I typically eat: -   I have made the following changes to my activity/exercise since my last visit: walking daily   With regards to my activity/exercise, I am still struggling with: going to the gym       MEDICATIONS:   Current Outpatient Prescriptions   Medication     " Acetaminophen (TYLENOL PO)     albuterol (2.5 MG/3ML) 0.083% neb solution     albuterol (VENTOLIN HFA) 108 (90 BASE) MCG/ACT Inhaler     citalopram (CELEXA) 40 MG tablet     omeprazole (PRILOSEC) 40 MG capsule     phentermine 15 MG capsule     topiramate (TOPAMAX) 25 MG tablet     traZODone (DESYREL) 50 MG tablet     No current facility-administered medications for this visit.        Weight Loss Medication History Reviewed With Patient 8/10/2018   Which weight loss medications are you currently taking on a regular basis?  Phentermine, Topamax (topiramate)   If you are not taking a weight loss medication that was prescribed to you, please indicate why: -   Are you having any side effects from the weight loss medication that we have prescribed you? No       ASSESSMENT:     27 y.o. Female here for [facility] follow up for weight regain after sleeve gastrectomy.  She has lost 6 lbs since last visit and she is doing well on topiramate and phentermine. She is tolerating both meds.      PLAN:   Refill phentermine 15mg  Refill and increase topiramate to 50mg twice daily  See Paige in 3 months  Follow up with PCP for birth control options to help control periods  Recommend meal replacement product Bariatrix    FOLLOW-UP:    12 weeks.    Time: 15 min spent on evaluation, management, counseling, education, & motivational interviewing with greater than 50 % of the total time was spent on counseling and coordinating care    Sincerely,    Paige Pelayo PA-C

## 2018-08-10 NOTE — LETTER
"8/10/2018     RE: Gloria Sun  [id number]  [facility] [address]     Dear Colleague,    Thank you for referring your patient, Gloria Sun, to the [facility] MANAGEMENT at [facility]. Please see a copy of my visit note below.        Return Medical Weight Management Note     Gloria Sun  MRN:  [id number]  :  1990  CINDI:  8/10/2018    Dear Susanne Morales,    I had the pleasure of seeing your patient Gloria Sun.  She is a 27 year old female who I am continuing to see for treatment of obesity related to:       3/9/2016   I have the following co-morbidities associated with obesity: Asthma, Anxiety, Back Pain     S/P sleeve gastrectomy in 2012 with pre-weight 240, pito weight 160.      Had a baby 1 year ago and gained 60 lbs during pregnancy.      She has seen Dr Yañez in the past and took topiramate.       INTERVAL HISTORY:    Last visit with me was 18 and she restarted topiramate and started phentermine.  She has lost 6 lbs.     CURRENT WEIGHT:   227 lbs 6.4 oz    Wt Readings from Last 4 Encounters:   08/10/18 227 lb 6.4 oz   18 233 lb 4.8 oz   18 233 lb   18 230 lb       Height:  5' 4\"  Body Mass Index:  Body mass index is 39.03 kg/(m^2).  Vitals:  /67 (BP Location: Left arm, Patient Position: Sitting, Cuff Size: Adult Large)  Pulse 53  Temp 98.3  F (36.8  C) (Oral)  Resp 18  Ht 5' 4\"  Wt 227 lb 6.4 oz  SpO2 96%  BMI 39.03 kg/m2      Initial consult weight was 240 prior to weight loss surgery.  Weight change since last seen on 2018 is down 6 pounds.   Total loss is 13 pounds.    Diet and Activity Changes Since Last Visit Reviewed With Patient 8/10/2018   I have made the following changes to my diet since my last visit: no pop intake   With regards to my diet, I am still struggling with: bread   For breakfast, I typically eat: -   For lunch, I typically eat: -   For supper, I typically eat: " -   For snack(s), I typically eat: -   I have made the following changes to my activity/exercise since my last visit: walking daily   With regards to my activity/exercise, I am still struggling with: going to the gym       MEDICATIONS:   Current Outpatient Prescriptions   Medication     Acetaminophen (TYLENOL PO)     albuterol (2.5 MG/3ML) 0.083% neb solution     albuterol (VENTOLIN HFA) 108 (90 BASE) MCG/ACT Inhaler     citalopram (CELEXA) 40 MG tablet     omeprazole (PRILOSEC) 40 MG capsule     phentermine 15 MG capsule     topiramate (TOPAMAX) 25 MG tablet     traZODone (DESYREL) 50 MG tablet     No current facility-administered medications for this visit.        Weight Loss Medication History Reviewed With Patient 8/10/2018   Which weight loss medications are you currently taking on a regular basis?  Phentermine, Topamax (topiramate)   If you are not taking a weight loss medication that was prescribed to you, please indicate why: -   Are you having any side effects from the weight loss medication that we have prescribed you? No       ASSESSMENT:     27 y.o. Female here for [facility] follow up for weight regain after sleeve gastrectomy.  She has lost 6 lbs since last visit and she is doing well on topiramate and phentermine. She is tolerating both meds.      PLAN:   Refill phentermine 15mg  Refill and increase topiramate to 50mg twice daily  See Paige in 3 months  Follow up with PCP for birth control options to help control periods  Recommend meal replacement product Bariatrix    FOLLOW-UP:    12 weeks.    Time: 15 min spent on evaluation, management, counseling, education, & motivational interviewing with greater than 50 % of the total time was spent on counseling and coordinating care    Sincerely,    Paige Pelayo PA-C

## 2018-08-15 ENCOUNTER — OFFICE VISIT (OUTPATIENT)
Dept: FAMILY MEDICINE | Facility: CLINIC | Age: 28
End: 2018-08-15
Payer: COMMERCIAL

## 2018-08-15 VITALS
WEIGHT: 228 LBS | DIASTOLIC BLOOD PRESSURE: 62 MMHG | HEIGHT: 63 IN | BODY MASS INDEX: 40.4 KG/M2 | TEMPERATURE: 96.5 F | OXYGEN SATURATION: 98 % | RESPIRATION RATE: 16 BRPM | HEART RATE: 57 BPM | SYSTOLIC BLOOD PRESSURE: 98 MMHG

## 2018-08-15 DIAGNOSIS — F33.0 MAJOR DEPRESSIVE DISORDER, RECURRENT EPISODE, MILD (H): ICD-10-CM

## 2018-08-15 DIAGNOSIS — R87.610 ASCUS WITH POSITIVE HIGH RISK HPV CERVICAL: ICD-10-CM

## 2018-08-15 DIAGNOSIS — Z30.8 ENCOUNTER FOR OTHER CONTRACEPTIVE MANAGEMENT: ICD-10-CM

## 2018-08-15 DIAGNOSIS — R87.810 ASCUS WITH POSITIVE HIGH RISK HPV CERVICAL: ICD-10-CM

## 2018-08-15 DIAGNOSIS — G43.009 MIGRAINE WITHOUT AURA AND WITHOUT STATUS MIGRAINOSUS, NOT INTRACTABLE: ICD-10-CM

## 2018-08-15 DIAGNOSIS — Z00.00 ROUTINE HISTORY AND PHYSICAL EXAMINATION OF ADULT: Primary | ICD-10-CM

## 2018-08-15 DIAGNOSIS — F41.9 ANXIETY: ICD-10-CM

## 2018-08-15 DIAGNOSIS — E66.01 MORBID OBESITY (H): ICD-10-CM

## 2018-08-15 DIAGNOSIS — J45.20 INTERMITTENT ASTHMA, UNCOMPLICATED: ICD-10-CM

## 2018-08-15 DIAGNOSIS — Z11.3 SCREEN FOR STD (SEXUALLY TRANSMITTED DISEASE): ICD-10-CM

## 2018-08-15 LAB
BETA HCG QUAL IFA URINE: NEGATIVE
CHOLEST SERPL-MCNC: 165 MG/DL
HDLC SERPL-MCNC: 36 MG/DL
LDLC SERPL CALC-MCNC: 103 MG/DL
NONHDLC SERPL-MCNC: 129 MG/DL
TRIGL SERPL-MCNC: 131 MG/DL

## 2018-08-15 PROCEDURE — 96372 THER/PROPH/DIAG INJ SC/IM: CPT | Performed by: FAMILY MEDICINE

## 2018-08-15 PROCEDURE — G0476 HPV COMBO ASSAY CA SCREEN: HCPCS | Performed by: FAMILY MEDICINE

## 2018-08-15 PROCEDURE — 87591 N.GONORRHOEAE DNA AMP PROB: CPT | Performed by: FAMILY MEDICINE

## 2018-08-15 PROCEDURE — 87491 CHLMYD TRACH DNA AMP PROBE: CPT | Performed by: FAMILY MEDICINE

## 2018-08-15 PROCEDURE — 88175 CYTOPATH C/V AUTO FLUID REDO: CPT | Performed by: FAMILY MEDICINE

## 2018-08-15 PROCEDURE — 36415 COLL VENOUS BLD VENIPUNCTURE: CPT | Performed by: FAMILY MEDICINE

## 2018-08-15 PROCEDURE — 84703 CHORIONIC GONADOTROPIN ASSAY: CPT | Performed by: FAMILY MEDICINE

## 2018-08-15 PROCEDURE — 99395 PREV VISIT EST AGE 18-39: CPT | Mod: 25 | Performed by: FAMILY MEDICINE

## 2018-08-15 PROCEDURE — 80061 LIPID PANEL: CPT | Performed by: FAMILY MEDICINE

## 2018-08-15 RX ORDER — CITALOPRAM HYDROBROMIDE 40 MG/1
TABLET ORAL
Qty: 90 TABLET | Refills: 1 | Status: SHIPPED | OUTPATIENT
Start: 2018-08-15 | End: 2019-07-03

## 2018-08-15 RX ORDER — MEDROXYPROGESTERONE ACETATE 150 MG/ML
150 INJECTION, SUSPENSION INTRAMUSCULAR
Qty: 1 ML | Refills: 3 | COMMUNITY
Start: 2018-08-15 | End: 2021-04-21

## 2018-08-15 ASSESSMENT — ENCOUNTER SYMPTOMS
SHORTNESS OF BREATH: 1
ABDOMINAL PAIN: 0
BREAST MASS: 0
FREQUENCY: 0
DIZZINESS: 0
HEMATOCHEZIA: 0
WEAKNESS: 0
PALPITATIONS: 0
PARESTHESIAS: 0
HEMATURIA: 0
CONSTIPATION: 0
EYE PAIN: 0
HEADACHES: 1
NERVOUS/ANXIOUS: 0
HEARTBURN: 0
CHILLS: 0
ARTHRALGIAS: 0
SORE THROAT: 0
MYALGIAS: 0
FEVER: 0
NAUSEA: 0
DYSURIA: 0
DIARRHEA: 0
JOINT SWELLING: 0
COUGH: 0

## 2018-08-15 ASSESSMENT — PAIN SCALES - GENERAL: PAINLEVEL: NO PAIN (0)

## 2018-08-15 NOTE — PATIENT INSTRUCTIONS
Preventive Health Recommendations  Female Ages 26 - 39  Yearly exam:   See your health care provider every year in order to    Review health changes.     Discuss preventive care.      Review your medicines if you your doctor has prescribed any.    Until age 30: Get a Pap test every three years (more often if you have had an abnormal result).    After age 30: Talk to your doctor about whether you should have a Pap test every 3 years or have a Pap test with HPV screening every 5 years.   You do not need a Pap test if your uterus was removed (hysterectomy) and you have not had cancer.  You should be tested each year for STDs (sexually transmitted diseases), if you're at risk.   Talk to your provider about how often to have your cholesterol checked.  If you are at risk for diabetes, you should have a diabetes test (fasting glucose).  Shots: Get a flu shot each year. Get a tetanus shot every 10 years.   Nutrition:     Eat at least 5 servings of fruits and vegetables each day.    Eat whole-grain bread, whole-wheat pasta and brown rice instead of white grains and rice.    Get adequate Calcium and Vitamin D.     Lifestyle    Exercise at least 150 minutes a week (30 minutes a day, 5 days of the week). This will help you control your weight and prevent disease.    Limit alcohol to one drink per day.    No smoking.     Wear sunscreen to prevent skin cancer.    See your dentist every six months for an exam and cleaning.  [facility]    If you have any questions regarding to your visit please contact your care team:       Team Red:   Clinic Hours Telephone Number   Dr. Susanne Canales, NP   7am-7pm  Monday - Thursday   7am-5pm  Fridays  [phone]  (Appointment scheduling available 24/7)    Questions about your recent visit?   Team Line  [phone]   Urgent Care - [place] and [place] - 11am-9pm Monday-Friday Saturday-Sunday- 9am-5pm    [place] - 5pm-9pm Monday-Friday Saturday-Sunday- 9am-5pm  [phone] - [place]  [phone] - [place]       What options do I have for a visit other than an office visit? We offer electronic visits (e-visits) and telephone visits, when medically appropriate.  Please check with your medical insurance to see if these types of visits are covered, as you will be responsible for any charges that are not paid by your insurance.      You can use [employer] (secure electronic communication) to access to your chart, send your primary care provider a message, or make an appointment. Ask a team member how to get started.     For a price quote for your services, please call our Consumer Price Line at [phone] or our Imaging Cost estimation line at [phone] (for imaging tests).    Discharged by Tere ARREAGA CMA ([facility])

## 2018-08-15 NOTE — PROGRESS NOTES
Initial Depo Injection     Is the patient within 1st 5 days of a normal period?   No  Is the patient post delivery ?      No  If yes, is she breastfeeding?  No  If yes breastfeeding, shot given within 6 weeks after delivery?    No.  If no breastfeeding, shot given within 5 days of delivery?  No    BP Readings from Last 1 Encounters:   08/15/18 98/62     Patient's last menstrual period was 08/07/2018.    Date range to return is given to patient for her next dose: October, 31, 2018-November 1,2018     See Medication Note for administration information    Staff Sig: Tammy Dinero MA

## 2018-08-15 NOTE — MR AVS SNAPSHOT
After Visit Summary   8/15/2018    Gloria Sun    MRN: [id number]           Patient Information     Date Of Birth          1990        Visit Information        Provider Department      8/15/2018 9:20 AM Edda Pugh MD [facility]        Today's Diagnoses     Routine history and physical examination of adult    -  1    Screening for malignant neoplasm of cervix        Anxiety        ASCUS with positive high risk HPV cervical        Screen for STD (sexually transmitted disease)        Encounter for other contraceptive management        Migraine without aura and without status migrainosus, not intractable        Major depressive disorder, recurrent episode, mild (H)        Intermittent asthma, uncomplicated          Care Instructions      Preventive Health Recommendations  Female Ages 26 - 39  Yearly exam:   See your health care provider every year in order to    Review health changes.     Discuss preventive care.      Review your medicines if you your doctor has prescribed any.    Until age 30: Get a Pap test every three years (more often if you have had an abnormal result).    After age 30: Talk to your doctor about whether you should have a Pap test every 3 years or have a Pap test with HPV screening every 5 years.   You do not need a Pap test if your uterus was removed (hysterectomy) and you have not had cancer.  You should be tested each year for STDs (sexually transmitted diseases), if you're at risk.   Talk to your provider about how often to have your cholesterol checked.  If you are at risk for diabetes, you should have a diabetes test (fasting glucose).  Shots: Get a flu shot each year. Get a tetanus shot every 10 years.   Nutrition:     Eat at least 5 servings of fruits and vegetables each day.    Eat whole-grain bread, whole-wheat pasta and brown rice instead of white grains and rice.    Get adequate Calcium and Vitamin D.     Lifestyle    Exercise at least 150 minutes a  week (30 minutes a day, 5 days of the week). This will help you control your weight and prevent disease.    Limit alcohol to one drink per day.    No smoking.     Wear sunscreen to prevent skin cancer.    See your dentist every six months for an exam and cleaning.  [facility]    If you have any questions regarding to your visit please contact your care team:       Team Red:   Clinic Hours Telephone Number   Dr. Susanne Canales NP   7am-7pm  Monday - Thursday   7am-5pm  Fridays  [phone]  (Appointment scheduling available 24/7)    Questions about your recent visit?   Team Line  [phone]   Urgent Care - [place] and [facility] - 11am-9pm Monday-Friday Saturday-Sunday- 9am-5pm   [place] - 5pm-9pm Monday-Friday Saturday-Sunday- 9am-5pm  [phone] - [place]  [phone] - [place]       What options do I have for a visit other than an office visit? We offer electronic visits (e-visits) and telephone visits, when medically appropriate.  Please check with your medical insurance to see if these types of visits are covered, as you will be responsible for any charges that are not paid by your insurance.      You can use [employer] (secure electronic communication) to access to your chart, send your primary care provider a message, or make an appointment. Ask a team member how to get started.     For a price quote for your services, please call our Consumer Price Line at [phone] or our Imaging Cost estimation line at [phone] (for imaging tests).    Discharged by Tere ARREAGA CMA ([facility])            Follow-ups after your visit        Your next 10 appointments already scheduled     Dec 15, 2018 11:30 AM CST   (Arrive by 11:15 AM)   Return Weight Management Visit with Paige Pelayo PA-C   [facility] ([facility])    [address]  "[id number] [phone]              Who to contact     If you have questions or need follow up information about today's clinic visit or your schedule please contact [facility] [place] directly at [phone].  Normal or non-critical lab and imaging results will be communicated to you by MyChart, letter or phone within 4 business days after the clinic has received the results. If you do not hear from us within 7 days, please contact the clinic through [employer] or phone. If you have a critical or abnormal lab result, we will notify you by phone as soon as possible.  Submit refill requests through [employer] or call your pharmacy and they will forward the refill request to us. Please allow 3 business days for your refill to be completed.          Additional Information About Your Visit        [employer] Information     [employer] gives you secure access to your electronic health record. If you see a primary care provider, you can also send messages to your care team and make appointments. If you have questions, please call your primary care clinic.  If you do not have a primary care provider, please call [phone] and they will assist you.        Care EveryWhere ID     This is your Care EveryWhere ID. This could be used by other organizations to access your [facility] medical records  [id number]        Your Vitals Were     Pulse Temperature Respirations Height Last Period Pulse Oximetry    57 96.5  F (35.8  C) (Oral) 16 5' 2.6\" (1.59 m) 08/07/2018 98%    BMI (Body Mass Index)                   40.91 kg/m2            Blood Pressure from Last 3 Encounters:   08/15/18 98/62   08/10/18 118/67   06/16/18 116/66    Weight from Last 3 Encounters:   08/15/18 228 lb (103.4 kg)   08/10/18 227 lb 6.4 oz (103.1 kg)   06/16/18 233 lb 4.8 oz (105.8 kg)              We Performed the Following     Beta HCG Qual, Urine - [facility] and [place] ([id number])     CHLAMYDIA TRACHOMATIS PCR     HPV High Risk Types DNA Cervical     Lipid panel " reflex to direct LDL Fasting     NEISSERIA GONORRHOEA PCR     Pap imaged thin layer diagnostic with HPV (select HPV order below)          Where to get your medicines      These medications were sent to [id number] - [place], [place] - [address] [place] [address]     Phone:  [phone]     citalopram 40 MG tablet          Primary Care Provider Office Phone # Fax #    Susanne Morales [phone] [address] [facility] [address]        Goals        General    Psychosocial (pt-stated)     Notes - Note edited  9/20/2016  1:05 PM by Shahrzad Hernandez, BSW    As of today's date 9/20/2016 goal is met at 26 - 50%.   Goal Status:  Discontinued   SW unable to contact pt X3  As of today's date 6/23/2016 goal is met at 26 - 50%.   Goal Status:  Active   Pt's mother is continuing to work with county financial worker to get her 2 year old son onto Medical Assistance                                                                                  BOLA Gauthier          Equal Access to Services     [facility]: Hadii aad ku hadasho Soomaali, waaxda luqadaha, qaybta kaalmada adeegyada, waxay adalberto haysirena salmon . So [facility] [phone].    ATENCIÓN: Si habla español, tiene a deal disposición servicios gratuitos de asistencia lingüística. Llame al [phone].    We comply with applicable federal civil rights laws and [place] laws. We do not discriminate on the basis of race, color, national origin, age, disability, sex, sexual orientation, or gender identity.            Thank you!     Thank you for choosing [facility]  for your care. Our goal is always to provide you with excellent care. Hearing back from our patients is one way we can continue to improve our services. Please take a few minutes to complete the written survey that you may receive in the mail after your visit with us. Thank you!             Your Updated Medication List  - Protect others around you: Learn how to safely use, store and throw away your medicines at www.disposemymeds.org.          This list is accurate as of 8/15/18  9:50 AM.  Always use your most recent med list.                   Brand Name Dispense Instructions for use Diagnosis    * albuterol (2.5 MG/3ML) 0.083% neb solution     25 vial    Take 1 vial (2.5 mg) by nebulization every 6 hours as needed for shortness of breath / dyspnea or wheezing    Mild persistent asthma with acute exacerbation       * albuterol 108 (90 Base) MCG/ACT inhaler    VENTOLIN HFA    3 Inhaler    INHALE 2 PUFFS INTO LUNG EVERY 4 HOURS AS NEEDED FOR SHORTNESS OF BREATH/DYSPNEA/WHEEZING    Intermittent asthma, uncomplicated       citalopram 40 MG tablet    celeXA    90 tablet    TAKE 1 TABLET (40 MG) BY MOUTH DAILY    Anxiety       omeprazole 40 MG capsule    priLOSEC    30 capsule    Take 1 capsule (40 mg) by mouth daily    Gastroesophageal reflux disease, esophagitis presence not specified       phentermine 15 MG capsule     30 capsule    Take 1 capsule (15 mg) by mouth every morning    Morbid obesity (H)       topiramate 25 MG tablet    TOPAMAX    120 tablet    Take 2 tablets (50 mg) by mouth 2 times daily    Morbid obesity (H)       traZODone 50 MG tablet    DESYREL    60 tablet    TAKE 1-2 TABLETS BY MOUTH EVERY NIGHT AT BEDTIME AS NEEDED FOR SLEEP    Moderate recurrent major depression (H)       TYLENOL PO      Take by mouth as needed for mild pain or fever        * Notice:  This list has 2 medication(s) that are the same as other medications prescribed for you. Read the directions carefully, and ask your doctor or other care provider to review them with you.
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
## 2018-08-16 LAB
C TRACH DNA SPEC QL NAA+PROBE: NEGATIVE
N GONORRHOEA DNA SPEC QL NAA+PROBE: NEGATIVE
SPECIMEN SOURCE: NORMAL
SPECIMEN SOURCE: NORMAL

## 2018-08-16 ASSESSMENT — ASTHMA QUESTIONNAIRES: ACT_TOTALSCORE: 20

## 2018-08-16 ASSESSMENT — PATIENT HEALTH QUESTIONNAIRE - PHQ9: SUM OF ALL RESPONSES TO PHQ QUESTIONS 1-9: 1

## 2018-08-17 LAB
COPATH REPORT: NORMAL
PAP: NORMAL

## 2018-08-20 LAB
FINAL DIAGNOSIS: NORMAL
HPV HR 12 DNA CVX QL NAA+PROBE: NEGATIVE
HPV16 DNA SPEC QL NAA+PROBE: NEGATIVE
HPV18 DNA SPEC QL NAA+PROBE: NEGATIVE
SPECIMEN DESCRIPTION: NORMAL
SPECIMEN SOURCE CVX/VAG CYTO: NORMAL

## 2018-09-27 ENCOUNTER — OFFICE VISIT (OUTPATIENT)
Dept: FAMILY MEDICINE | Facility: CLINIC | Age: 28
End: 2018-09-27
Payer: COMMERCIAL

## 2018-09-27 VITALS
RESPIRATION RATE: 16 BRPM | HEART RATE: 84 BPM | DIASTOLIC BLOOD PRESSURE: 60 MMHG | SYSTOLIC BLOOD PRESSURE: 110 MMHG | WEIGHT: 223 LBS | OXYGEN SATURATION: 96 % | TEMPERATURE: 98.9 F | BODY MASS INDEX: 39.51 KG/M2 | HEIGHT: 63 IN

## 2018-09-27 DIAGNOSIS — J45.20 INTERMITTENT ASTHMA, UNCOMPLICATED: ICD-10-CM

## 2018-09-27 DIAGNOSIS — R07.0 THROAT PAIN: Primary | ICD-10-CM

## 2018-09-27 DIAGNOSIS — J06.9 UPPER RESPIRATORY TRACT INFECTION, UNSPECIFIED TYPE: ICD-10-CM

## 2018-09-27 LAB
DEPRECATED S PYO AG THROAT QL EIA: NORMAL
SPECIMEN SOURCE: NORMAL

## 2018-09-27 PROCEDURE — 99213 OFFICE O/P EST LOW 20 MIN: CPT | Performed by: FAMILY MEDICINE

## 2018-09-27 PROCEDURE — 87081 CULTURE SCREEN ONLY: CPT | Performed by: FAMILY MEDICINE

## 2018-09-27 PROCEDURE — 87880 STREP A ASSAY W/OPTIC: CPT | Performed by: FAMILY MEDICINE

## 2018-09-27 RX ORDER — ALBUTEROL SULFATE 90 UG/1
AEROSOL, METERED RESPIRATORY (INHALATION)
Qty: 3 INHALER | Refills: 3 | Status: SHIPPED | OUTPATIENT
Start: 2018-09-27 | End: 2020-07-20

## 2018-09-27 RX ORDER — ALBUTEROL SULFATE 0.83 MG/ML
2.5 SOLUTION RESPIRATORY (INHALATION) EVERY 6 HOURS PRN
Qty: 25 VIAL | Refills: 0 | Status: SHIPPED | OUTPATIENT
Start: 2018-09-27 | End: 2019-07-28

## 2018-09-27 RX ORDER — BENZONATATE 200 MG/1
200 CAPSULE ORAL 3 TIMES DAILY PRN
Qty: 21 CAPSULE | Refills: 0 | Status: SHIPPED | OUTPATIENT
Start: 2018-09-27 | End: 2018-10-09

## 2018-09-27 NOTE — MR AVS SNAPSHOT
After Visit Summary   9/27/2018    Gloria Sun    MRN: [id number]           Patient Information     Date Of Birth          1990        Visit Information        Provider Department      9/27/2018 7:40 AM Edda Pugh MD [facility]        Today's Diagnoses     Throat pain    -  1    Mild persistent asthma with acute exacerbation        Intermittent asthma, uncomplicated        Upper respiratory tract infection, unspecified type          Care Instructions    [facility]    If you have any questions regarding to your visit please contact your care team:       Team Red:   Clinic Hours Telephone Number   Dr. Susanne Canales, NP 7am-7pm  Monday - Thursday   7am-5pm  Fridays  [phone]  (Appointment scheduling available 24/7)   Urgent Care - [place] and [facility] - 11am-9pm Monday-Friday Saturday-Sunday- 9am-5pm   [place] - 5pm-9pm Monday-Friday Saturday-Sunday- 9am-5pm  [phone] - [place]  [phone] - [place]       What options do I have for a visit other than an office visit? We offer electronic visits (e-visits) and telephone visits, when medically appropriate.  Please check with your medical insurance to see if these types of visits are covered, as you will be responsible for any charges that are not paid by your insurance.      You can use [email or web address] (secure electronic communication) to access to your chart, send your primary care provider a message, or make an appointment. Ask a team member how to get started.     For a price quote for your services, please call our Consumer Price Line at [phone] or our Imaging Cost estimation line at [phone] (for imaging tests).  Upper respiratory infections are usually caused by viruses and, sometimes certain bacteria.  Antibiotics don't help the vast majority of people recover any quicker even when caused by a bacteria.  The body will fight  this infection but it needs to be treated well in order to help heal itself.  Rest as needed.  It is ok to reduce food intake if appetite is poor but it is quite important to maintain/increase fluid intake.    For cough, dextromethorphan/guaifenesin combinations help loosen secretions and suppress cough safely without significant risk of sedation. Often 2 puffs four times daily of an albuterol inhaler will help with bronchitis.  This is a prescription medicine.    For nasal congestion and sinus pressure, pseudoephedrine (Sudafed) or phenylephrine is often helpful but it can cause elevations in blood pressure and insomnia.  Short courses of a nasal decongestant spray (Afrin or Neosinephrine) can be appropriate but their use should be restricted to 3 days due to the high risk of nasal addiction.    For pain and fevers, acetaminophen (Tylenol) is most appropriate.  Ibuprofen (Advil) or naproxen (Aleve) are useful too and last longer but they can cause elevation of blood pressure or stomach problems.    Antihistamines (Benadryl, Dimetapp, etc.) cause sedation, confusion, bowel and urinary abnormalities and are of little use for infectious causes of cough and nasal congestion.  Their use should be reserved for allergic symptoms.    PAIGE Velez CMA ([facility])          Follow-ups after your visit        Your next 10 appointments already scheduled     Dec 15, 2018 11:30 AM CST   (Arrive by 11:15 AM)   Return Weight Management Visit with Paige Pelayo PA-C   [facility] ([facility])    [address] [facility] [place] [facility] [address] [phone]              Who to contact     If you have questions or need follow up information about today's clinic visit or your schedule please contact [facility] [place] directly at [phone].  Normal or non-critical lab and imaging results will be communicated to you by MyChart, letter or phone within 4 business  "days after the clinic has received the results. If you do not hear from us within 7 days, please contact the clinic through [employer] or phone. If you have a critical or abnormal lab result, we will notify you by phone as soon as possible.  Submit refill requests through [employer] or call your pharmacy and they will forward the refill request to us. Please allow 3 business days for your refill to be completed.          Additional Information About Your Visit        [employer] Information     [employer] gives you secure access to your electronic health record. If you see a primary care provider, you can also send messages to your care team and make appointments. If you have questions, please call your primary care clinic.  If you do not have a primary care provider, please call [phone] and they will assist you.        Care EveryWhere ID     This is your Care EveryWhere ID. This could be used by other organizations to access your [place] medical records  [id number]        Your Vitals Were     Pulse Temperature Respirations Height Last Period Pulse Oximetry    84 98.9  F (37.2  C) (Oral) 16 5' 2.5\" (1.588 m) (LMP Unknown) 96%    Breastfeeding? BMI (Body Mass Index)                No 40.14 kg/m2           Blood Pressure from Last 3 Encounters:   09/27/18 110/60   08/15/18 98/62   08/10/18 118/67    Weight from Last 3 Encounters:   09/27/18 223 lb (101.2 kg)   08/15/18 228 lb (103.4 kg)   08/10/18 227 lb 6.4 oz (103.1 kg)              We Performed the Following     Beta strep group A culture     Strep, Rapid Screen          Today's Medication Changes          These changes are accurate as of 9/27/18  8:08 AM.  If you have any questions, ask your nurse or doctor.               Start taking these medicines.        Dose/Directions    benzonatate 200 MG capsule   Commonly known as:  TESSALON   Used for:  Upper respiratory tract infection, unspecified type   Started by:  Edda Pugh MD        Dose:  200 mg   Take 1 capsule " (200 mg) by mouth 3 times daily as needed for cough   Quantity:  21 capsule   Refills:  0            Where to get your medicines      [id number] [place] [place] - [address]  [address] [facility] [address] [facility] [place] [address]     Phone:  [phone]     albuterol (2.5 MG/3ML) 0.083% neb solution    albuterol 108 (90 Base) MCG/ACT inhaler    benzonatate 200 MG capsule                Primary Care Provider Office Phone # Fax #    Edda Pugh [phone] [address] [facility] [address]        Goals        General    Psychosocial (pt-stated)     Notes - Note edited  9/20/2016  1:05 PM by Shahrzad Hernandez BSW    As of today's date 9/20/2016 goal is met at 26 - 50%.   Goal Status:  Discontinued   SW unable to contact pt X3  As of today's date 6/23/2016 goal is met at 26 - 50%.   Goal Status:  Active   Pt's mother is continuing to work with county financial worker to get her 2 year old son onto Medical Assistance                                                                                  DEVORA GauthierW          Equal Access to Services     [facility] AH: Hadii aad ku hadasho Somannieali, waaxda luqadaha, qaybta kaalmada adeegyada, rickey salmon . So [facility] [phone].    ATENCIÓN: Si habla español, tiene a deal disposición servicios gratuitos de asistencia lingüística. Llame al [phone].    We comply with applicable federal civil rights laws and [place] laws. We do not discriminate on the basis of race, color, national origin, age, disability, sex, sexual orientation, or gender identity.            Thank you!     Thank you for choosing [facility]  for your care. Our goal is always to provide you with excellent care. Hearing back from our patients is one way we can continue to improve our services. Please take a few minutes to complete the written survey that you may receive in the mail after your visit  with us. Thank you!             Your Updated Medication List - Protect others around you: Learn how to safely use, store and throw away your medicines at www.disposemymeds.org.          This list is accurate as of 9/27/18  8:08 AM.  Always use your most recent med list.                   Brand Name Dispense Instructions for use Diagnosis    * albuterol (2.5 MG/3ML) 0.083% neb solution     25 vial    Take 1 vial (2.5 mg) by nebulization every 6 hours as needed for shortness of breath / dyspnea or wheezing    Mild persistent asthma with acute exacerbation       * albuterol 108 (90 Base) MCG/ACT inhaler    VENTOLIN HFA    3 Inhaler    INHALE 2 PUFFS INTO LUNG EVERY 4 HOURS AS NEEDED FOR SHORTNESS OF BREATH/DYSPNEA/WHEEZING    Intermittent asthma, uncomplicated       benzonatate 200 MG capsule    TESSALON    21 capsule    Take 1 capsule (200 mg) by mouth 3 times daily as needed for cough    Upper respiratory tract infection, unspecified type       citalopram 40 MG tablet    celeXA    90 tablet    TAKE 1 TABLET (40 MG) BY MOUTH DAILY    Anxiety       medroxyPROGESTERone 150 MG/ML injection    DEPO-PROVERA    1 mL    Inject 1 mL (150 mg) into the muscle    Encounter for other contraceptive management       omeprazole 40 MG capsule    priLOSEC    30 capsule    Take 1 capsule (40 mg) by mouth daily    Gastroesophageal reflux disease, esophagitis presence not specified       phentermine 15 MG capsule     30 capsule    Take 1 capsule (15 mg) by mouth every morning    Morbid obesity (H)       topiramate 25 MG tablet    TOPAMAX    120 tablet    Take 2 tablets (50 mg) by mouth 2 times daily    Morbid obesity (H)       traZODone 50 MG tablet    DESYREL    60 tablet    TAKE 1-2 TABLETS BY MOUTH EVERY NIGHT AT BEDTIME AS NEEDED FOR SLEEP    Moderate recurrent major depression (H)       TYLENOL PO      Take by mouth as needed for mild pain or fever        * Notice:  This list has 2 medication(s) that are the same as other  medications prescribed for you. Read the directions carefully, and ask your doctor or other care provider to review them with you.

## 2018-09-27 NOTE — PROGRESS NOTES
SUBJECTIVE:   Gloria Sun is a 27 year old female who presents to clinic today for the following health issues:      Acute Illness   Acute illness concerns: Cough, Sweats  Onset: Started Monday this week    Fever: no     Chills/Sweats: YES    Headache (location?): YES    Sinus Pressure:no    Conjunctivitis:  no    Ear Pain: YES: both, pressure     Rhinorrhea: no     Congestion: no     Sore Throat: YES     Cough: YES-non-productive-    Wheeze: yes    Decreased Appetite: YES    Nausea: no     Vomiting: YES last week Tuesday-None since then    Diarrhea:  no     Dysuria/Freq.: no     Fatigue/Achiness: YES    Sick/Strep Exposure: no      Therapies Tried and outcome: Tylenol    1.) Patient would like to have Benzonate 200mg for cough    Problem list and histories reviewed & adjusted, as indicated.  Additional history: as documented    Patient Active Problem List   Diagnosis     CARDIOVASCULAR SCREENING; LDL GOAL LESS THAN 160     Chronic jaw pain     Status post gastric banding     Intermittent asthma, uncomplicated     Health Care Home     ASCUS with positive high risk HPV cervical     Morbid obesity (H)     Major depressive disorder, recurrent episode, mild (H)     Migraines     Past Surgical History:   Procedure Laterality Date     ESOPHAGOSCOPY, GASTROSCOPY, DUODENOSCOPY (EGD), COMBINED Left 12/31/2014    Procedure: COMBINED ESOPHAGOSCOPY, GASTROSCOPY, DUODENOSCOPY (EGD), BIOPSY SINGLE OR MULTIPLE;  Surgeon: Ilan Marrero MD;  Location:  GI     LAPAROSCOPIC CHOLECYSTECTOMY  2/21/2014    Procedure: LAPAROSCOPIC CHOLECYSTECTOMY;  Laparoscopic Cholecystectomy;  Surgeon: Ilan Marrero MD;  Location:  OR     LAPAROSCOPIC GASTRIC SLEEVE  1/21/2013    Procedure: LAPAROSCOPIC GASTRIC SLEEVE;  Laparoscopic Sleeve Gastrectomy;  Surgeon: Chato Mathews MD;  Location:  OR     MOUTH SURGERY  2009       Social History   Substance Use Topics     Smoking status: Never Smoker     Smokeless tobacco:  Never Used     Alcohol use No     Family History   Problem Relation Age of Onset     Lipids Mother      Anxiety Disorder Mother      Depression Mother      Hypertension Mother      Obesity Mother      Cancer Father      skin     Diabetes Father      Hypertension Father      Obesity Father      Hypertension Maternal Grandmother      C.A.D. Maternal Grandmother      CABG     Lipids Maternal Grandmother      Depression Maternal Grandmother      Obesity Maternal Grandmother      C.A.D. Maternal Grandfather      CABG, MI      Asthma Maternal Grandfather      Cancer Maternal Grandfather      Respiratory Maternal Grandfather      Lipids Maternal Grandfather      Hypertension Maternal Grandfather      Alzheimer Disease Maternal Grandfather      Depression Maternal Grandfather      Obesity Maternal Grandfather      Cerebrovascular Disease Paternal Grandmother      Arthritis Paternal Grandmother      d. lupus     Obesity Paternal Grandmother      HEART DISEASE Paternal Grandfather      Lipids Paternal Grandfather      Thyroid Disease No family hx of      Glaucoma No family hx of      Macular Degeneration No family hx of          Current Outpatient Prescriptions   Medication Sig Dispense Refill     Acetaminophen (TYLENOL PO) Take by mouth as needed for mild pain or fever       albuterol (2.5 MG/3ML) 0.083% neb solution Take 1 vial (2.5 mg) by nebulization every 6 hours as needed for shortness of breath / dyspnea or wheezing 25 vial 0     albuterol (VENTOLIN HFA) 108 (90 Base) MCG/ACT inhaler INHALE 2 PUFFS INTO LUNG EVERY 4 HOURS AS NEEDED FOR SHORTNESS OF BREATH/DYSPNEA/WHEEZING 3 Inhaler 3     benzonatate (TESSALON) 200 MG capsule Take 1 capsule (200 mg) by mouth 3 times daily as needed for cough 21 capsule 0     citalopram (CELEXA) 40 MG tablet TAKE 1 TABLET (40 MG) BY MOUTH DAILY 90 tablet 1     medroxyPROGESTERone (DEPO-PROVERA) 150 MG/ML injection Inject 1 mL (150 mg) into the muscle 1 mL 3     omeprazole (PRILOSEC) 40  "MG capsule Take 1 capsule (40 mg) by mouth daily 30 capsule 5     topiramate (TOPAMAX) 25 MG tablet Take 2 tablets (50 mg) by mouth 2 times daily 120 tablet 3     traZODone (DESYREL) 50 MG tablet TAKE 1-2 TABLETS BY MOUTH EVERY NIGHT AT BEDTIME AS NEEDED FOR SLEEP 60 tablet 5     phentermine 15 MG capsule Take 1 capsule (15 mg) by mouth every morning (Patient not taking: Reported on 9/27/2018) 30 capsule 3     [DISCONTINUED] albuterol (2.5 MG/3ML) 0.083% neb solution Take 1 vial (2.5 mg) by nebulization every 6 hours as needed for shortness of breath / dyspnea or wheezing 25 vial 0     [DISCONTINUED] albuterol (VENTOLIN HFA) 108 (90 BASE) MCG/ACT Inhaler INHALE 2 PUFFS INTO LUNG EVERY 4 HOURS AS NEEDED FOR SHORTNESS OF BREATH/DYSPNEA/WHEEZING 3 Inhaler 3     Allergies   Allergen Reactions     Codeine Nausea and Vomiting     Desogen [Apri]      Hot flashes     Desogestrel-Ethinyl Estradiol Nausea     Dust Mites      Fluoxetine      Irritable, easy bruising     Magnesium Sulfate Injection      Burning, hotflashes     Medroxyprogesterone Unknown     Swelling at injection site     Morphine      \"Weight loss surgery so it burns the inside of my stomach\"     Venlafaxine      Agitation, anxiety      Recent Labs   Lab Test  08/15/18   0954  06/16/18   1151  03/28/16   1045  11/04/15   1324  12/29/14   1218   02/20/14   1742   07/18/13   1507   02/27/13   1102   A1C   --    --    --    --   5.1   --   5.4   --   4.7   < >   --    LDL  103*   --    --    --   68   --   133*   < >   --    < >   --    HDL  36*   --    --    --   32*   --   41*   < >   --    < >   --    TRIG  131   --    --    --   103   --   167*   < >   --    < >   --    ALT   --   18   --   16  165*   < >  144*   < >  21   < >   --    CR   --   0.87  0.71  0.70   --    < >  0.87   < >  0.49*   < >   --    GFRESTIMATED   --   78  >90  Non  GFR Calc    >90  Non  GFR Calc     --    < >  81   < >  >90   < >   --    GFRESTBLACK   -- " "  >90  >90  African American GFR Calc    >90   GFR Calc     --    < >  >90   < >  >90   < >   --    POTASSIUM   --   3.9  3.8  3.9   --    < >  3.2*   < >  3.7   < >   --    TSH   --    --    --    --    --    --    --    --    --    --   1.11    < > = values in this interval not displayed.      BP Readings from Last 3 Encounters:   09/27/18 110/60   08/15/18 98/62   08/10/18 118/67    Wt Readings from Last 3 Encounters:   09/27/18 223 lb (101.2 kg)   08/15/18 228 lb (103.4 kg)   08/10/18 227 lb 6.4 oz (103.1 kg)                  Labs reviewed in EPIC    Reviewed and updated as needed this visit by clinical staff       Reviewed and updated as needed this visit by Provider         ROS:  CONSTITUTIONAL: NEGATIVE for fever, chills, change in weight  INTEGUMENTARY/SKIN: NEGATIVE for worrisome rashes, moles or lesions  ENT/MOUTH: nasal congestion  RESP:cough nonprodutive ,no sob  CV: NEGATIVE for chest pain, palpitations or peripheral edema  GI: NEGATIVE for nausea, abdominal pain, heartburn, or change in bowel habits  MUSCULOSKELETAL: NEGATIVE for significant arthralgias or myalgia    OBJECTIVE:     /60  Pulse 84  Temp 98.9  F (37.2  C) (Oral)  Resp 16  Ht 5' 2.5\" (1.588 m)  Wt 223 lb (101.2 kg)  LMP  (LMP Unknown)  SpO2 96%  Breastfeeding? No  BMI 40.14 kg/m2  Body mass index is 40.14 kg/(m^2).  GENERAL: healthy, alert and no distress  EYES: Eyes grossly normal to inspection, PERRL and conjunctivae and sclerae normal  HENT: ear canals and TM's normal, nose and mouth without ulcers or lesions  NECK: no adenopathy, no asymmetry, masses, or scars and thyroid normal to palpation  RESP: lungs clear to auscultation - no rales, rhonchi or wheezes  CV: regular rate and rhythm, normal S1 S2, no S3 or S4, no murmur, click or rub, no peripheral edema and peripheral pulses strong  ABDOMEN: soft, nontender, no hepatosplenomegaly, no masses and bowel sounds normal  MS: no gross musculoskeletal defects " noted, no edema    Diagnostic Test Results:  Results for orders placed or performed in visit on 09/27/18 (from the past 24 hour(s))   Strep, Rapid Screen   Result Value Ref Range    Specimen Description Throat     Rapid Strep A Screen       NEGATIVE: No Group A streptococcal antigen detected by immunoassay, await culture report.       ASSESSMENT/PLAN:         ICD-10-CM    1. Throat pain R07.0 Strep, Rapid Screen     Beta strep group A culture   2. Upper respiratory tract infection, unspecified type J06.9 benzonatate (TESSALON) 200 MG capsule   3. Intermittent asthma, uncomplicated J45.20 albuterol (2.5 MG/3ML) 0.083% neb solution     albuterol (VENTOLIN HFA) 108 (90 Base) MCG/ACT inhaler     Advised symptomatic Treatment  Rest/fluids  Follow up 1 week if not better/sooner if worse    Edda Pugh MD  [facility]

## 2018-09-28 LAB
BACTERIA SPEC CULT: NORMAL
SPECIMEN SOURCE: NORMAL

## 2018-10-09 ENCOUNTER — TELEPHONE (OUTPATIENT)
Dept: FAMILY MEDICINE | Facility: CLINIC | Age: 28
End: 2018-10-09

## 2018-10-09 DIAGNOSIS — J06.9 UPPER RESPIRATORY TRACT INFECTION, UNSPECIFIED TYPE: ICD-10-CM

## 2018-10-09 RX ORDER — BENZONATATE 200 MG/1
200 CAPSULE ORAL 3 TIMES DAILY PRN
Qty: 21 CAPSULE | Refills: 0 | Status: SHIPPED | OUTPATIENT
Start: 2018-10-09 | End: 2019-06-18

## 2018-10-09 NOTE — TELEPHONE ENCOUNTER
Reason for Call:  Medication or medication refill:    Do you use a [facility]?  Name of the pharmacy and phone number for the [facility] request[facility] [place] [phone]    Name of the medication requested: benzonatate (TESSALON) 200 MG capsule    Other request: Pharmacy is telling the patient they never received this Rx. Patient asks that it be resent.    Can we leave a detailed message on this number? YES    Phone number patient can be reached at: Cell number on file:    Telephone Information:   Mobile [phone]       Best Time: ASAP  Call taken on 10/9/2018 at 7:57 AM by Alix Hutchinson

## 2018-10-09 NOTE — TELEPHONE ENCOUNTER
Spoke with pharmacy and confirmed they did not receive prescription that was sent on 9/27/18.  Prescription resent per protocol.  Left detailed message advising that prescription was resent to pharmacy and to call back RN hotline with any further questions.   Nola Field RN

## 2018-11-16 ENCOUNTER — ALLIED HEALTH/NURSE VISIT (OUTPATIENT)
Dept: NURSING | Facility: CLINIC | Age: 28
End: 2018-11-16
Payer: COMMERCIAL

## 2018-11-16 LAB — BETA HCG QUAL IFA URINE: NEGATIVE

## 2018-11-16 PROCEDURE — 99207 ZZC NO CHARGE NURSE ONLY: CPT

## 2018-11-16 PROCEDURE — 84703 CHORIONIC GONADOTROPIN ASSAY: CPT | Performed by: FAMILY MEDICINE

## 2018-11-16 PROCEDURE — 96372 THER/PROPH/DIAG INJ SC/IM: CPT

## 2018-11-16 NOTE — NURSING NOTE
BP: Data Unavailable    LAST PAP/EXAM: Lab Results       Component                Value               Date                       PAP                      NIL                 08/15/2018            URINE HCG:negative    The following medication was given:     MEDICATION: Depo Provera 150mg  ROUTE: IM  SITE: Ventrogluteal - Right  : [employer]  LOT #: [id number]  EXP:05/2020  NEXT INJECTION DUE: 2/1/19 - 2/15/19   LS

## 2018-11-16 NOTE — MR AVS SNAPSHOT
After Visit Summary   11/16/2018    Gloria Sun    MRN: [id number]           Patient Information     Date Of Birth          1990        Visit Information        Provider Department      11/16/2018 1:20 PM [facility]        Today's Diagnoses     Contraception    -  1       Follow-ups after your visit        Your next 10 appointments already scheduled     Dec 15, 2018 11:30 AM CST   (Arrive by 11:15 AM)   Return Weight Management Visit with Paige Pelayo PA-C   [facility] ([facility] [place])    [address] [place] [id number] [phone]              Who to contact     If you have questions or need follow up information about today's clinic visit or your schedule please contact [facility] directly at [phone].  Normal or non-critical lab and imaging results will be communicated to you by MyChart, letter or phone within 4 business days after the clinic has received the results. If you do not hear from us within 7 days, please contact the clinic through MyChart or phone. If you have a critical or abnormal lab result, we will notify you by phone as soon as possible.  Submit refill requests through [employer] or call your pharmacy and they will forward the refill request to us. Please allow 3 business days for your refill to be completed.          Additional Information About Your Visit        MyChart Information     [employer] gives you secure access to your electronic health record. If you see a primary care provider, you can also send messages to your care team and make appointments. If you have questions, please call your primary care clinic.  If you do not have a primary care provider, please call [phone] and they will assist you.        Care EveryWhere ID     This is your Care EveryWhere ID. This could be used by other organizations to access your [facility]  records  [id number]         Blood Pressure from Last 3 Encounters:   09/27/18 110/60   08/15/18 98/62   08/10/18 118/67    Weight from Last 3 Encounters:   09/27/18 223 lb (101.2 kg)   08/15/18 228 lb (103.4 kg)   08/10/18 227 lb 6.4 oz (103.1 kg)              We Performed the Following     Beta HCG Qual, Urine - [facility] and [place] ([id number])        Primary Care Provider Office Phone # Fax #    Edda Pugh [phone] [address] [facility] [address]        Goals        General    Psychosocial (pt-stated)     Notes - Note edited  9/20/2016  1:05 PM by Shahrzad Hernandez, BSW    As of today's date 9/20/2016 goal is met at 26 - 50%.   Goal Status:  Discontinued   SW unable to contact pt X3  As of today's date 6/23/2016 goal is met at 26 - 50%.   Goal Status:  Active   Pt's mother is continuing to work with county financial worker to get her 2 year old son onto Medical Assistance                                                                                  John Hernandez LSW          Equal Access to Services     [facility] AH: Hadii aad ku hadasho Soomaali, waaxda luqadaha, qaybta kaalmada adeegyada, rickey salmon . So [facility] [phone].    ATENCIÓN: Si habla español, tiene a deal disposición servicios gratuitos de asistencia lingüística. Llame al [phone].    We comply with applicable federal civil rights laws and [place] laws. We do not discriminate on the basis of race, color, national origin, age, disability, sex, sexual orientation, or gender identity.            Thank you!     Thank you for choosing [facility]  for your care. Our goal is always to provide you with excellent care. Hearing back from our patients is one way we can continue to improve our services. Please take a few minutes to complete the written survey that you may receive in the mail after your visit with us. Thank you!             Your Updated Medication List - Protect  others around you: Learn how to safely use, store and throw away your medicines at www.disposemymeds.org.          This list is accurate as of 11/16/18  1:53 PM.  Always use your most recent med list.                   Brand Name Dispense Instructions for use Diagnosis    * albuterol (2.5 MG/3ML) 0.083% neb solution     25 vial    Take 1 vial (2.5 mg) by nebulization every 6 hours as needed for shortness of breath / dyspnea or wheezing    Intermittent asthma, uncomplicated       * albuterol 108 (90 Base) MCG/ACT inhaler    VENTOLIN HFA    3 Inhaler    INHALE 2 PUFFS INTO LUNG EVERY 4 HOURS AS NEEDED FOR SHORTNESS OF BREATH/DYSPNEA/WHEEZING    Intermittent asthma, uncomplicated       benzonatate 200 MG capsule    TESSALON    21 capsule    Take 1 capsule (200 mg) by mouth 3 times daily as needed for cough    Upper respiratory tract infection, unspecified type       citalopram 40 MG tablet    celeXA    90 tablet    TAKE 1 TABLET (40 MG) BY MOUTH DAILY    Anxiety       medroxyPROGESTERone 150 MG/ML injection    DEPO-PROVERA    1 mL    Inject 1 mL (150 mg) into the muscle    Encounter for other contraceptive management       omeprazole 40 MG capsule    priLOSEC    30 capsule    Take 1 capsule (40 mg) by mouth daily    Gastroesophageal reflux disease, esophagitis presence not specified       phentermine 15 MG capsule     30 capsule    Take 1 capsule (15 mg) by mouth every morning    Morbid obesity (H)       topiramate 25 MG tablet    TOPAMAX    120 tablet    Take 2 tablets (50 mg) by mouth 2 times daily    Morbid obesity (H)       traZODone 50 MG tablet    DESYREL    60 tablet    TAKE 1-2 TABLETS BY MOUTH EVERY NIGHT AT BEDTIME AS NEEDED FOR SLEEP    Moderate recurrent major depression (H)       TYLENOL PO      Take by mouth as needed for mild pain or fever        * Notice:  This list has 2 medication(s) that are the same as other medications prescribed for you. Read the directions carefully, and ask your doctor or other  care provider to review them with you.

## 2019-01-09 DIAGNOSIS — E66.01 MORBID OBESITY (H): ICD-10-CM

## 2019-01-09 DIAGNOSIS — K21.9 GASTROESOPHAGEAL REFLUX DISEASE, ESOPHAGITIS PRESENCE NOT SPECIFIED: ICD-10-CM

## 2019-01-10 RX ORDER — TOPIRAMATE 25 MG/1
50 TABLET, FILM COATED ORAL 2 TIMES DAILY
Qty: 120 TABLET | Refills: 3 | OUTPATIENT
Start: 2019-01-10

## 2019-01-10 RX ORDER — OMEPRAZOLE 40 MG/1
40 CAPSULE, DELAYED RELEASE ORAL DAILY
Qty: 30 CAPSULE | Refills: 5 | OUTPATIENT
Start: 2019-01-10

## 2019-01-10 NOTE — TELEPHONE ENCOUNTER
Recieved refill request for omeprazole (PRILOSEC) 40 MG & topiramate (TOPAMAX) 25 MG.  Patient needs appointment scheduled prior to any refills. Clinic Coordinator notified and will follow up with the patient as appropriate. The pharmacy has been notified that the medication will not be refilled prior to an appointment being scheduled.

## 2019-03-18 DIAGNOSIS — F33.1 MODERATE RECURRENT MAJOR DEPRESSION (H): ICD-10-CM

## 2019-03-18 RX ORDER — TRAZODONE HYDROCHLORIDE 50 MG/1
TABLET, FILM COATED ORAL
Qty: 60 TABLET | Refills: 5 | Status: SHIPPED | OUTPATIENT
Start: 2019-03-18 | End: 2019-10-01

## 2019-03-28 NOTE — PROGRESS NOTES
"  SUBJECTIVE:   Gloria Sun is a 28 year old female who presents to clinic today for the following health issues:    Chief Complaint   Patient presents with     Contraception     Restart depo     Patient presents for depo injection.  She gets depo for heavy periods.  No side effects noted in the past, pregnancy test was negative, last was about 5months previous.    Gloria presents with URI symptoms.  Patient has had a productive cough for 10 days that's worse at night, along with sore throat, nasal congestion, and fatigue.  Patient denies shortness of breath, wheezing, ear pain/fullness, sinus pressure, muscle aches, headache, fever.  Patient has been taking OTC cold medications and is trying to stay hydrated.  Requests tylenol codeine tabs.    Problem list and histories reviewed & adjusted, as indicated.  Additional history: as documented    BP Readings from Last 3 Encounters:   04/01/19 122/74   09/27/18 110/60   08/15/18 98/62    Wt Readings from Last 3 Encounters:   04/01/19 108.4 kg (239 lb)   09/27/18 101.2 kg (223 lb)   08/15/18 103.4 kg (228 lb)                    Reviewed and updated as needed this visit by clinical staff  Tobacco  Allergies  Meds  Problems  Med Hx  Surg Hx  Fam Hx  Soc Hx        Reviewed and updated as needed this visit by Provider  Tobacco  Allergies  Meds  Problems  Med Hx  Surg Hx  Fam Hx         ROS:  Constitutional, HEENT, cardiovascular, pulmonary, gi and gu systems are negative, except as otherwise noted.    OBJECTIVE:     /74   Pulse 80   Temp 98.1  F (36.7  C) (Oral)   Resp 12   Ht 1.588 m (5' 2.5\")   Wt 108.4 kg (239 lb)   SpO2 98%   BMI 43.02 kg/m    Body mass index is 43.02 kg/m .  GENERAL: healthy, alert and no distress  EYES: Eyes grossly normal to inspection, PERRL and conjunctivae and sclerae normal  HENT: ear canals and TM's normal, nose and mouth without ulcers or lesions  NECK: no adenopathy, no asymmetry, masses, or scars and thyroid " normal to palpation  RESP: lungs clear to auscultation - no rales, rhonchi or wheezes  CV: regular rate and rhythm, normal S1 S2, no S3 or S4, no murmur, click or rub, no peripheral edema and peripheral pulses strong  ABDOMEN: soft, nontender, no hepatosplenomegaly, no masses and bowel sounds normal  SKIN: no suspicious lesions or rashes  PSYCH: mentation appears normal, affect normal/bright    ASSESSMENT/PLAN:       ICD-10-CM    1. Encounter for surveillance of injectable contraceptive Z30.42 HCG Qual, Urine ([id number])     medroxyPROGESTERone (DEPO-PROVERA) syringe 150 mg   2. Acute bronchitis, unspecified organism J20.9 acetaminophen-codeine (TYLENOL #2) 300-15 MG per tablet     fluticasone (FLONASE) 50 MCG/ACT nasal spray     sodium chloride (OCEAN) 0.65 % nasal spray       Follow-up in 3 months    Mark Aguila MD  [facility]

## 2019-04-01 ENCOUNTER — OFFICE VISIT (OUTPATIENT)
Dept: FAMILY MEDICINE | Facility: CLINIC | Age: 29
End: 2019-04-01
Payer: COMMERCIAL

## 2019-04-01 VITALS
HEIGHT: 63 IN | SYSTOLIC BLOOD PRESSURE: 122 MMHG | RESPIRATION RATE: 12 BRPM | WEIGHT: 239 LBS | TEMPERATURE: 98.1 F | DIASTOLIC BLOOD PRESSURE: 74 MMHG | BODY MASS INDEX: 42.35 KG/M2 | OXYGEN SATURATION: 98 % | HEART RATE: 80 BPM

## 2019-04-01 DIAGNOSIS — Z30.42 ENCOUNTER FOR SURVEILLANCE OF INJECTABLE CONTRACEPTIVE: Primary | ICD-10-CM

## 2019-04-01 DIAGNOSIS — J20.9 ACUTE BRONCHITIS, UNSPECIFIED ORGANISM: ICD-10-CM

## 2019-04-01 LAB — HCG UR QL: NEGATIVE

## 2019-04-01 PROCEDURE — 96372 THER/PROPH/DIAG INJ SC/IM: CPT | Performed by: FAMILY MEDICINE

## 2019-04-01 PROCEDURE — 81025 URINE PREGNANCY TEST: CPT | Performed by: FAMILY MEDICINE

## 2019-04-01 PROCEDURE — 99214 OFFICE O/P EST MOD 30 MIN: CPT | Mod: 25 | Performed by: FAMILY MEDICINE

## 2019-04-01 RX ORDER — ECHINACEA PURPUREA EXTRACT 125 MG
2 TABLET ORAL DAILY PRN
Qty: 88 ML | Refills: 1 | Status: SHIPPED | OUTPATIENT
Start: 2019-04-01 | End: 2019-11-09

## 2019-04-01 RX ORDER — FLUTICASONE PROPIONATE 50 MCG
1 SPRAY, SUSPENSION (ML) NASAL DAILY
Qty: 16 G | Refills: 0 | Status: SHIPPED | OUTPATIENT
Start: 2019-04-01 | End: 2020-12-01

## 2019-04-01 RX ORDER — MEDROXYPROGESTERONE ACETATE 150 MG/ML
150 INJECTION, SUSPENSION INTRAMUSCULAR ONCE
Status: COMPLETED | OUTPATIENT
Start: 2019-04-01 | End: 2019-04-01

## 2019-04-01 RX ADMIN — MEDROXYPROGESTERONE ACETATE 150 MG: 150 INJECTION, SUSPENSION INTRAMUSCULAR at 11:00

## 2019-04-01 ASSESSMENT — ANXIETY QUESTIONNAIRES
2. NOT BEING ABLE TO STOP OR CONTROL WORRYING: NOT AT ALL
3. WORRYING TOO MUCH ABOUT DIFFERENT THINGS: NOT AT ALL
7. FEELING AFRAID AS IF SOMETHING AWFUL MIGHT HAPPEN: NOT AT ALL
6. BECOMING EASILY ANNOYED OR IRRITABLE: NOT AT ALL
1. FEELING NERVOUS, ANXIOUS, OR ON EDGE: NOT AT ALL
5. BEING SO RESTLESS THAT IT IS HARD TO SIT STILL: NOT AT ALL
IF YOU CHECKED OFF ANY PROBLEMS ON THIS QUESTIONNAIRE, HOW DIFFICULT HAVE THESE PROBLEMS MADE IT FOR YOU TO DO YOUR WORK, TAKE CARE OF THINGS AT HOME, OR GET ALONG WITH OTHER PEOPLE: NOT DIFFICULT AT ALL
GAD7 TOTAL SCORE: 0

## 2019-04-01 ASSESSMENT — PATIENT HEALTH QUESTIONNAIRE - PHQ9
5. POOR APPETITE OR OVEREATING: NOT AT ALL
SUM OF ALL RESPONSES TO PHQ QUESTIONS 1-9: 1

## 2019-04-01 ASSESSMENT — PAIN SCALES - GENERAL: PAINLEVEL: NO PAIN (0)

## 2019-04-01 ASSESSMENT — MIFFLIN-ST. JEOR: SCORE: 1780.29

## 2019-04-02 ASSESSMENT — ANXIETY QUESTIONNAIRES: GAD7 TOTAL SCORE: 0

## 2019-04-02 ASSESSMENT — ASTHMA QUESTIONNAIRES: ACT_TOTALSCORE: 21

## 2019-06-18 ENCOUNTER — OFFICE VISIT (OUTPATIENT)
Dept: FAMILY MEDICINE | Facility: CLINIC | Age: 29
End: 2019-06-18
Payer: COMMERCIAL

## 2019-06-18 VITALS
HEART RATE: 60 BPM | DIASTOLIC BLOOD PRESSURE: 66 MMHG | SYSTOLIC BLOOD PRESSURE: 100 MMHG | BODY MASS INDEX: 38.88 KG/M2 | WEIGHT: 216 LBS | OXYGEN SATURATION: 100 % | TEMPERATURE: 98.6 F

## 2019-06-18 DIAGNOSIS — K64.8 INTERNAL HEMORRHOID, BLEEDING: Primary | ICD-10-CM

## 2019-06-18 PROCEDURE — 99213 OFFICE O/P EST LOW 20 MIN: CPT | Performed by: PHYSICIAN ASSISTANT

## 2019-06-18 RX ORDER — HYDROCORTISONE ACETATE 25 MG/1
25 SUPPOSITORY RECTAL 2 TIMES DAILY PRN
Qty: 24 SUPPOSITORY | Refills: 0 | Status: SHIPPED | OUTPATIENT
Start: 2019-06-18 | End: 2019-11-09

## 2019-06-18 ASSESSMENT — PAIN SCALES - GENERAL: PAINLEVEL: MODERATE PAIN (5)

## 2019-06-18 NOTE — PATIENT INSTRUCTIONS
Clotrimazole cream twice per day for 2-3 weeks. Change socks when wet    If not improving return to clinic.

## 2019-06-18 NOTE — PROGRESS NOTES
Subjective     Gloria Sun is a 28 year old female who presents to clinic today for the following health issues:    HPI   Concern - Rectal bleeding  Onset: 11 days ago    Description:   Runny with clots, even with out a BM    Intensity: moderate    Progression of Symptoms:  worsening    Accompanying Signs & Symptoms:  Mild right rectal pain    Previous history of similar problem:   no    Precipitating factors:   Worsened by: BM    Alleviating factors:  Improved by:     Therapies Tried and outcome:     On the right side she could feel something on the right side of the rectum. Sometimes it was bleeding without pooping.   Stools are soft. Has been going more often recently.   Blood in the underwear between bowel movement. bright red blood with clots.   No stool incontinence.   No family history of colon cancer.   No family history of ulcerative colitis or crohn's disease.   Lower left abdominal pain and lower back pain periodically.   No treatments.   Hurts a little bit, mostly on the right side.     No vaginal bleeding.     Reviewed and updated as needed this visit by Provider  Tobacco  Allergies  Meds  Problems  Med Hx  Surg Hx  Fam Hx         Review of Systems   ROS COMP: Constitutional, HEENT, cardiovascular, pulmonary, gi and gu systems are negative, except as otherwise noted.      Objective    /66 (BP Location: Right arm, Patient Position: Chair, Cuff Size: Adult Large)   Pulse 60   Temp 98.6  F (37  C) (Oral)   Wt 216 lb (98 kg)   SpO2 100%   BMI 38.88 kg/m    Body mass index is 38.88 kg/m .  Physical Exam   GENERAL: healthy, alert and no distress  RECTAL (female): normal sphincter tone, no rectal masses- pain with palpation along the 11 o clock position, no vivek blood, small hemorrhoid appreciated.     Diagnostic Test Results:  none         Assessment & Plan       ICD-10-CM    1. Internal hemorrhoid, bleeding K64.8 hydrocortisone (ANUSOL-HC) 25 MG suppository     COLORECTAL SURGERY  REFERRAL   Use anusol suppository for 1 week. Advised daily soft stools. Avoid straining.   Colorectal referral if needed and symptoms not improving.         No follow-ups on file.    Isadora Cifuentes PA-C  [facility]

## 2019-07-03 DIAGNOSIS — F41.9 ANXIETY: ICD-10-CM

## 2019-07-04 RX ORDER — CITALOPRAM HYDROBROMIDE 40 MG/1
TABLET ORAL
Qty: 30 TABLET | Refills: 2 | Status: SHIPPED | OUTPATIENT
Start: 2019-07-04 | End: 2019-10-01

## 2019-07-10 NOTE — PROGRESS NOTES
"Subjective     Gloria Sun is a 28 year old female who presents to clinic today for the following health issues:    HPI   Patient presents with:  Contraception: would like to restart Depo, last depo: 04/01/2019  Health Maintenance: DUE: PREVENTIVE CARE VISIT, AAP,PAP W/HPV, ACT        Review of Systems   ROS COMP: Constitutional, HEENT, cardiovascular, pulmonary, gi and gu systems are negative, except as otherwise noted.      Objective    /60   Pulse 94   Temp 97.6  F (36.4  C) (Oral)   Resp 18   Ht 1.588 m (5' 2.5\")   Wt 96.4 kg (212 lb 9.6 oz)   SpO2 96%   BMI 38.27 kg/m    Body mass index is 38.27 kg/m .  Physical Exam     Total visit time is 15 Minutes with > 10 Minutes spent in care and consultation regarding Depo renewal with labs, orders and follow up plan.      Diagnostic Test Results:  Labs reviewed in Epic  No results found for this or any previous visit (from the past 24 hour(s)).        Assessment & Plan     1. Encounter for management and injection of injectable progestin contraceptive  - medroxyPROGESTERone (DEPO-PROVERA) 150 MG/ML IM injection; Inject 1 mL (150 mg) into the muscle every 3 months  Dispense: 1 mL; Refill: 3    2. Unprotected sex  - HCG Qual, Urine ([id number])     BMI:   Estimated body mass index is 38.27 kg/m  as calculated from the following:    Height as of this encounter: 1.588 m (5' 2.5\").    Weight as of this encounter: 96.4 kg (212 lb 9.6 oz).               No follow-ups on file.    Dima Muñiz PA-C  [facility]      "

## 2019-07-11 ENCOUNTER — OFFICE VISIT (OUTPATIENT)
Dept: FAMILY MEDICINE | Facility: CLINIC | Age: 29
End: 2019-07-11
Payer: COMMERCIAL

## 2019-07-11 VITALS
RESPIRATION RATE: 18 BRPM | DIASTOLIC BLOOD PRESSURE: 60 MMHG | SYSTOLIC BLOOD PRESSURE: 114 MMHG | HEIGHT: 63 IN | OXYGEN SATURATION: 96 % | BODY MASS INDEX: 37.67 KG/M2 | HEART RATE: 94 BPM | TEMPERATURE: 97.6 F | WEIGHT: 212.6 LBS

## 2019-07-11 DIAGNOSIS — Z30.42 ENCOUNTER FOR MANAGEMENT AND INJECTION OF INJECTABLE PROGESTIN CONTRACEPTIVE: Primary | ICD-10-CM

## 2019-07-11 DIAGNOSIS — Z72.51 UNPROTECTED SEX: ICD-10-CM

## 2019-07-11 LAB — HCG UR QL: NEGATIVE

## 2019-07-11 PROCEDURE — 81025 URINE PREGNANCY TEST: CPT | Performed by: PHYSICIAN ASSISTANT

## 2019-07-11 PROCEDURE — 96372 THER/PROPH/DIAG INJ SC/IM: CPT | Performed by: PHYSICIAN ASSISTANT

## 2019-07-11 PROCEDURE — 99213 OFFICE O/P EST LOW 20 MIN: CPT | Mod: 25 | Performed by: PHYSICIAN ASSISTANT

## 2019-07-11 RX ORDER — MEDROXYPROGESTERONE ACETATE 150 MG/ML
150 INJECTION, SUSPENSION INTRAMUSCULAR
Qty: 1 ML | Refills: 3 | OUTPATIENT
Start: 2019-07-11 | End: 2019-10-17

## 2019-07-11 RX ORDER — MEDROXYPROGESTERONE ACETATE 150 MG/ML
150 INJECTION, SUSPENSION INTRAMUSCULAR ONCE
Status: COMPLETED | OUTPATIENT
Start: 2019-07-11 | End: 2019-07-11

## 2019-07-11 RX ADMIN — MEDROXYPROGESTERONE ACETATE 150 MG: 150 INJECTION, SUSPENSION INTRAMUSCULAR at 09:10

## 2019-07-11 ASSESSMENT — MIFFLIN-ST. JEOR: SCORE: 1660.54

## 2019-07-11 ASSESSMENT — PATIENT HEALTH QUESTIONNAIRE - PHQ9: SUM OF ALL RESPONSES TO PHQ QUESTIONS 1-9: 0

## 2019-07-12 ASSESSMENT — ASTHMA QUESTIONNAIRES: ACT_TOTALSCORE: 21

## 2019-07-28 DIAGNOSIS — J45.20 INTERMITTENT ASTHMA, UNCOMPLICATED: ICD-10-CM

## 2019-07-29 RX ORDER — ALBUTEROL SULFATE 0.83 MG/ML
SOLUTION RESPIRATORY (INHALATION)
Qty: 75 ML | Refills: 0 | Status: SHIPPED | OUTPATIENT
Start: 2019-07-29 | End: 2021-04-21

## 2019-07-30 NOTE — TELEPHONE ENCOUNTER
Prescription approved per [facility] Refill Protocol.  Emilie Fischer RN

## 2019-08-30 ENCOUNTER — TELEPHONE (OUTPATIENT)
Dept: FAMILY MEDICINE | Facility: CLINIC | Age: 29
End: 2019-08-30

## 2019-08-30 NOTE — TELEPHONE ENCOUNTER
Pt is past due for Pap follow up  Reminder letter has been sent  LMTC her clinic with any questions or to schedule    Madeline Veliz,   Pap Tracking

## 2019-10-01 DIAGNOSIS — F33.1 MODERATE RECURRENT MAJOR DEPRESSION (H): ICD-10-CM

## 2019-10-01 DIAGNOSIS — F41.9 ANXIETY: ICD-10-CM

## 2019-10-03 RX ORDER — TRAZODONE HYDROCHLORIDE 50 MG/1
TABLET, FILM COATED ORAL
Qty: 60 TABLET | Refills: 0 | Status: SHIPPED | OUTPATIENT
Start: 2019-10-03 | End: 2019-10-17

## 2019-10-03 RX ORDER — CITALOPRAM HYDROBROMIDE 40 MG/1
TABLET ORAL
Qty: 30 TABLET | Refills: 2 | Status: SHIPPED | OUTPATIENT
Start: 2019-10-03 | End: 2019-10-17

## 2019-10-04 NOTE — TELEPHONE ENCOUNTER
Prescription approved per [facility] Refill Protocol.  Emilie Fischer RN

## 2019-10-17 ENCOUNTER — OFFICE VISIT (OUTPATIENT)
Dept: FAMILY MEDICINE | Facility: CLINIC | Age: 29
End: 2019-10-17
Payer: COMMERCIAL

## 2019-10-17 VITALS
HEART RATE: 70 BPM | HEIGHT: 63 IN | WEIGHT: 223.2 LBS | RESPIRATION RATE: 18 BRPM | DIASTOLIC BLOOD PRESSURE: 60 MMHG | OXYGEN SATURATION: 98 % | BODY MASS INDEX: 39.55 KG/M2 | SYSTOLIC BLOOD PRESSURE: 110 MMHG | TEMPERATURE: 98.7 F

## 2019-10-17 DIAGNOSIS — Z00.00 ROUTINE GENERAL MEDICAL EXAMINATION AT A HEALTH CARE FACILITY: Primary | ICD-10-CM

## 2019-10-17 DIAGNOSIS — Z12.4 SCREENING FOR MALIGNANT NEOPLASM OF CERVIX: ICD-10-CM

## 2019-10-17 DIAGNOSIS — Z30.42 ENCOUNTER FOR MANAGEMENT AND INJECTION OF INJECTABLE PROGESTIN CONTRACEPTIVE: ICD-10-CM

## 2019-10-17 DIAGNOSIS — F41.9 ANXIETY: ICD-10-CM

## 2019-10-17 DIAGNOSIS — F32.5 DEPRESSION, MAJOR, IN REMISSION (H): ICD-10-CM

## 2019-10-17 DIAGNOSIS — K21.00 GASTROESOPHAGEAL REFLUX DISEASE WITH ESOPHAGITIS: ICD-10-CM

## 2019-10-17 DIAGNOSIS — R87.810 CERVICAL HIGH RISK HPV (HUMAN PAPILLOMAVIRUS) TEST POSITIVE: ICD-10-CM

## 2019-10-17 PROCEDURE — 96372 THER/PROPH/DIAG INJ SC/IM: CPT | Performed by: PHYSICIAN ASSISTANT

## 2019-10-17 PROCEDURE — G0476 HPV COMBO ASSAY CA SCREEN: HCPCS | Performed by: PHYSICIAN ASSISTANT

## 2019-10-17 PROCEDURE — G0145 SCR C/V CYTO,THINLAYER,RESCR: HCPCS | Performed by: PHYSICIAN ASSISTANT

## 2019-10-17 PROCEDURE — 99395 PREV VISIT EST AGE 18-39: CPT | Performed by: PHYSICIAN ASSISTANT

## 2019-10-17 RX ORDER — TRAZODONE HYDROCHLORIDE 50 MG/1
TABLET, FILM COATED ORAL
Qty: 60 TABLET | Refills: 0 | Status: SHIPPED | OUTPATIENT
Start: 2019-10-17 | End: 2019-12-19

## 2019-10-17 RX ORDER — MEDROXYPROGESTERONE ACETATE 150 MG/ML
150 INJECTION, SUSPENSION INTRAMUSCULAR
Qty: 1 ML | Refills: 3 | OUTPATIENT
Start: 2019-10-17 | End: 2019-10-17

## 2019-10-17 RX ORDER — MEDROXYPROGESTERONE ACETATE 150 MG/ML
150 INJECTION, SUSPENSION INTRAMUSCULAR
Status: DISCONTINUED | OUTPATIENT
Start: 2019-10-17 | End: 2020-12-01

## 2019-10-17 RX ORDER — CITALOPRAM HYDROBROMIDE 40 MG/1
40 TABLET ORAL DAILY
Qty: 90 TABLET | Refills: 1 | Status: SHIPPED | OUTPATIENT
Start: 2019-10-17 | End: 2022-09-30

## 2019-10-17 RX ADMIN — MEDROXYPROGESTERONE ACETATE 150 MG: 150 INJECTION, SUSPENSION INTRAMUSCULAR at 08:50

## 2019-10-17 ASSESSMENT — ENCOUNTER SYMPTOMS
SORE THROAT: 0
MYALGIAS: 0
DIZZINESS: 0
CHILLS: 0
PALPITATIONS: 0
BREAST MASS: 0
HEARTBURN: 1
NAUSEA: 0
HEMATOCHEZIA: 0
EYE PAIN: 0
SHORTNESS OF BREATH: 0
ABDOMINAL PAIN: 0
FEVER: 0
JOINT SWELLING: 0
DYSURIA: 0
CONSTIPATION: 0
COUGH: 0
HEMATURIA: 0
NERVOUS/ANXIOUS: 0
DIARRHEA: 0
HEADACHES: 0
PARESTHESIAS: 0
ARTHRALGIAS: 0
FREQUENCY: 0
WEAKNESS: 0

## 2019-10-17 ASSESSMENT — ANXIETY QUESTIONNAIRES
3. WORRYING TOO MUCH ABOUT DIFFERENT THINGS: NOT AT ALL
5. BEING SO RESTLESS THAT IT IS HARD TO SIT STILL: NOT AT ALL
7. FEELING AFRAID AS IF SOMETHING AWFUL MIGHT HAPPEN: NOT AT ALL
GAD7 TOTAL SCORE: 3
IF YOU CHECKED OFF ANY PROBLEMS ON THIS QUESTIONNAIRE, HOW DIFFICULT HAVE THESE PROBLEMS MADE IT FOR YOU TO DO YOUR WORK, TAKE CARE OF THINGS AT HOME, OR GET ALONG WITH OTHER PEOPLE: NOT DIFFICULT AT ALL
6. BECOMING EASILY ANNOYED OR IRRITABLE: SEVERAL DAYS
2. NOT BEING ABLE TO STOP OR CONTROL WORRYING: SEVERAL DAYS
1. FEELING NERVOUS, ANXIOUS, OR ON EDGE: SEVERAL DAYS

## 2019-10-17 ASSESSMENT — MIFFLIN-ST. JEOR: SCORE: 1703.94

## 2019-10-17 ASSESSMENT — PATIENT HEALTH QUESTIONNAIRE - PHQ9
SUM OF ALL RESPONSES TO PHQ QUESTIONS 1-9: 1
5. POOR APPETITE OR OVEREATING: NOT AT ALL

## 2019-10-17 NOTE — NURSING NOTE
Clinic Administered Medication Documentation    MEDICATION LIST:   Depo Provera Documentation    Prior to injection, verified patient identity using patient's name and date of birth. Medication was administered. Please see MAR and medication order for additional information. Patient instructed to remain in clinic for 15 minutes.    BP: 110/60    LAST PAP/EXAM:   Lab Results   Component Value Date    PAP NIL 08/15/2018     URINE HCG:not indicated    NEXT INJECTION DUE: 1/2/20 - 1/16/20    Was entire vial of medication used? Yes  Vial/Syringe: Single dose vial  Expiration Date:  01/2021    The following medication was given:     MEDICATION: Depo Provera 150mg  ROUTE: IM  SITE: Deltoid - Right  DOSE: 1 ML  LOT #: [id number]  :  [place]   EXPIRATION DATE:  01/2021  NDC: 84355-060-46  NEXT INJECTION DUE: 01/02/2020 TO 01/16/2020  Grace Arzola CMA

## 2019-10-17 NOTE — PROGRESS NOTES
SUBJECTIVE:   CC: Gloria Sun is an 29 year old woman who presents for preventive health visit.     Healthy Habits:     Getting at least 3 servings of Calcium per day:  Yes    Bi-annual eye exam:  NO    Dental care twice a year:  NO    Sleep apnea or symptoms of sleep apnea:  None    Diet:  Regular (no restrictions)    Frequency of exercise:  1 day/week    Duration of exercise:  Less than 15 minutes    Taking medications regularly:  Yes    Barriers to taking medications:  None    Medication side effects:  None    PHQ-2 Total Score: 0    Additional concerns today:  No  Contraception   Pertinent negatives include no abdominal pain, arthralgias, chest pain, chills, congestion, coughing, fever, headaches, joint swelling, myalgias, nausea, rash, sore throat or weakness.           -------------------------------------    Today's PHQ-2 Score:   PHQ-2 ( 1999 [employer]) 10/17/2019   Q1: Little interest or pleasure in doing things 0   Q2: Feeling down, depressed or hopeless 0   PHQ-2 Score 0   Q1: Little interest or pleasure in doing things Not at all   Q2: Feeling down, depressed or hopeless Not at all   PHQ-2 Score 0       Abuse: Current or Past(Physical, Sexual or Emotional)- No  Do you feel safe in your environment? Yes    Social History     Tobacco Use     Smoking status: Never Smoker     Smokeless tobacco: Never Used   Substance Use Topics     Alcohol use: No     Alcohol/week: 0.0 standard drinks         Alcohol Use 10/17/2019   Prescreen: >3 drinks/day or >7 drinks/week? Not Applicable   Prescreen: >3 drinks/day or >7 drinks/week? -       Reviewed orders with patient.  Reviewed health maintenance and updated orders accordingly - Yes      Pertinent mammograms are reviewed under the imaging tab.  History of abnormal Pap smear: NO - age 21-29 PAP every 3 years recommended  PAP / HPV Latest Ref Rng & Units 8/15/2018 8/3/2017 12/15/2016   PAP - NIL NIL -   HPV 16 DNA NEG:Negative Negative Negative Negative   HPV 18 DNA  "NEG:Negative Negative Negative Negative   OTHER HR HPV NEG:Negative Negative Positive(A) Positive(A)     Reviewed and updated as needed this visit by clinical staff  Tobacco  Allergies  Meds  Med Hx  Surg Hx  Fam Hx  Soc Hx        Reviewed and updated as needed this visit by Provider          Review of Systems   Constitutional: Negative for chills and fever.   HENT: Negative for congestion, ear pain, hearing loss and sore throat.    Eyes: Negative for pain and visual disturbance.   Respiratory: Negative for cough and shortness of breath.    Cardiovascular: Negative for chest pain, palpitations and peripheral edema.   Gastrointestinal: Positive for heartburn. Negative for abdominal pain, constipation, diarrhea, hematochezia and nausea.   Breasts:  Negative for tenderness, breast mass and discharge.   Genitourinary: Negative for dysuria, frequency, genital sores, hematuria, pelvic pain, urgency, vaginal bleeding and vaginal discharge.   Musculoskeletal: Negative for arthralgias, joint swelling and myalgias.   Skin: Negative for rash.   Neurological: Negative for dizziness, weakness, headaches and paresthesias.   Psychiatric/Behavioral: Negative for mood changes. The patient is not nervous/anxious.           OBJECTIVE:   /60   Pulse 70   Temp 98.7  F (37.1  C) (Oral)   Resp 18   Ht 1.588 m (5' 2.52\")   Wt 101.2 kg (223 lb 3.2 oz)   SpO2 98%   BMI 40.15 kg/m    Physical Exam  GENERAL: healthy, alert and no distress  EYES: Eyes grossly normal to inspection, PERRL and conjunctivae and sclerae normal  HENT: ear canals and TM's normal, nose and mouth without ulcers or lesions  NECK: no adenopathy, no asymmetry, masses, or scars and thyroid normal to palpation  RESP: lungs clear to auscultation - no rales, rhonchi or wheezes  BREAST: normal without masses, tenderness or nipple discharge and no palpable axillary masses or adenopathy  CV: regular rate and rhythm, normal S1 S2, no S3 or S4, no murmur, click " "or rub, no peripheral edema and peripheral pulses strong  ABDOMEN: soft, nontender, no hepatosplenomegaly, no masses and bowel sounds normal   (female): normal female external genitalia, normal urethral meatus , vaginal mucosa pink, moist, well rugated and normal cervix, adnexae, and uterus without masses.  MS: no gross musculoskeletal defects noted, no edema  SKIN: no suspicious lesions or rashes  NEURO: Normal strength and tone, mentation intact and speech normal  PSYCH: mentation appears normal, affect normal/bright    Diagnostic Test Results:  Labs reviewed in Epic    ASSESSMENT/PLAN:   1. Routine general medical examination at a health care facility    2. Screening for malignant neoplasm of cervix  - Pap imaged thin layer screen reflex to HPV if ASCUS - recommend age 25 - 29  - HPV High Risk Types DNA Cervical    3. Anxiety  - citalopram (CELEXA) 40 MG tablet; Take 1 tablet (40 mg) by mouth daily  Dispense: 90 tablet; Refill: 1    4. Depression, major, in remission (H)  - traZODone (DESYREL) 50 MG tablet; TAKE 1-2 TABLETS BY MOUTH EVERY NIGHT AT BEDTIME AS NEEDED FOR SLEEP  Dispense: 60 tablet; Refill: 0    5. Gastroesophageal reflux disease with esophagitis  - ranitidine (ZANTAC) 150 MG capsule; Take 1 capsule (150 mg) by mouth 2 times daily  Dispense: 30 capsule; Refill: 3    6. Encounter for management and injection of injectable progestin contraceptive  - medroxyPROGESTERone (DEPO-PROVERA) injection 150 mg    COUNSELING:  Reviewed preventive health counseling, as reflected in patient instructions    Estimated body mass index is 40.15 kg/m  as calculated from the following:    Height as of this encounter: 1.588 m (5' 2.52\").    Weight as of this encounter: 101.2 kg (223 lb 3.2 oz).    Weight management plan: Discussed healthy diet and exercise guidelines     reports that she has never smoked. She has never used smokeless tobacco.    Follow up in 1 year.  Patient amenable to this follow up plan. "     Counseling Resources:  ATP IV Guidelines  Pooled Cohorts Equation Calculator  Breast Cancer Risk Calculator  FRAX Risk Assessment  ICSI Preventive Guidelines  Dietary Guidelines for Americans, 2010  USDA's MyPlate  ASA Prophylaxis  Lung CA Screening    Dima Muñiz PA-C  [facility]

## 2019-10-18 ENCOUNTER — ALLIED HEALTH/NURSE VISIT (OUTPATIENT)
Dept: NURSING | Facility: CLINIC | Age: 29
End: 2019-10-18
Payer: COMMERCIAL

## 2019-10-18 DIAGNOSIS — Z23 NEED FOR PROPHYLACTIC VACCINATION AND INOCULATION AGAINST INFLUENZA: Primary | ICD-10-CM

## 2019-10-18 PROCEDURE — 99207 ZZC NO CHARGE NURSE ONLY: CPT

## 2019-10-18 PROCEDURE — 90471 IMMUNIZATION ADMIN: CPT

## 2019-10-18 PROCEDURE — 90686 IIV4 VACC NO PRSV 0.5 ML IM: CPT

## 2019-10-18 ASSESSMENT — ANXIETY QUESTIONNAIRES: GAD7 TOTAL SCORE: 3

## 2019-10-18 ASSESSMENT — ASTHMA QUESTIONNAIRES: ACT_TOTALSCORE: 24

## 2019-10-21 LAB
COPATH REPORT: NORMAL
PAP: NORMAL

## 2019-10-24 LAB
FINAL DIAGNOSIS: ABNORMAL
HPV HR 12 DNA CVX QL NAA+PROBE: POSITIVE
HPV16 DNA SPEC QL NAA+PROBE: NEGATIVE
HPV18 DNA SPEC QL NAA+PROBE: NEGATIVE
SPECIMEN DESCRIPTION: ABNORMAL
SPECIMEN SOURCE CVX/VAG CYTO: ABNORMAL

## 2019-11-09 ENCOUNTER — OFFICE VISIT (OUTPATIENT)
Dept: URGENT CARE | Facility: URGENT CARE | Age: 29
End: 2019-11-09
Payer: COMMERCIAL

## 2019-11-09 VITALS
TEMPERATURE: 98.3 F | DIASTOLIC BLOOD PRESSURE: 85 MMHG | WEIGHT: 223 LBS | RESPIRATION RATE: 16 BRPM | BODY MASS INDEX: 40.11 KG/M2 | HEART RATE: 56 BPM | SYSTOLIC BLOOD PRESSURE: 135 MMHG | OXYGEN SATURATION: 100 %

## 2019-11-09 DIAGNOSIS — R82.90 ABNORMAL FINDING ON URINALYSIS: ICD-10-CM

## 2019-11-09 DIAGNOSIS — H81.10 BENIGN PAROXYSMAL POSITIONAL VERTIGO, UNSPECIFIED LATERALITY: Primary | ICD-10-CM

## 2019-11-09 LAB
ALBUMIN UR-MCNC: NEGATIVE MG/DL
APPEARANCE UR: CLEAR
BACTERIA #/AREA URNS HPF: ABNORMAL /HPF
BILIRUB UR QL STRIP: NEGATIVE
COLOR UR AUTO: YELLOW
GLUCOSE UR STRIP-MCNC: NEGATIVE MG/DL
HCG UR QL: NEGATIVE
HGB UR QL STRIP: ABNORMAL
KETONES UR STRIP-MCNC: NEGATIVE MG/DL
LEUKOCYTE ESTERASE UR QL STRIP: NEGATIVE
MUCOUS THREADS #/AREA URNS LPF: PRESENT /LPF
NITRATE UR QL: NEGATIVE
NON-SQ EPI CELLS #/AREA URNS LPF: ABNORMAL /LPF
PH UR STRIP: 6.5 PH (ref 5–7)
RBC #/AREA URNS AUTO: ABNORMAL /HPF
SOURCE: ABNORMAL
SP GR UR STRIP: 1.02 (ref 1–1.03)
UROBILINOGEN UR STRIP-ACNC: 0.2 EU/DL (ref 0.2–1)
WBC #/AREA URNS AUTO: ABNORMAL /HPF

## 2019-11-09 PROCEDURE — 87086 URINE CULTURE/COLONY COUNT: CPT | Performed by: PHYSICIAN ASSISTANT

## 2019-11-09 PROCEDURE — 81001 URINALYSIS AUTO W/SCOPE: CPT | Performed by: PHYSICIAN ASSISTANT

## 2019-11-09 PROCEDURE — 99214 OFFICE O/P EST MOD 30 MIN: CPT | Performed by: PHYSICIAN ASSISTANT

## 2019-11-09 PROCEDURE — 81025 URINE PREGNANCY TEST: CPT | Performed by: PHYSICIAN ASSISTANT

## 2019-11-09 RX ORDER — NITROFURANTOIN 25; 75 MG/1; MG/1
100 CAPSULE ORAL 2 TIMES DAILY
Qty: 14 CAPSULE | Refills: 0 | Status: SHIPPED | OUTPATIENT
Start: 2019-11-09 | End: 2019-11-16

## 2019-11-09 RX ORDER — ONDANSETRON 4 MG/1
4 TABLET, FILM COATED ORAL EVERY 8 HOURS PRN
Qty: 30 TABLET | Refills: 0 | Status: SHIPPED | OUTPATIENT
Start: 2019-11-09 | End: 2020-12-01

## 2019-11-09 RX ORDER — MECLIZINE HYDROCHLORIDE 25 MG/1
25 TABLET ORAL 3 TIMES DAILY PRN
Qty: 30 TABLET | Refills: 0 | Status: SHIPPED | OUTPATIENT
Start: 2019-11-09 | End: 2020-12-01

## 2019-11-09 ASSESSMENT — ENCOUNTER SYMPTOMS
TREMORS: 0
SINUS PRESSURE: 0
RESPIRATORY NEGATIVE: 1
HEADACHES: 1
SINUS PAIN: 0
NAUSEA: 1
NUMBNESS: 0
DIZZINESS: 1
SPEECH DIFFICULTY: 0
COUGH: 0
SEIZURES: 0
WHEEZING: 0
PALPITATIONS: 0
CONSTITUTIONAL NEGATIVE: 1
PARESTHESIAS: 0
CHILLS: 0
LIGHT-HEADEDNESS: 0
WEAKNESS: 0
RHINORRHEA: 0
FATIGUE: 0
CARDIOVASCULAR NEGATIVE: 1
SORE THROAT: 0
SHORTNESS OF BREATH: 0
FACIAL ASYMMETRY: 0
FEVER: 0

## 2019-11-09 ASSESSMENT — PAIN SCALES - GENERAL: PAINLEVEL: NO PAIN (0)

## 2019-11-09 NOTE — NURSING NOTE
"Chief Complaint   Patient presents with     Dizziness     C/o dizziness and nausea for the last couple days. Denies pregnancy or any other sx.       Initial /85   Pulse (!) 48   Temp 98.3  F (36.8  C) (Oral)   Resp 16   Wt 101.2 kg (223 lb)   SpO2 100%   BMI 40.11 kg/m   Estimated body mass index is 40.11 kg/m  as calculated from the following:    Height as of 10/17/19: 1.588 m (5' 2.52\").    Weight as of this encounter: 101.2 kg (223 lb)..  BP completed using cuff size: vero Rodriguez CMA    "

## 2019-11-09 NOTE — PROGRESS NOTES
Subjective   Gloria Sun is a 29 year old female who presents to clinic today for the following health issues:  HPI   Dizziness    Duration: 3days    Description   Feeling faint:  no   Feeling like the surroundings are moving: YES  Loss of consciousness or falls: no     Intensity:  moderate    Accompanying signs and symptoms:   Nausea/vomitting: YES  Palpitations: no.  No chest pain, SOB, palpitations, orthopnea, PND or peripheral edema.  Mild HA but no one sided weakness or slurred speech.    Weakness in arms or legs: no   Vision or speech changes: no   Ringing in ears (Tinnitus): no   Hearing loss related to dizziness: no   Other (fevers/chills/sweating/dyspnea):  No URI symptoms or fevers.  No abdominal pain, vomiting, constipation, diarrhea, bloody or black tarry stools.  No fever, chills or sweats.  LMP was 1month ago.  No breast tenderness, fatigue, morning sickness.    History (similar episodes/head trauma/previous evaluation/recent bleeding): None    Precipitating or alleviating factors (new meds/chemicals): None  Worse with activity/head movement: YES    Therapies tried and outcome: motion sickness meds with minimal relief    Patient Active Problem List   Diagnosis     CARDIOVASCULAR SCREENING; LDL GOAL LESS THAN 160     Chronic jaw pain     Status post gastric banding     Intermittent asthma, uncomplicated     Health Care Home     Cervical high risk HPV (human papillomavirus) test positive     Morbid obesity (H)     Major depressive disorder, recurrent episode, mild (H)     Migraines     Past Surgical History:   Procedure Laterality Date     ESOPHAGOSCOPY, GASTROSCOPY, DUODENOSCOPY (EGD), COMBINED Left 12/31/2014    Procedure: COMBINED ESOPHAGOSCOPY, GASTROSCOPY, DUODENOSCOPY (EGD), BIOPSY SINGLE OR MULTIPLE;  Surgeon: Ilan Marrero MD;  Location:  GI     LAPAROSCOPIC CHOLECYSTECTOMY  2/21/2014    Procedure: LAPAROSCOPIC CHOLECYSTECTOMY;  Laparoscopic Cholecystectomy;  Surgeon: Ilan Marrero  MD Yaya;  Location: [facility]     LAPAROSCOPIC GASTRIC SLEEVE  1/21/2013    Procedure: LAPAROSCOPIC GASTRIC SLEEVE;  Laparoscopic Sleeve Gastrectomy;  Surgeon: Chato Mathews MD;  Location: [facility] OR     MOUTH SURGERY  2009       Social History     Tobacco Use     Smoking status: Never Smoker     Smokeless tobacco: Never Used   Substance Use Topics     Alcohol use: No     Alcohol/week: 0.0 standard drinks     Family History   Problem Relation Age of Onset     Lipids Mother      Anxiety Disorder Mother      Depression Mother      Hypertension Mother      Obesity Mother      Cancer Father         skin     Diabetes Father      Hypertension Father      Obesity Father      Hypertension Maternal Grandmother      C.A.D. Maternal Grandmother         CABG     Lipids Maternal Grandmother      Depression Maternal Grandmother      Obesity Maternal Grandmother      C.A.D. Maternal Grandfather         CABG, [place]      Asthma Maternal Grandfather      Cancer Maternal Grandfather      Respiratory Maternal Grandfather      Lipids Maternal Grandfather      Hypertension Maternal Grandfather      Alzheimer Disease Maternal Grandfather      Depression Maternal Grandfather      Obesity Maternal Grandfather      Cerebrovascular Disease Paternal Grandmother      Arthritis Paternal Grandmother         d. lupus     Obesity Paternal Grandmother      Heart Disease Paternal Grandfather      Lipids Paternal Grandfather      Thyroid Disease No family hx of      Glaucoma No family hx of      Macular Degeneration No family hx of          Current Outpatient Medications   Medication Sig Dispense Refill     Acetaminophen (TYLENOL PO) Take by mouth as needed for mild pain or fever       albuterol (PROVENTIL) (2.5 MG/3ML) 0.083% neb solution INHALE 1 VIAL VIA NEBULIZATION EVERY 6 HOURS AS NEEDED FOR SHORTNESS OF BREATH OR WHEEZING 75 mL 0     albuterol (VENTOLIN HFA) 108 (90 Base) MCG/ACT inhaler INHALE 2 PUFFS INTO LUNG EVERY 4 HOURS AS NEEDED FOR  "SHORTNESS OF BREATH/DYSPNEA/WHEEZING 3 Inhaler 3     citalopram (CELEXA) 40 MG tablet Take 1 tablet (40 mg) by mouth daily 90 tablet 1     fluticasone (FLONASE) 50 MCG/ACT nasal spray Spray 1 spray into both nostrils daily (Patient not taking: Reported on 6/18/2019) 16 g 0     medroxyPROGESTERone (DEPO-PROVERA) 150 MG/ML injection Inject 1 mL (150 mg) into the muscle 1 mL 3     omeprazole (PRILOSEC) 40 MG capsule Take 1 capsule (40 mg) by mouth daily (Patient not taking: Reported on 7/11/2019) 30 capsule 5     ranitidine (ZANTAC) 150 MG capsule Take 1 capsule (150 mg) by mouth 2 times daily 30 capsule 3     traZODone (DESYREL) 50 MG tablet TAKE 1-2 TABLETS BY MOUTH EVERY NIGHT AT BEDTIME AS NEEDED FOR SLEEP 60 tablet 0     Allergies   Allergen Reactions     Codeine Nausea and Vomiting     Desogen [Apri]      Hot flashes     Desogestrel-Ethinyl Estradiol Nausea     Dust Mites      Fluoxetine      Irritable, easy bruising     Magnesium Sulfate Injection      Burning, hotflashes     Medroxyprogesterone Unknown     Swelling at injection site     Morphine      \"Weight loss surgery so it burns the inside of my stomach\"     Venlafaxine      Agitation, anxiety      Reviewed and updated as needed this visit by Provider       Review of Systems   Constitutional: Negative.  Negative for chills, fatigue and fever.   HENT: Negative for congestion, ear discharge, ear pain, hearing loss, rhinorrhea, sinus pressure, sinus pain and sore throat.    Eyes: Negative for visual disturbance.   Respiratory: Negative.  Negative for cough, shortness of breath and wheezing.    Cardiovascular: Negative.  Negative for chest pain, palpitations and peripheral edema.   Gastrointestinal: Positive for nausea.   Allergic/Immunologic: Negative for environmental allergies.   Neurological: Positive for dizziness and headaches. Negative for tremors, seizures, syncope, facial asymmetry, speech difficulty, weakness, light-headedness, numbness and " paresthesias.   All other systems reviewed and are negative.           Objective    /85   Pulse (!) 48   Temp 98.3  F (36.8  C) (Oral)   Resp 16   Wt 101.2 kg (223 lb)   SpO2 100%   BMI 40.11 kg/m    Body mass index is 40.11 kg/m .  Physical Exam  Vitals signs and nursing note reviewed.   Constitutional:       General: She is not in acute distress.     Appearance: Normal appearance. She is well-developed and normal weight. She is not ill-appearing.   HENT:      Head: Normocephalic and atraumatic.      Ears:      Comments: TMs are intact without any erythema or bulging bilaterally.  Airway is patent.     Nose: Nose normal.      Mouth/Throat:      Lips: Pink.      Mouth: Mucous membranes are moist.      Pharynx: Oropharynx is clear. Uvula midline. No pharyngeal swelling, oropharyngeal exudate or posterior oropharyngeal erythema.      Tonsils: No tonsillar exudate.   Eyes:      General: No scleral icterus.     Extraocular Movements: Extraocular movements intact.      Conjunctiva/sclera: Conjunctivae normal.      Pupils: Pupils are equal, round, and reactive to light.   Neck:      Musculoskeletal: Normal range of motion and neck supple.      Thyroid: No thyromegaly.      Meningeal: Brudzinski's sign and Kernig's sign absent.   Cardiovascular:      Rate and Rhythm: Normal rate and regular rhythm.      Pulses: Normal pulses.      Heart sounds: Normal heart sounds, S1 normal and S2 normal. No murmur. No friction rub. No gallop.    Pulmonary:      Effort: Pulmonary effort is normal. No accessory muscle usage, respiratory distress or retractions.      Breath sounds: Normal breath sounds. No decreased breath sounds, wheezing, rhonchi or rales.   Lymphadenopathy:      Cervical: No cervical adenopathy.   Skin:     General: Skin is warm and dry.      Findings: No rash.      Comments: Distal pulses are 2+ and symmetric.  No peripheral edema.   Neurological:      General: No focal deficit present.      Mental Status:  She is alert and oriented to person, place, and time.      Cranial Nerves: Cranial nerves are intact. No cranial nerve deficit, dysarthria or facial asymmetry.      Sensory: Sensation is intact. No sensory deficit.      Motor: Motor function is intact.      Coordination: Coordination is intact. Romberg sign negative. Coordination normal. Finger-Nose-Finger Test normal.      Gait: Gait is intact. Gait normal.      Deep Tendon Reflexes: Reflexes are normal and symmetric.   Psychiatric:         Mood and Affect: Mood normal.         Behavior: Behavior normal.         Thought Content: Thought content normal.         Judgement: Judgment normal.     Diagnostic Test Results:  Labs reviewed in Epic  Results for orders placed or performed in visit on 11/09/19 (from the past 24 hour(s))   HCG Qual, Urine ([id number])   Result Value Ref Range    HCG Qual Urine Negative NEG^Negative   UA with Microscopic reflex to Culture   Result Value Ref Range    Color Urine Yellow     Appearance Urine Clear     Glucose Urine Negative NEG^Negative mg/dL    Bilirubin Urine Negative NEG^Negative    Ketones Urine Negative NEG^Negative mg/dL    Specific Gravity Urine 1.020 1.003 - 1.035    pH Urine 6.5 5.0 - 7.0 pH    Protein Albumin Urine Negative NEG^Negative mg/dL    Urobilinogen Urine 0.2 0.2 - 1.0 EU/dL    Nitrite Urine Negative NEG^Negative    Blood Urine Trace (A) NEG^Negative    Leukocyte Esterase Urine Negative NEG^Negative    Source Midstream Urine     WBC Urine 5-10 (A) OTO5^0 - 5 /HPF    RBC Urine O - 2 OTO2^O - 2 /HPF    Squamous Epithelial /LPF Urine Many (A) FEW^Few /LPF    Bacteria Urine Few (A) NEG^Negative /HPF    Mucous Urine Present (A) NEG^Negative /LPF           Assessment & Plan   Benign paroxysmal positional vertigo, unspecified laterality:  UPT was negative.  No focal neurologic deficits.  This is worse with movement and changes in positions.  Increase fluids, change positions slowly and avoid quick movements of the head.   Will also send to ENT for further evaluation and management.  Recheck in clinic if symptoms worsen or if symptoms do not improve.  To the ER if she develops severe HA, visual disturbances, one sided weakness or slurred speech or chest pain.      -     HCG Qual, Urine ([id number])  -     UA with Microscopic reflex to Culture  -     meclizine (ANTIVERT) 25 MG tablet; Take 1 tablet (25 mg) by mouth 3 times daily as needed for dizziness  -     ondansetron (ZOFRAN) 4 MG tablet; Take 1 tablet (4 mg) by mouth every 8 hours as needed for nausea  -     OTOLARYNGOLOGY REFERRAL    Abnormal finding on urinalysis:  UA/micro showed WBC present but she is asymptomatic, may be causing her dizziness as well, will send for urine culture.  Will empirically treat with [id number].  Recommend increase fluids, regular voids, proper wiping techniques and voiding after intercourse.  Educated patient on warning signs of kidney infection and to go to the ER if she develops any of these symptoms.  Recheck in clinic if symptoms worsen or if symptoms do not improve.    -     Urine Culture Aerobic Bacterial  -     nitroFURantoin macrocrystal-monohydrate (MACROBID) 100 MG capsule; Take 1 capsule (100 mg) by mouth 2 times daily for 7 days            Anu Tiwari PA-C  [facility]

## 2019-11-10 LAB
BACTERIA SPEC CULT: NORMAL
SPECIMEN SOURCE: NORMAL

## 2019-12-19 DIAGNOSIS — F32.5 DEPRESSION, MAJOR, IN REMISSION (H): ICD-10-CM

## 2019-12-19 RX ORDER — TRAZODONE HYDROCHLORIDE 50 MG/1
TABLET, FILM COATED ORAL
Qty: 60 TABLET | Refills: 3 | Status: SHIPPED | OUTPATIENT
Start: 2019-12-19 | End: 2021-04-21

## 2019-12-19 NOTE — TELEPHONE ENCOUNTER
Controlled Substance Refill Request for traZODone (DESYREL) 50 MG tablet  Problem List Complete:  No     PROVIDER TO CONSIDER COMPLETION OF PROBLEM LIST AND OVERVIEW/CONTROLLED SUBSTANCE AGREEMENT    Last Written Prescription Date:  10/17/2019  Last Fill Quantity: 60,   # refills: 0    THE MOST RECENT OFFICE VISIT MUST BE WITHIN THE PAST 3 MONTHS. AT LEAST ONE FACE TO FACE VISIT MUST OCCUR EVERY 6 MONTHS. ADDITIONAL VISITS CAN BE VIRTUAL.  (THIS STATEMENT SHOULD BE DELETED.)    Last Office Visit with [facility] primary care provider: 10/17/2019    Future Office visit:     Controlled substance agreement:   Encounter-Level CSA:    There are no encounter-level csa.     Patient-Level CSA:    There are no patient-level csa.         Last Urine Drug Screen: No results found for: CDAUT, No results found for: COMDAT, No results found for: THC13, PCP13, COC13, MAMP13, OPI13, AMP13, BZO13, TCA13, MTD13, BAR13, OXY13, PPX13, BUP13     Processing:  unknown     [email or web address]       checked in past 3 months?  No, route to RN

## 2020-02-05 ENCOUNTER — OFFICE VISIT (OUTPATIENT)
Dept: FAMILY MEDICINE | Facility: CLINIC | Age: 30
End: 2020-02-05
Payer: COMMERCIAL

## 2020-02-05 VITALS
TEMPERATURE: 98.4 F | HEART RATE: 73 BPM | BODY MASS INDEX: 41.82 KG/M2 | WEIGHT: 236 LBS | DIASTOLIC BLOOD PRESSURE: 68 MMHG | HEIGHT: 63 IN | SYSTOLIC BLOOD PRESSURE: 108 MMHG | OXYGEN SATURATION: 97 % | RESPIRATION RATE: 20 BRPM

## 2020-02-05 DIAGNOSIS — E66.01 MORBID OBESITY (H): ICD-10-CM

## 2020-02-05 DIAGNOSIS — N94.6 DYSMENORRHEA: ICD-10-CM

## 2020-02-05 DIAGNOSIS — Z30.42 ENCOUNTER FOR SURVEILLANCE OF INJECTABLE CONTRACEPTIVE: Primary | ICD-10-CM

## 2020-02-05 DIAGNOSIS — F32.5 DEPRESSION, MAJOR, IN REMISSION (H): ICD-10-CM

## 2020-02-05 LAB — HCG UR QL: NEGATIVE

## 2020-02-05 PROCEDURE — 99213 OFFICE O/P EST LOW 20 MIN: CPT | Mod: 25 | Performed by: FAMILY MEDICINE

## 2020-02-05 PROCEDURE — 81025 URINE PREGNANCY TEST: CPT | Performed by: FAMILY MEDICINE

## 2020-02-05 PROCEDURE — 96372 THER/PROPH/DIAG INJ SC/IM: CPT | Performed by: FAMILY MEDICINE

## 2020-02-05 RX ORDER — MEDROXYPROGESTERONE ACETATE 150 MG/ML
150 INJECTION, SUSPENSION INTRAMUSCULAR
Qty: 1 ML | Refills: 3 | OUTPATIENT
Start: 2020-02-05 | End: 2021-04-21

## 2020-02-05 RX ADMIN — MEDROXYPROGESTERONE ACETATE 150 MG: 150 INJECTION, SUSPENSION INTRAMUSCULAR at 11:52

## 2020-02-05 ASSESSMENT — MIFFLIN-ST. JEOR: SCORE: 1762

## 2020-02-05 NOTE — PROGRESS NOTES
"Subjective     Gloria Sun is a 29 year old female who presents to clinic today for the following health issues:    HPI   Chief Complaint   Patient presents with     Contraception     Depo      Had bad dysmenorrhea and Depo help so that does not get a period.  He to continue depo shots; she usually forgets return dates.  Because is late will check Pregnancy test    Review of Systems         Objective    /68   Pulse 73   Temp 98.4  F (36.9  C) (Oral)   Resp 20   Ht 1.588 m (5' 2.52\")   Wt 107 kg (236 lb)   LMP  (LMP Unknown)   SpO2 97%   BMI 42.45 kg/m    Body mass index is 42.45 kg/m .  Physical Exam   GENERAL: healthy, alert and no distress  RESP: lungs clear to auscultation - no rales, rhonchi or wheezes  CV: regular rate and rhythm, normal S1 S2, no S3 or S4, no murmur, click or rub, no peripheral edema     Results for orders placed or performed in visit on 02/05/20   HCG Qual, Urine ([id number])     Status: None   Result Value Ref Range    HCG Qual Urine Negative NEG^Negative     Assessment & Plan     Gloria was seen today for contraception.    Diagnoses and all orders for this visit:    Encounter for surveillance of injectable contraceptive  -     HCG Qual, Urine ([id number])  -     medroxyPROGESTERone (DEPO-PROVERA) 150 MG/ML IM injection; Inject 1 mL (150 mg) into the muscle every 3 months    Depression, major, in remission (H)    Morbid obesity (H)    Dysmenorrhea  -     medroxyPROGESTERone (DEPO-PROVERA) 150 MG/ML IM injection; Inject 1 mL (150 mg) into the muscle every 3 months      Return in about 3 months (around 5/5/2020) for Routine Visit.    Henry Paredes MD  [facility]    "

## 2020-02-05 NOTE — NURSING NOTE
Clinic Administered Medication Documentation    MEDICATION LIST:   Depo Provera Documentation    Prior to injection, verified patient identity using patient's name and date of birth. Medication was administered. Please see MAR and medication order for additional information. Patient instructed to remain in clinic for 15 minutes.    BP: 108/68    LAST PAP/EXAM:   Lab Results   Component Value Date    PAP NIL 10/17/2019     URINE HCG:negative    NEXT INJECTION DUE: 4/22/20 - 5/6/20    Was entire vial of medication used? Yes  Vial/Syringe: Single dose vial  Expiration Date:  10/2020

## 2020-05-08 ENCOUNTER — ALLIED HEALTH/NURSE VISIT (OUTPATIENT)
Dept: NURSING | Facility: CLINIC | Age: 30
End: 2020-05-08
Payer: COMMERCIAL

## 2020-05-08 ENCOUNTER — TELEPHONE (OUTPATIENT)
Dept: FAMILY MEDICINE | Facility: CLINIC | Age: 30
End: 2020-05-08

## 2020-05-08 DIAGNOSIS — Z30.9 CONTRACEPTIVE MANAGEMENT: Primary | ICD-10-CM

## 2020-05-08 DIAGNOSIS — K21.00 GASTROESOPHAGEAL REFLUX DISEASE WITH ESOPHAGITIS: Primary | ICD-10-CM

## 2020-05-08 LAB — HCG UR QL: NEGATIVE

## 2020-05-08 PROCEDURE — 99207 ZZC NO CHARGE NURSE ONLY: CPT

## 2020-05-08 PROCEDURE — 81025 URINE PREGNANCY TEST: CPT | Performed by: NURSE PRACTITIONER

## 2020-05-08 PROCEDURE — 96372 THER/PROPH/DIAG INJ SC/IM: CPT

## 2020-05-08 RX ADMIN — MEDROXYPROGESTERONE ACETATE 150 MG: 150 INJECTION, SUSPENSION INTRAMUSCULAR at 15:20

## 2020-05-08 NOTE — PROGRESS NOTES
Clinic Administered Medication Documentation      Depo Provera Documentation    URINE HCG: negative    Depo-Provera Standing Order inclusion/exclusion criteria reviewed.   Patient meets: inclusion criteria     BP: Data Unavailable  LAST PAP/EXAM:   Lab Results   Component Value Date    PAP NIL 10/17/2019       Prior to injection, verified patient identity using patient's name and date of birth. Medication was administered. Please see MAR and medication order for additional information.     Was entire vial of medication used? Yes  Vial/Syringe: Single dose vial  Expiration Date:  03/2021    Patient instructed to remain in clinic for 15 minutes.  NEXT INJECTION DUE: 7/24/20 - 8/7/20    Left arm    Ryan Webber. MA

## 2020-05-08 NOTE — TELEPHONE ENCOUNTER
Letter received from [facility] that there has been an FDA recall of all raniditine products (zantac).    Please review and make treatment changes as necessary. Indiana Beasley,

## 2020-05-08 NOTE — TELEPHONE ENCOUNTER
Called and pt is currently at an appt. Will try again later. Person who answered recommended we call pt on her mobile number.    Tere Diamond RN  [facility]

## 2020-05-12 RX ORDER — FAMOTIDINE 20 MG/1
20 TABLET, FILM COATED ORAL 2 TIMES DAILY
Qty: 180 TABLET | Status: CANCELLED | OUTPATIENT
Start: 2020-05-12

## 2020-05-12 NOTE — TELEPHONE ENCOUNTER
Called patient. Her Rx for ranitidine was last prescribed 10/2019 for 4 month supply. Pt not sure if she is taking ranitidine anymore. Thinks that she is now taking omeprazole for GERD, but she is not at home to check. She will check her prescriptions and then send us a [employer]¢â[employer] Interactive message when she gets home.    Alternative for Ranitidine (Zantac) 150 mg BID would be famotidine 20 mg BID. Rx cued.

## 2020-05-18 ENCOUNTER — TELEPHONE (OUTPATIENT)
Dept: FAMILY MEDICINE | Facility: CLINIC | Age: 30
End: 2020-05-18

## 2020-05-18 NOTE — TELEPHONE ENCOUNTER
[facility] has mailed a letter     ranitidine (ZANTAC) FDA Drug Recall Information    Formulary Alternatives: Famotidine*, Cimetidine, Nizatidine    *Pharmacies may be experiencing supply shortages    Please review and make treatment changes as necessary. This recommendation dose not replace your clinical judgement or address the specific needs of your patient.     Thank you, Madiha Youssef, PharmD [facility] Pharmacy Director   Jacquie Israel,

## 2020-05-18 NOTE — TELEPHONE ENCOUNTER
Called and left message requesting pt call back to [phone]. If no answer, call the RN hotline at [phone].    Tere Diamond RN  [facility]

## 2020-07-17 NOTE — PROGRESS NOTES
Subjective     Gloria Sun is a 29 year old female who presents to clinic today for the following health issues:    HPI     Yeast inf under arm- bilateral  Onset:a month ago    Description:   Location:  under arm- bilateral  Character: blotchy, flakey, red, looks like cyst  Itching (Pruritis): YES    Progression of Symptoms:  improving    Accompanying Signs & Symptoms:  Fever: no   Body aches or joint pain: no   Sore throat symptoms: no   Recent cold symptoms: no     History:   Previous similar rash: YES- going on for 6 years    Precipitating factors:   Exposure to similar rash: no   New exposures: None   Recent travel: no     Alleviating factors:  Showering a lot    Therapies Tried and outcome: Diflucan 150 mg and helped    Asthma Follow-Up    Was ACT completed today?    Yes    ACT Total Scores 7/20/2020   ACT TOTAL SCORE -   ASTHMA ER VISITS -   ASTHMA HOSPITALIZATIONS -   ACT TOTAL SCORE (Goal Greater than or Equal to 20) 21   In the past 12 months, how many times did you visit the emergency room for your asthma without being admitted to the hospital? 0   In the past 12 months, how many times were you hospitalized overnight because of your asthma? 0         How many days per week do you miss taking your asthma controller medication?  I do not have an asthma controller medication    Please describe any recent triggers for your asthma: None    Have you had any Emergency Room Visits, Urgent Care Visits, or Hospital Admissions since your last office visit?  No      Patient Active Problem List   Diagnosis     CARDIOVASCULAR SCREENING; LDL GOAL LESS THAN 160     Chronic jaw pain     Status post gastric banding     Intermittent asthma, uncomplicated     Health Care Home     Cervical high risk HPV (human papillomavirus) test positive     Morbid obesity (H)     Major depressive disorder, recurrent episode, mild (H)     Migraines     Past Surgical History:   Procedure Laterality Date     ESOPHAGOSCOPY, GASTROSCOPY,  DUODENOSCOPY (EGD), COMBINED Left 12/31/2014    Procedure: COMBINED ESOPHAGOSCOPY, GASTROSCOPY, DUODENOSCOPY (EGD), BIOPSY SINGLE OR MULTIPLE;  Surgeon: Ilan Marrero MD;  Location: [facility]     LAPAROSCOPIC CHOLECYSTECTOMY  2/21/2014    Procedure: LAPAROSCOPIC CHOLECYSTECTOMY;  Laparoscopic Cholecystectomy;  Surgeon: Ilan Marrero MD;  Location: [facility] OR     LAPAROSCOPIC GASTRIC SLEEVE  1/21/2013    Procedure: LAPAROSCOPIC GASTRIC SLEEVE;  Laparoscopic Sleeve Gastrectomy;  Surgeon: Chato Mathews MD;  Location: [facility] OR     MOUTH SURGERY  2009       Social History     Tobacco Use     Smoking status: Never Smoker     Smokeless tobacco: Never Used   Substance Use Topics     Alcohol use: No     Alcohol/week: 0.0 standard drinks     Family History   Problem Relation Age of Onset     Lipids Mother      Anxiety Disorder Mother      Depression Mother      Hypertension Mother      Obesity Mother      Cancer Father         skin     Diabetes Father      Hypertension Father      Obesity Father      Hypertension Maternal Grandmother      C.A.D. Maternal Grandmother         CABG     Lipids Maternal Grandmother      Depression Maternal Grandmother      Obesity Maternal Grandmother      C.A.D. Maternal Grandfather         CABG, [place]      Asthma Maternal Grandfather      Cancer Maternal Grandfather      Respiratory Maternal Grandfather      Lipids Maternal Grandfather      Hypertension Maternal Grandfather      Alzheimer Disease Maternal Grandfather      Depression Maternal Grandfather      Obesity Maternal Grandfather      Cerebrovascular Disease Paternal Grandmother      Arthritis Paternal Grandmother         d. lupus     Obesity Paternal Grandmother      Heart Disease Paternal Grandfather      Lipids Paternal Grandfather      Thyroid Disease No family hx of      Glaucoma No family hx of      Macular Degeneration No family hx of            Reviewed and updated as needed this visit by Provider         Review of Systems  "  Constitutional, HEENT, cardiovascular, pulmonary, gi and gu systems are negative, except as otherwise noted.      Objective    /62   Pulse 63   Temp 98.5  F (36.9  C) (Oral)   Resp 18   Ht 1.588 m (5' 2.52\")   Wt 104.8 kg (231 lb)   SpO2 99%   BMI 41.55 kg/m    Body mass index is 41.55 kg/m .  Physical Exam   GENERAL: healthy, alert and no distress  NECK: no adenopathy and thyroid normal to palpation  RESP: lungs clear to auscultation - no rales, rhonchi or wheezes  CV: regular rate and rhythm, no murmur, click or rub, no peripheral edema   MS: no gross musculoskeletal defects noted, no edema  AXILLAE: Scattered healing erythematous bump with healed dark spots     Assessment & Plan     Gloria was seen today for derm problem.    Diagnoses and all orders for this visit:    Hidradenitis axillaris    Discussed pathophysiology of the condition and fact that can be intractable to treat sometimes. Evaluation by derm  -     DERMATOLOGY REFERRAL    Intermittent asthma, uncomplicated  -     albuterol (VENTOLIN HFA) 108 (90 Base) MCG/ACT inhaler; INHALE 2 PUFFS INTO LUNG EVERY 4 HOURS AS NEEDED FOR SHORTNESS OF BREATH/DYSPNEA/WHEEZING    BMI:   Estimated body mass index is 41.55 kg/m  as calculated from the following:    Height as of this encounter: 1.588 m (5' 2.52\").    Weight as of this encounter: 104.8 kg (231 lb).   Weight management plan: Discussed healthy diet and exercise guidelines    Return in about 3 months (around 10/20/2020) for Physical Exam.    Henry Paredes MD  [facility]    "

## 2020-07-20 ENCOUNTER — OFFICE VISIT (OUTPATIENT)
Dept: FAMILY MEDICINE | Facility: CLINIC | Age: 30
End: 2020-07-20
Payer: COMMERCIAL

## 2020-07-20 VITALS
HEIGHT: 63 IN | SYSTOLIC BLOOD PRESSURE: 100 MMHG | HEART RATE: 63 BPM | RESPIRATION RATE: 18 BRPM | WEIGHT: 231 LBS | OXYGEN SATURATION: 99 % | BODY MASS INDEX: 40.93 KG/M2 | DIASTOLIC BLOOD PRESSURE: 62 MMHG | TEMPERATURE: 98.5 F

## 2020-07-20 DIAGNOSIS — L73.2 HIDRADENITIS AXILLARIS: Primary | ICD-10-CM

## 2020-07-20 DIAGNOSIS — J45.20 INTERMITTENT ASTHMA, UNCOMPLICATED: ICD-10-CM

## 2020-07-20 PROCEDURE — 99213 OFFICE O/P EST LOW 20 MIN: CPT | Performed by: FAMILY MEDICINE

## 2020-07-20 RX ORDER — ALBUTEROL SULFATE 90 UG/1
AEROSOL, METERED RESPIRATORY (INHALATION)
Qty: 3 INHALER | Refills: 1 | Status: SHIPPED | OUTPATIENT
Start: 2020-07-20 | End: 2021-04-21

## 2020-07-20 ASSESSMENT — MIFFLIN-ST. JEOR: SCORE: 1739.32

## 2020-07-20 ASSESSMENT — PATIENT HEALTH QUESTIONNAIRE - PHQ9: SUM OF ALL RESPONSES TO PHQ QUESTIONS 1-9: 2

## 2020-07-20 ASSESSMENT — PAIN SCALES - GENERAL: PAINLEVEL: NO PAIN (0)

## 2020-07-20 NOTE — PATIENT INSTRUCTIONS
Patient Education     Understanding Hidradenitis Suppurativa  Hidradenitis suppurativa is a long-term (chronic) skin disease. It causes painful bumps and sores (abscesses) to form around a hair follicle. Follicles are the tiny holes from which hair grows out of your skin. The disease occurs on parts of the body where skin rubs together. It most often appears in the armpits, the groin area, and under the breasts. It is more common in women.  How to say it  [id number]   What causes hidradenitis suppurativa?  This skin disease happens when hair follicles become clogged with keratin. Keratin is the protein that makes up your hair and nails. The follicles then burst and become infected. Pressure or rubbing on the skin can clog the follicles. Or it can further irritate them.  The disease tends to run in families. It s also more likely to occur in people who are obese, have diabetes, or smoke. Hormones may also play a part.  Symptoms of hidradenitis suppurativa  This skin disease causes one or more painful red bumps on the skin. These bumps become infected and drain pus. They may also itch or burn. In severe cases, sinus tracts may form. These are narrow channels that run under the skin. Blood or a bad-smelling pus may ooze from these bumps or sinus tracts. Bands of scarring often occur.  Treatment for hidradenitis suppurativa  Treatment for this skin disease is most successful when started early. But it may be hard to diagnose. It may be mistaken for other skin conditions. The painful bumps also often return. So stopping new bumps and limiting scarring is important. Treatment options include:    Warm compress. Putting a warm, wet washcloth on the affected skin may help.    Lifestyle changes. Your symptoms may get better if you lose weight or stop smoking, if needed. Also avoid shaving or other irritants, such as deodorant or perfume.    Antibiotics. For mild cases, an antibiotic for the skin  (topical) may help. You may need oral antibiotics if you have a severe case. They can help prevent further infection.    Other oral medicines. Over-the-counter pain medicines can ease pain and inflammation. You may need stronger medicines for a severe case. These medicines include corticosteroids or a retinoid. These may cause side effects.    Injected medicines. A steroid may be injected into the bump to ease pain. A biologic may be injected to ease severe symptoms.    Surgery. Surgery can drain and remove the painful bumps. For severe cases, the doctor may cut out the entire area of affected skin or destroy it with a laser.  Complications of hidradenitis suppurativa  These include:    Arthritis    Depression    Lymphedema    Scarring of skin    Skin cancer  When to call your healthcare provider  Call your healthcare provider right away if you have any of these:    Fever of 100.4 F (38 C) or higher, or as directed    Redness, swelling, or fluid leaking from your rash that gets worse    Pain that gets worse    Symptoms that don t get better, or get worse    New symptoms   Date Last Reviewed: 5/1/2016 2000-2019 [employer]. [address] [place] [address], [place] [address]. All rights reserved. This information is not intended as a substitute for professional medical care. Always follow your healthcare professional's instructions.

## 2020-07-21 ASSESSMENT — ASTHMA QUESTIONNAIRES: ACT_TOTALSCORE: 21

## 2020-08-06 ENCOUNTER — ALLIED HEALTH/NURSE VISIT (OUTPATIENT)
Dept: NURSING | Facility: CLINIC | Age: 30
End: 2020-08-06
Payer: COMMERCIAL

## 2020-08-06 VITALS — DIASTOLIC BLOOD PRESSURE: 68 MMHG | SYSTOLIC BLOOD PRESSURE: 110 MMHG

## 2020-08-06 DIAGNOSIS — Z30.9 CONTRACEPTIVE MANAGEMENT: Primary | ICD-10-CM

## 2020-08-06 PROCEDURE — 96372 THER/PROPH/DIAG INJ SC/IM: CPT

## 2020-08-06 RX ADMIN — MEDROXYPROGESTERONE ACETATE 150 MG: 150 INJECTION, SUSPENSION INTRAMUSCULAR at 08:22

## 2020-08-06 NOTE — PROGRESS NOTES
Clinic Administered Medication Documentation      Depo Provera Documentation    URINE HCG: not indicated    Depo-Provera Standing Order inclusion/exclusion criteria reviewed.   Patient meets: inclusion criteria     BP: 110/68  LAST PAP/EXAM:   Lab Results   Component Value Date    PAP NIL 10/17/2019       Prior to injection, verified patient identity using patient's name and date of birth. Medication was administered. Please see MAR and medication order for additional information.     Was entire vial of medication used? Yes  Vial/Syringe: Single dose vial  Expiration Date:  02/2022  Given in right deltoid    Patient instructed to remain in clinic for 15 minutes and report any adverse reaction to staff immediately .  NEXT INJECTION DUE: 10/22/20 - 11/5/20  Tere ARREAGA CMA ([facility])

## 2020-12-01 ENCOUNTER — ANCILLARY PROCEDURE (OUTPATIENT)
Dept: GENERAL RADIOLOGY | Facility: CLINIC | Age: 30
End: 2020-12-01
Attending: PHYSICIAN ASSISTANT
Payer: COMMERCIAL

## 2020-12-01 ENCOUNTER — OFFICE VISIT (OUTPATIENT)
Dept: FAMILY MEDICINE | Facility: CLINIC | Age: 30
End: 2020-12-01
Payer: COMMERCIAL

## 2020-12-01 VITALS
TEMPERATURE: 97.4 F | RESPIRATION RATE: 18 BRPM | DIASTOLIC BLOOD PRESSURE: 72 MMHG | WEIGHT: 204.4 LBS | OXYGEN SATURATION: 98 % | HEART RATE: 64 BPM | BODY MASS INDEX: 36.77 KG/M2 | SYSTOLIC BLOOD PRESSURE: 116 MMHG

## 2020-12-01 DIAGNOSIS — T74.91XA DOMESTIC VIOLENCE OF ADULT, INITIAL ENCOUNTER: ICD-10-CM

## 2020-12-01 DIAGNOSIS — M53.3 PAIN IN THE COCCYX: ICD-10-CM

## 2020-12-01 DIAGNOSIS — T74.91XA DOMESTIC VIOLENCE OF ADULT, INITIAL ENCOUNTER: Primary | ICD-10-CM

## 2020-12-01 PROCEDURE — 72220 X-RAY EXAM SACRUM TAILBONE: CPT | Performed by: RADIOLOGY

## 2020-12-01 PROCEDURE — 99214 OFFICE O/P EST MOD 30 MIN: CPT | Performed by: PHYSICIAN ASSISTANT

## 2020-12-01 NOTE — PROGRESS NOTES
"SUBJECTIVE:                                                    Gloria Sun is a 30 year old female who presents to clinic today for the following health issues:    Back Pain      Duration: ***        Specific cause: {.:005464::\"none\"}    Description:   Location of pain: {.:602587}  Character of pain: {.:132090}  Pain radiation:{.:234450::\"none\"}  New numbness or weakness in legs, not attributed to pain:  { :999224::\"no\"}    Intensity: {.:843851::\"Currently ***/10\"}    History:   Pain interferes with job: {.:084805::\"***\"}  History of back problems: {.:768620::\"no prior back problems\"}  Any previous MRI or X-rays: {.:860705::\"None\"}  Sees a specialist for back pain:  { :440724::\"No\"}  Therapies tried without relief: {.:293661}    Alleviating factors:   Improved by: {.:890576}      Precipitating factors:  Worsened by: {.:880586::\"Nothing\"}          Problem list and histories reviewed & adjusted, as indicated.  Additional history: as documented    Patient Active Problem List   Diagnosis     CARDIOVASCULAR SCREENING; LDL GOAL LESS THAN 160     Chronic jaw pain     Status post gastric banding     Intermittent asthma, uncomplicated     Health Care Home     Cervical high risk HPV (human papillomavirus) test positive     Morbid obesity (H)     Major depressive disorder, recurrent episode, mild (H)     Migraines     Past Surgical History:   Procedure Laterality Date     ESOPHAGOSCOPY, GASTROSCOPY, DUODENOSCOPY (EGD), COMBINED Left 12/31/2014    Procedure: COMBINED ESOPHAGOSCOPY, GASTROSCOPY, DUODENOSCOPY (EGD), BIOPSY SINGLE OR MULTIPLE;  Surgeon: Ilan Marrero MD;  Location:  GI     LAPAROSCOPIC CHOLECYSTECTOMY  2/21/2014    Procedure: LAPAROSCOPIC CHOLECYSTECTOMY;  Laparoscopic Cholecystectomy;  Surgeon: Ilan Marrero MD;  Location:  OR     LAPAROSCOPIC GASTRIC SLEEVE  1/21/2013    Procedure: LAPAROSCOPIC GASTRIC SLEEVE;  Laparoscopic Sleeve Gastrectomy;  Surgeon: Chato Mathews MD;  Location: " [facility] OR     MOUTH SURGERY  2009       Social History     Tobacco Use     Smoking status: Never Smoker     Smokeless tobacco: Never Used   Substance Use Topics     Alcohol use: No     Alcohol/week: 0.0 standard drinks     Family History   Problem Relation Age of Onset     Lipids Mother      Anxiety Disorder Mother      Depression Mother      Hypertension Mother      Obesity Mother      Cancer Father         skin     Diabetes Father      Hypertension Father      Obesity Father      Hypertension Maternal Grandmother      C.A.D. Maternal Grandmother         CABG     Lipids Maternal Grandmother      Depression Maternal Grandmother      Obesity Maternal Grandmother      C.A.D. Maternal Grandfather         CABG, [place]      Asthma Maternal Grandfather      Cancer Maternal Grandfather      Respiratory Maternal Grandfather      Lipids Maternal Grandfather      Hypertension Maternal Grandfather      Alzheimer Disease Maternal Grandfather      Depression Maternal Grandfather      Obesity Maternal Grandfather      Cerebrovascular Disease Paternal Grandmother      Arthritis Paternal Grandmother         d. lupus     Obesity Paternal Grandmother      Heart Disease Paternal Grandfather      Lipids Paternal Grandfather      Thyroid Disease No family hx of      Glaucoma No family hx of      Macular Degeneration No family hx of          Current Outpatient Medications   Medication Sig Dispense Refill     Acetaminophen (TYLENOL PO) Take by mouth as needed for mild pain or fever       albuterol (PROVENTIL) (2.5 MG/3ML) 0.083% neb solution INHALE 1 VIAL VIA NEBULIZATION EVERY 6 HOURS AS NEEDED FOR SHORTNESS OF BREATH OR WHEEZING 75 mL 0     albuterol (VENTOLIN HFA) 108 (90 Base) MCG/ACT inhaler INHALE 2 PUFFS INTO LUNG EVERY 4 HOURS AS NEEDED FOR SHORTNESS OF BREATH/DYSPNEA/WHEEZING 3 Inhaler 1     citalopram (CELEXA) 40 MG tablet Take 1 tablet (40 mg) by mouth daily 90 tablet 1     fluticasone (FLONASE) 50 MCG/ACT nasal spray Spray 1 spray  "into both nostrils daily (Patient not taking: Reported on 7/20/2020) 16 g 0     meclizine (ANTIVERT) 25 MG tablet Take 1 tablet (25 mg) by mouth 3 times daily as needed for dizziness (Patient not taking: Reported on 7/20/2020) 30 tablet 0     medroxyPROGESTERone (DEPO-PROVERA) 150 MG/ML IM injection Inject 1 mL (150 mg) into the muscle every 3 months 1 mL 3     medroxyPROGESTERone (DEPO-PROVERA) 150 MG/ML injection Inject 1 mL (150 mg) into the muscle 1 mL 3     omeprazole (PRILOSEC) 40 MG capsule Take 1 capsule (40 mg) by mouth daily 30 capsule 5     ondansetron (ZOFRAN) 4 MG tablet Take 1 tablet (4 mg) by mouth every 8 hours as needed for nausea (Patient not taking: Reported on 7/20/2020) 30 tablet 0     ranitidine (ZANTAC) 150 MG capsule Take 1 capsule (150 mg) by mouth 2 times daily (Patient not taking: Reported on 7/20/2020) 30 capsule 3     traZODone (DESYREL) 50 MG tablet TAKE 1-2 TABLETS BY MOUTH EVERY NIGHT AT BEDTIME AS NEEDED FOR SLEEP 60 tablet 3     Allergies   Allergen Reactions     Codeine Nausea and Vomiting     Desogen [Apri]      Hot flashes     Desogestrel-Ethinyl Estradiol Nausea     Dust Mites      Fluoxetine      Irritable, easy bruising     Magnesium Sulfate Injection      Burning, hotflashes     Medroxyprogesterone Unknown     Swelling at injection site     Morphine      \"Weight loss surgery so it burns the inside of my stomach\"     Venlafaxine      Agitation, anxiety      Problem list, Medication list, Allergies, and Medical/Social/Surgical histories reviewed in [facility] and updated as appropriate.    ROS:  {ROS - brief:276841::\"C: NEGATIVE for fever, chills, change in weight\",\"E/M: NEGATIVE for ear, mouth and throat problems\",\"R: NEGATIVE for significant cough or SOB\",\"CV: NEGATIVE for chest pain, palpitations or peripheral edema\"}    OBJECTIVE:                                                    There were no vitals taken for this visit.  There is no height or weight on file to calculate BMI. "   GENERAL: healthy, alert, well nourished, well hydrated, no distress  RESP: lungs clear to auscultation - no rales, no rhonchi, no wheezes  CV: regular rates and rhythm, normal S1 S2, no S3 or S4 and no murmur, no click or rub -  ABDOMEN: soft, no tenderness, no  hepatosplenomegaly, no masses, normal bowel sounds  Lumber/Thoracic Spine Exam: Tender:  {THORACIC/LUMBAR SPINE TENDERNESS:898152}  Non-tender:  {THORACIC/LUMBAR SPINE TENDERNESS:479244}  Range of Motion:  full range of motion lashell lower extremities   Strength:  5/5 lashell lower extremities   Special tests:  {FSOC THORACIC/LUMBAR SPINE SPECIAL TESTS:464323}  Hip Exam: {FSOC SHORT HIP:002153}    {Diagnostic Test Results:428035}     ASSESSMENT/PLAN:                                                    No diagnosis found.    {FOLLOW UP CLINIC OPTIONS:978781}    KEV Doss [facility]

## 2020-12-01 NOTE — LETTER
"My Asthma Action Plan    Name: Gloria Sun   YOB: 1990  Date: 12/1/2020   My doctor: Fransisco Norris PA-C   My clinic: [facility]        My Rescue Medicine:   Albuterol inhaler (Proair/Ventolin/Proventil HFA)  2-4 puffs EVERY 4 HOURS as needed. Use a spacer if recommended by your provider.   My Asthma Severity:   { :437262::\"Intermittent / Exercise Induced\"}  Know your asthma triggers: { :217309}  None          GREEN ZONE   Good Control    I feel good    No cough or wheeze    Can work, sleep and play without asthma symptoms       Take your asthma control medicine every day.     1. If exercise triggers your asthma, take your rescue medication    15 minutes before exercise or sports, and    During exercise if you have asthma symptoms  2. Spacer to use with inhaler: If you have a spacer, make sure to use it with your inhaler             YELLOW ZONE Getting Worse  I have ANY of these:    I do not feel good    Cough or wheeze    Chest feels tight    Wake up at night   1. Keep taking your Green Zone medications  2. Start taking your rescue medicine:    every 20 minutes for up to 1 hour. Then every 4 hours for 24-48 hours.  3. If you stay in the Yellow Zone for more than 12-24 hours, contact your doctor.  4. If you do not return to the Green Zone in 12-24 hours or you get worse, start taking your oral steroid medicine if prescribed by your provider.           RED ZONE Medical Alert - Get Help  I have ANY of these:    I feel awful    Medicine is not helping    Breathing getting harder    Trouble walking or talking    Nose opens wide to breathe       1. Take your rescue medicine NOW  2. If your provider has prescribed an oral steroid medicine, start taking it NOW  3. Call your doctor NOW  4. If you are still in the Red Zone after 20 minutes and you have not reached your doctor:    Take your rescue medicine again and    Call 911 or go to the emergency room right away    See your " regular doctor within 2 weeks of an Emergency Room or Urgent Care visit for follow-up treatment.          Annual Reminders:  Meet with Asthma Educator,  Flu Shot in the Fall, consider Pneumonia Vaccination for patients with asthma (aged 19 and older).    Pharmacy: [id number] - [place], [place] - [address]    Electronically signed by Fransisco Norris PA-C   Date: 12/01/20                    Asthma Triggers  How To Control Things That Make Your Asthma Worse    Triggers are things that make your asthma worse.  Look at the list below to help you find your triggers and   what you can do about them. You can help prevent asthma flare-ups by staying away from your triggers.      Trigger                                                          What you can do   Cigarette Smoke  Tobacco smoke can make asthma worse. Do not allow smoking in your home, car or around you.  Be sure no one smokes at a child s day care or school.  If you smoke, ask your health care provider for ways to help you quit.  Ask family members to quit too.  Ask your health care provider for a referral to Quit Plan to help you quit smoking, or call 0-974-850PLAN.     Colds, Flu, Bronchitis  These are common triggers of asthma. Wash your hands often.  Don t touch your eyes, nose or mouth.  Get a flu shot every year.     Dust Mites  These are tiny bugs that live in cloth or carpet. They are too small to see. Wash sheets and blankets in hot water every week.   Encase pillows and mattress in dust mite proof covers.  Avoid having carpet if you can. If you have carpet, vacuum weekly.   Use a dust mask and HEPA vacuum.   Pollen and Outdoor Mold  Some people are allergic to trees, grass, or weed pollen, or molds. Try to keep your windows closed.  Limit time out doors when pollen count is high.   Ask you health care provider about taking medicine during allergy season.     Animal Dander  Some people are allergic to skin flakes, urine or saliva  from pets with fur or feathers. Keep pets with fur or feathers out of your home.    If you can t keep the pet outdoors, then keep the pet out of your bedroom.  Keep the bedroom door closed.  Keep pets off cloth furniture and away from stuffed toys.     Mice, Rats, and Cockroaches  Some people are allergic to the waste from these pests.   Cover food and garbage.  Clean up spills and food crumbs.  Store grease in the refrigerator.   Keep food out of the bedroom.   Indoor Mold  This can be a trigger if your home has high moisture. Fix leaking faucets, pipes, or other sources of water.   Clean moldy surfaces.  Dehumidify basement if it is damp and smelly.   Smoke, Strong Odors, and Sprays  These can reduce air quality. Stay away from strong odors and sprays, such as perfume, powder, hair spray, paints, smoke incense, paint, cleaning products, candles and new carpet.   Exercise or Sports  Some people with asthma have this trigger. Be active!  Ask your doctor about taking medicine before sports or exercise to prevent symptoms.    Warm up for 5-10 minutes before and after sports or exercise.     Other Triggers of Asthma  Cold air:  Cover your nose and mouth with a scarf.  Sometimes laughing or crying can be a trigger.  Some medicines and food can trigger asthma.
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
## 2020-12-02 NOTE — RESULT ENCOUNTER NOTE
Ms. Sun,    Your x-ray was read as normal by the radiologist.     Please contact the clinic if you have additional questions.  Thank you.    Sincerely,    Fransisco Norris PA-C

## 2020-12-03 ENCOUNTER — TELEPHONE (OUTPATIENT)
Dept: FAMILY MEDICINE | Facility: CLINIC | Age: 30
End: 2020-12-03
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
## 2020-12-03 NOTE — TELEPHONE ENCOUNTER
Called the patient @ [phone]. In regards to her request I stated that we have a protocol in place for receiving any medical records. I stated she would need to fill out a YUDI at any [facility]  and they ([facility]) can then release those records to her in the fashion in which she would like to receive them as indicated on the form. I explained we could not just print them off and give them to her. She understood. I also mentioned that she could request records on [email or web address] as another option.  Marlen Sewell TC

## 2020-12-10 ENCOUNTER — TELEPHONE (OUTPATIENT)
Dept: FAMILY MEDICINE | Facility: CLINIC | Age: 30
End: 2020-12-10

## 2020-12-10 NOTE — TELEPHONE ENCOUNTER
Radiology department contacted TC that the patient had filled out a YUDI. The CD was burned by the technician and brought to the team. I have called the patient @ [phone] to let her know this has been completed. I have placed the CD for her to  at the [facility] .  SHYAM Domingo

## 2020-12-13 ENCOUNTER — HEALTH MAINTENANCE LETTER (OUTPATIENT)
Age: 30
End: 2020-12-13

## 2020-12-27 ENCOUNTER — OFFICE VISIT (OUTPATIENT)
Dept: URGENT CARE | Facility: URGENT CARE | Age: 30
End: 2020-12-27
Payer: COMMERCIAL

## 2020-12-27 VITALS
DIASTOLIC BLOOD PRESSURE: 72 MMHG | SYSTOLIC BLOOD PRESSURE: 109 MMHG | TEMPERATURE: 98.3 F | BODY MASS INDEX: 36.79 KG/M2 | HEART RATE: 52 BPM | OXYGEN SATURATION: 100 % | HEIGHT: 63 IN

## 2020-12-27 DIAGNOSIS — Z30.012 ENCOUNTER FOR PRESCRIPTION OF EMERGENCY CONTRACEPTION: ICD-10-CM

## 2020-12-27 DIAGNOSIS — N91.2 AMENORRHEA: Primary | ICD-10-CM

## 2020-12-27 LAB — HCG UR QL: NEGATIVE

## 2020-12-27 PROCEDURE — 96372 THER/PROPH/DIAG INJ SC/IM: CPT | Performed by: FAMILY MEDICINE

## 2020-12-27 PROCEDURE — 81025 URINE PREGNANCY TEST: CPT | Performed by: FAMILY MEDICINE

## 2020-12-27 PROCEDURE — 99214 OFFICE O/P EST MOD 30 MIN: CPT | Mod: 25 | Performed by: FAMILY MEDICINE

## 2020-12-27 RX ORDER — MEDROXYPROGESTERONE ACETATE 150 MG/ML
150 INJECTION, SUSPENSION INTRAMUSCULAR
Status: DISCONTINUED | OUTPATIENT
Start: 2020-12-27 | End: 2021-04-21

## 2020-12-27 RX ADMIN — MEDROXYPROGESTERONE ACETATE 150 MG: 150 INJECTION, SUSPENSION INTRAMUSCULAR at 12:14
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
## 2020-12-27 NOTE — PROGRESS NOTES
Due to injection administration, patient instructed to remain in clinic for 15 minutes  afterwards, and to report any adverse reaction to me immediately.      Lina Lutz, CMA

## 2021-01-06 ENCOUNTER — PATIENT OUTREACH (OUTPATIENT)
Dept: FAMILY MEDICINE | Facility: CLINIC | Age: 31
End: 2021-01-06

## 2021-04-14 ENCOUNTER — OFFICE VISIT (OUTPATIENT)
Dept: URGENT CARE | Facility: URGENT CARE | Age: 31
End: 2021-04-14
Payer: COMMERCIAL

## 2021-04-14 ENCOUNTER — NURSE TRIAGE (OUTPATIENT)
Dept: FAMILY MEDICINE | Facility: CLINIC | Age: 31
End: 2021-04-14

## 2021-04-14 VITALS
OXYGEN SATURATION: 100 % | BODY MASS INDEX: 34.92 KG/M2 | RESPIRATION RATE: 16 BRPM | WEIGHT: 194 LBS | SYSTOLIC BLOOD PRESSURE: 113 MMHG | HEART RATE: 56 BPM | DIASTOLIC BLOOD PRESSURE: 73 MMHG | TEMPERATURE: 97.9 F

## 2021-04-14 DIAGNOSIS — R23.3 SPONTANEOUS ECCHYMOSIS: Primary | ICD-10-CM

## 2021-04-14 LAB
ALBUMIN SERPL-MCNC: 3.8 G/DL (ref 3.4–5)
ALP SERPL-CCNC: 90 U/L (ref 40–150)
ALT SERPL W P-5'-P-CCNC: 26 U/L (ref 0–50)
ANION GAP SERPL CALCULATED.3IONS-SCNC: 4 MMOL/L (ref 3–14)
APTT PPP: 35 SEC (ref 22–37)
AST SERPL W P-5'-P-CCNC: 11 U/L (ref 0–45)
BASOPHILS # BLD AUTO: 0 10E9/L (ref 0–0.2)
BASOPHILS NFR BLD AUTO: 0.1 %
BILIRUB SERPL-MCNC: 1.4 MG/DL (ref 0.2–1.3)
BUN SERPL-MCNC: 12 MG/DL (ref 7–30)
CALCIUM SERPL-MCNC: 8.4 MG/DL (ref 8.5–10.1)
CHLORIDE SERPL-SCNC: 109 MMOL/L (ref 94–109)
CO2 SERPL-SCNC: 25 MMOL/L (ref 20–32)
CREAT SERPL-MCNC: 0.68 MG/DL (ref 0.52–1.04)
DIFFERENTIAL METHOD BLD: NORMAL
EOSINOPHIL # BLD AUTO: 0.1 10E9/L (ref 0–0.7)
EOSINOPHIL NFR BLD AUTO: 1.4 %
ERYTHROCYTE [DISTWIDTH] IN BLOOD BY AUTOMATED COUNT: 12.6 % (ref 10–15)
GFR SERPL CREATININE-BSD FRML MDRD: >90 ML/MIN/{1.73_M2}
GLUCOSE SERPL-MCNC: 58 MG/DL (ref 70–99)
HCT VFR BLD AUTO: 39.9 % (ref 35–47)
HGB BLD-MCNC: 13.2 G/DL (ref 11.7–15.7)
INR PPP: 1.05 (ref 0.86–1.14)
LYMPHOCYTES # BLD AUTO: 3.4 10E9/L (ref 0.8–5.3)
LYMPHOCYTES NFR BLD AUTO: 46.1 %
MCH RBC QN AUTO: 30.5 PG (ref 26.5–33)
MCHC RBC AUTO-ENTMCNC: 33.1 G/DL (ref 31.5–36.5)
MCV RBC AUTO: 92 FL (ref 78–100)
MONOCYTES # BLD AUTO: 0.7 10E9/L (ref 0–1.3)
MONOCYTES NFR BLD AUTO: 9.1 %
NEUTROPHILS # BLD AUTO: 3.2 10E9/L (ref 1.6–8.3)
NEUTROPHILS NFR BLD AUTO: 43.3 %
PLATELET # BLD AUTO: 227 10E9/L (ref 150–450)
POTASSIUM SERPL-SCNC: 3.6 MMOL/L (ref 3.4–5.3)
PROT SERPL-MCNC: 6.9 G/DL (ref 6.8–8.8)
RBC # BLD AUTO: 4.33 10E12/L (ref 3.8–5.2)
SODIUM SERPL-SCNC: 138 MMOL/L (ref 133–144)
WBC # BLD AUTO: 7.4 10E9/L (ref 4–11)

## 2021-04-14 PROCEDURE — 85610 PROTHROMBIN TIME: CPT | Performed by: FAMILY MEDICINE

## 2021-04-14 PROCEDURE — 99214 OFFICE O/P EST MOD 30 MIN: CPT | Performed by: FAMILY MEDICINE

## 2021-04-14 PROCEDURE — 85730 THROMBOPLASTIN TIME PARTIAL: CPT | Performed by: FAMILY MEDICINE

## 2021-04-14 PROCEDURE — 80053 COMPREHEN METABOLIC PANEL: CPT | Performed by: FAMILY MEDICINE

## 2021-04-14 PROCEDURE — 85025 COMPLETE CBC W/AUTO DIFF WBC: CPT | Performed by: FAMILY MEDICINE

## 2021-04-14 PROCEDURE — 36415 COLL VENOUS BLD VENIPUNCTURE: CPT | Performed by: FAMILY MEDICINE

## 2021-04-14 ASSESSMENT — PAIN SCALES - GENERAL: PAINLEVEL: NO PAIN (0)

## 2021-04-14 NOTE — TELEPHONE ENCOUNTER
"Patient reports she received the first dose of the Moderna Vaccine, 3 weeks ago.  Patient reports, her legs are turning and abdominal area is turning black and blue.  Patient reports she has multiple areas of bruising since     Additional Information    Negative: Shock suspected (e.g., cold/pale/clammy skin, too weak to stand, low BP, rapid pulse)    Negative: Sounds like a life-threatening emergency to the triager    Negative: Bruises with fever    Negative: Tiny bruises (spots or dots) of unknown cause    Negative: Bruise(s) of forehead or head    Negative: Bruise(s) of face or jaw    Negative: Followed an injury, and triager doesn't know which injury guideline to use first    Negative: Post-operative bruising    Negative: Dizziness or lightheadedness    Negative: [1] Bruise on head/face, chest, or abdomen AND [2]  taking Coumadin (warfarin) or other strong blood thinner, or known bleeding disorder (e.g., thrombocytopenia)    Negative: Unexplained bleeding from another site (e.g., gums, nose, urine) as well    Negative: Patient sounds very sick or weak to the triager    Negative: [1] Not caused by an injury AND [2] 5 or more bruises now    [1] Raised bruise AND [2] size > 2 inches (5 cm) AND [3] getting bigger    Answer Assessment - Initial Assessment Questions  1. APPEARANCE of BRUISE: \"Describe the bruise.\"       Left leg with bruising, about 1/2 dollar size bruises x 2 and lower abdominal bruising, left side, 3-4 long bruise x 1  2. SIZE: \"How large is the bruise?\"       As above  3. NUMBER: \"How many bruises are there?\"       As above  4. LOCATION: \"Where is the bruise located?\"       As above  5. ONSET: \"How long ago did the bruise occur?\"       Started 2 days ago, Knots on lower abdomen x 2 weeks.  Denies recent injury  6. CAUSE: \"Tell me how it happened.\"      Appeared spontaneously  7. MEDICAL HISTORY: \"Do you have any medical problems that can cause easy bruising or bleeding?\" (e.g., leukemia, liver " "disease, recent chemotherapy)      ? Gilbert syndrome  8. MEDICATIONS : \"Do you take any medications which thin the blood such as: aspirin, heparin, ibuprofen (NSAIDS), Plavix, or Coumadin?\"      denies  9. OTHER SYMPTOMS: \"Do you have any other symptoms?\"  (e.g., weakness, dizziness, pain, fever, nosebleed, blood in urine/stool)      Blood when wiping from toilet paper.   10. PREGNANCY: \"Is there any chance you are pregnant?\" \"When was your last menstrual period?\"        Denies.    Protocols used: BRUISES-KRISTA      "
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
## 2021-04-21 ENCOUNTER — OFFICE VISIT (OUTPATIENT)
Dept: OBGYN | Facility: CLINIC | Age: 31
End: 2021-04-21
Payer: COMMERCIAL

## 2021-04-21 VITALS
DIASTOLIC BLOOD PRESSURE: 72 MMHG | TEMPERATURE: 96.5 F | OXYGEN SATURATION: 99 % | HEART RATE: 60 BPM | WEIGHT: 189.8 LBS | HEIGHT: 63 IN | BODY MASS INDEX: 33.63 KG/M2 | SYSTOLIC BLOOD PRESSURE: 109 MMHG

## 2021-04-21 DIAGNOSIS — R87.810 CERVICAL HIGH RISK HPV (HUMAN PAPILLOMAVIRUS) TEST POSITIVE: ICD-10-CM

## 2021-04-21 DIAGNOSIS — Z30.42 SURVEILLANCE FOR DEPO-PROVERA CONTRACEPTION: ICD-10-CM

## 2021-04-21 DIAGNOSIS — Z01.419 ENCOUNTER FOR GYNECOLOGICAL EXAMINATION WITHOUT ABNORMAL FINDING: Primary | ICD-10-CM

## 2021-04-21 LAB — HCG UR QL: NEGATIVE

## 2021-04-21 PROCEDURE — 96372 THER/PROPH/DIAG INJ SC/IM: CPT | Performed by: NURSE PRACTITIONER

## 2021-04-21 PROCEDURE — 87624 HPV HI-RISK TYP POOLED RSLT: CPT | Performed by: NURSE PRACTITIONER

## 2021-04-21 PROCEDURE — 81025 URINE PREGNANCY TEST: CPT | Performed by: NURSE PRACTITIONER

## 2021-04-21 PROCEDURE — 88141 CYTOPATH C/V INTERPRET: CPT | Performed by: PATHOLOGY

## 2021-04-21 PROCEDURE — 88175 CYTOPATH C/V AUTO FLUID REDO: CPT | Performed by: NURSE PRACTITIONER

## 2021-04-21 PROCEDURE — 99385 PREV VISIT NEW AGE 18-39: CPT | Mod: 25 | Performed by: NURSE PRACTITIONER

## 2021-04-21 RX ORDER — MEDROXYPROGESTERONE ACETATE 150 MG/ML
150 INJECTION, SUSPENSION INTRAMUSCULAR
Status: COMPLETED | OUTPATIENT
Start: 2021-04-21 | End: 2022-01-12

## 2021-04-21 RX ADMIN — MEDROXYPROGESTERONE ACETATE 150 MG: 150 INJECTION, SUSPENSION INTRAMUSCULAR at 08:42

## 2021-04-21 ASSESSMENT — PAIN SCALES - GENERAL: PAINLEVEL: NO PAIN (0)

## 2021-04-21 ASSESSMENT — MIFFLIN-ST. JEOR: SCORE: 1547.12

## 2021-04-21 NOTE — PROGRESS NOTES
Clinic Administered Medication Documentation      Depo Provera Documentation    URINE HCG: negative    Depo-Provera Standing Order inclusion/exclusion criteria reviewed.   Patient meets: inclusion criteria     BP: 109/72  LAST PAP/EXAM:   Lab Results   Component Value Date    PAP NIL 10/17/2019       Prior to injection, verified patient identity using patient's name and date of birth. Medication was administered. Please see MAR and medication order for additional information.     Was entire vial of medication used? Yes  Vial/Syringe: Single dose vial  Expiration Date:  12/2022    Patient instructed to remain in clinic for 15 minutes and report any adverse reaction to staff immediately .  NEXT INJECTION DUE: 7/7/21 - 7/21/21
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
## 2021-04-26 LAB
COPATH REPORT: ABNORMAL
PAP: ABNORMAL

## 2021-04-28 ENCOUNTER — PATIENT OUTREACH (OUTPATIENT)
Dept: OBGYN | Facility: CLINIC | Age: 31
End: 2021-04-28

## 2021-04-28 ENCOUNTER — VIRTUAL VISIT (OUTPATIENT)
Dept: FAMILY MEDICINE | Facility: CLINIC | Age: 31
End: 2021-04-28
Payer: COMMERCIAL

## 2021-04-28 DIAGNOSIS — J30.9 ALLERGIC RHINITIS, UNSPECIFIED SEASONALITY, UNSPECIFIED TRIGGER: ICD-10-CM

## 2021-04-28 DIAGNOSIS — J45.20 INTERMITTENT ASTHMA, UNCOMPLICATED: Primary | ICD-10-CM

## 2021-04-28 DIAGNOSIS — R87.810 CERVICAL HIGH RISK HPV (HUMAN PAPILLOMAVIRUS) TEST POSITIVE: ICD-10-CM

## 2021-04-28 DIAGNOSIS — R05.9 COUGH: ICD-10-CM

## 2021-04-28 PROCEDURE — 99213 OFFICE O/P EST LOW 20 MIN: CPT | Mod: 95 | Performed by: FAMILY MEDICINE

## 2021-04-28 ASSESSMENT — PAIN SCALES - GENERAL: PAINLEVEL: NO PAIN (0)

## 2021-04-28 NOTE — PROGRESS NOTES
"Gloria is a 30 year old who is being evaluated via a billable video visit.      How would you like to obtain your AVS? MyChart  If the video visit is dropped, the invitation should be resent by: Text to cell phone: [phone]  Will anyone else be joining your video visit? No      Video Start Time: 3.55 PM    Assessment & Plan     Cough  Assume covid  Quarantine 10 days  Go to ER if sob or worse  Discharge Instructions for COVID-19 Patients  You have--or may have--COVID-19. Please follow the instructions listed below.   If you have a weakened immune system, discuss with your doctor any other actions you need to take.  How can I protect others?  If you have symptoms (fever, cough, body aches or trouble breathing):    Stay home and away from others (self-isolate) until:  ? Your other symptoms have resolved (gotten better). And   ? You've had no fever--and no medicine that reduces fever--for 1 full day (24 hours). And   ? At least 10 days have passed since your symptoms started. (You may need to wait 20 days. Follow the advice of your care team.)  If you don't show symptoms, but testing showed that you have COVID-19:    Stay home and away from others (self-isolate) until at least 10 days have passed since the date of your first positive COVID-19 test.  During this time    Stay in your own room, even for meals. Use your own bathroom if you can.    Stay away from others in your home. No hugging, kissing or shaking hands. No visitors.    Don't go to work, school or anywhere else.    Clean \"high touch\" surfaces often (doorknobs, counters, handles). Use household cleaning spray or wipes.    You'll find a full list of  on the EPA website: www.epa.gov/pesticide-registration/list-n-disinfectants-use-against-sars-cov-2.    Cover your mouth and nose with a mask or other face covering to avoid spreading germs.    Wash your hands and face often. Use soap and water.    Caregivers in these groups are at risk for severe " illness due to COVID-19:  ? People 65 years and older  ? People who live in a nursing home or long-term care facility  ? People with chronic disease (lung, heart, cancer, diabetes, kidney, liver, immunologic)  ? People who have a weakened immune system, including those who:    Are in cancer treatment    Take medicine that weakens the immune system, such as corticosteroids    Had a bone marrow or organ transplant    Have an immune deficiency    Have poorly controlled HIV or AIDS    Are obese (body mass index of 40 or higher)    Smoke regularly    Caregivers should wear gloves while washing dishes, handling laundry and cleaning bedrooms and bathrooms.    Use caution when washing and drying laundry: Don't shake dirty laundry and use the warmest water setting that you can.    For more tips on managing your health at home, go to www.cdc.gov/coronavirus/2019-ncov/downloads/10Things.pdf.  How can I take care of myself at home?  1. Get lots of rest. Drink extra fluids (unless a doctor has told you not to).  2. Take Tylenol (acetaminophen) for fever or pain. If you have liver or kidney problems, ask your family doctor if it's okay to take Tylenol.   Adults can take either:   ? 650 mg (two 325 mg pills) every 4 to 6 hours, or   ? 1,000 mg (two 500 mg pills) every 8 hours as needed.  ? Note: Don't take more than 3,000 mg in one day. Acetaminophen is found in many medicines (both prescribed and over-the-counter medicines). Read all labels to be sure you don't take too much.   For children, check the Tylenol bottle for the right dose. The dose is based on the child's age or weight.  3. If you have other health problems (like cancer, heart failure, an organ transplant or severe kidney disease): Call your specialty clinic if you don't feel better in the next 2 days.  4. Know when to call 911. Emergency warning signs include:  ? Trouble breathing or shortness of breath  ? Pain or pressure in the chest that doesn't go  away  ? Feeling confused like you haven't felt before, or not being able to wake up  ? Bluish-colored lips or face  5. Your doctor may have prescribed a blood thinner medicine. Follow their instructions.  Where can I get more information?    [facility] - About COVID-19:   [email or web address]    CDC - What to Do If You're Sick: www.cdc.gov/coronavirus/2019-ncov/about/steps-when-sick.html    CDC - Ending Home Isolation: www.cdc.gov/coronavirus/2019-ncov/hcp/disposition-in-home-patients.html    [facility] - Caring for Someone: www.cdc.gov/coronavirus/2019-ncov/if-you-are-sick/care-for-someone.html    [facility] - Interim Guidance for Hospital Discharge to Home: www.health.[facility].[place].us/diseases/coronavirus/hcp/hospdischarge.pdf    Below are the COVID-19 hotlines at the [place] Department of Health ([facility]). Interpreters are available.  ? For health questions: Call 577-082-7745 or 1-353.638.9958 (7 a.m. to 7 p.m.)  ? For questions about schools and childcare: Call 770-556-9258 or 1-388.472.3163 (7 a.m. to 7 p.m.)    For informational purposes only. Not to replace the advice of your health care provider. Clinically reviewed by Dr. Orlando Castro.   Copyright   2020 [facility]. All rights reserved. [employer] [id number] - REV 01/05/21.      - Symptomatic COVID-19 Virus (Coronavirus) by PCR; Future    Intermittent asthma, uncomplicated  Use albuterol inh q 6 Hours PRN    Allergic rhinitis, unspecified seasonality, unspecified trigger  Use zyrtec and Flonase otc  No follow-ups on file.    Edda Pugh MD  [facility] VIRAJ Castro is a 30 year old who presents for the following health issues     HPI    Pt Took Moderna vaccine last week  Has had chills  Son has had cough and was negative for covid  He was exposed to covid at     Acute Illness: Sick  Onset/Duration: 2 days   Symptoms:  Fever: YES- 99.3  Chills/Sweats: YES-  Headache (location?): YES  Sinus Pressure: has  Runny nose  Conjunctivitis:  no  Ear Pain: no  Rhinorrhea: YES  Congestion: YES  Sore Throat: congestion  Cough: no  Wheeze: YES mild -has asthma  Decreased Appetite: YES  Nausea: no  Vomiting: no  Diarrhea: no  Dysuria/Freq.: no  Dysuria or Hematuria: no  Fatigue/Achiness: YES  Sick/Strep Exposure: YES- Son has been sick for 5 days. Today tested Covid and was negative.   Therapies tried and outcome: Tylenol, slight relief.   Pt had a negative rapid covid test today    No loss of hadley or smell  Has asthma      Review of Systems   Rest of the ROS is Negative except see above and Problem list [stable]        Objective    Vitals - Patient Reported  Pain Score: No Pain (0)        Physical Exam   GENERAL: Healthy, alert and no distress  EYES: Eyes grossly normal to inspection.  No discharge or erythema, or obvious scleral/conjunctival abnormalities.  RESP: No audible wheeze, cough, or visible cyanosis.  No visible retractions or increased work of breathing.    SKIN: Visible skin clear. No significant rash, abnormal pigmentation or lesions.  NEURO: Cranial nerves grossly intact.  Mentation and speech appropriate for age.  PSYCH: Mentation appears normal, affect normal/bright, judgement and insight intact, normal speech and appearance well-groomed.    Pending           Video-Visit Details    Type of service:  Video Visit    Video End Time:4.01 PM    Originating Location (pt. Location): Home    Distant Location (provider location):  [facility]     Platform used for Video Visit: [place]   4:09 PM

## 2021-04-29 ENCOUNTER — TELEPHONE (OUTPATIENT)
Dept: HEMATOLOGY | Facility: CLINIC | Age: 31
End: 2021-04-29

## 2021-04-29 NOTE — TELEPHONE ENCOUNTER
Gloria Sun was referred to our center for spontaneous bruising. She was originally scheduled to see a provider on 4.22.2021 but then had to cancel due to a conflict with provider schedule.  Reached out to patient today to see about scheduling.  She was frustrated with not being able to be seen last week, her bruising is now resolved and is unclear on the purpose of coming in for evaluation.  The referal was deleted for now but gave her our number to call if she has future problems with this.    One of Gloria's concerns was the timing of the bruising in relation to her COVID shot. She got the second COVID shot last week and she and her young child have both been sick. She did get tested for COVID and was negative.  RN did reinforce that the COVID vaccine does not contain live virus.    She says that the areas on her abdomen near the bruising came to a head and produced pus. She has had these skin cysts before.  Reinforced signs of cellulitis to watch for a recommended she see her primary care.  She agreed with plan.    Again RN expressed our apology that we did not see her in the time expected and will schedule her in the future if she chooses..      Kaylyn Dolan, RN - Nurse Clinician - [facility] - [phone]

## 2021-05-10 ENCOUNTER — OFFICE VISIT (OUTPATIENT)
Dept: OBGYN | Facility: CLINIC | Age: 31
End: 2021-05-10
Payer: COMMERCIAL

## 2021-05-10 ENCOUNTER — TELEPHONE (OUTPATIENT)
Dept: FAMILY MEDICINE | Facility: CLINIC | Age: 31
End: 2021-05-10

## 2021-05-10 VITALS
HEART RATE: 59 BPM | SYSTOLIC BLOOD PRESSURE: 114 MMHG | BODY MASS INDEX: 33.98 KG/M2 | DIASTOLIC BLOOD PRESSURE: 73 MMHG | WEIGHT: 188.8 LBS | OXYGEN SATURATION: 97 %

## 2021-05-10 DIAGNOSIS — R87.810 CERVICAL HIGH RISK HPV (HUMAN PAPILLOMAVIRUS) TEST POSITIVE: ICD-10-CM

## 2021-05-10 DIAGNOSIS — Z32.00 PREGNANCY EXAMINATION OR TEST, PREGNANCY UNCONFIRMED: Primary | ICD-10-CM

## 2021-05-10 LAB — HCG UR QL: NEGATIVE

## 2021-05-10 PROCEDURE — 88305 TISSUE EXAM BY PATHOLOGIST: CPT | Performed by: PATHOLOGY

## 2021-05-10 PROCEDURE — 81025 URINE PREGNANCY TEST: CPT | Performed by: OBSTETRICS & GYNECOLOGY

## 2021-05-10 PROCEDURE — 99212 OFFICE O/P EST SF 10 MIN: CPT | Mod: 25 | Performed by: OBSTETRICS & GYNECOLOGY

## 2021-05-10 PROCEDURE — 57456 ENDOCERV CURETTAGE W/SCOPE: CPT | Performed by: OBSTETRICS & GYNECOLOGY

## 2021-05-10 ASSESSMENT — PATIENT HEALTH QUESTIONNAIRE - PHQ9
SUM OF ALL RESPONSES TO PHQ QUESTIONS 1-9: 0
10. IF YOU CHECKED OFF ANY PROBLEMS, HOW DIFFICULT HAVE THESE PROBLEMS MADE IT FOR YOU TO DO YOUR WORK, TAKE CARE OF THINGS AT HOME, OR GET ALONG WITH OTHER PEOPLE: NOT DIFFICULT AT ALL
SUM OF ALL RESPONSES TO PHQ QUESTIONS 1-9: 0

## 2021-05-10 NOTE — PROGRESS NOTES
Gloria presents for colposcopy. Pap showed: ASCUS HR HPV+.     PMH/PSH/Meds/Allergies reviewed & documented in [facility].    I discussed with the patient the results of her pap smear and/or HPV studies.    I discussed our current understanding of abnormal cytology and the role hpv can play in pre-cancerous cervical change.  I explained the current cytological/histologic terminology.      We discussed the screening nature of pap smears and the need for a more definitive examination.    She is given the opportunity to ask questions and have them answered.  She does agree to proceed with colposcopy.    Procedure for colposcopy and biopsy has been explained to the   patient and consent obtained.    PROCEDURE:  Speculum placed in vagina and excellent visualization of cervix achieved, cervix swabbed  with acetic acid solution.    FINDINGS:  Cervix: no visible lesions, no mosaicism, no punctation and no abnormal vasculature; SCJ visualized 360 degrees without lesions, endocervical curettage performed and specimen labelled and sent to pathology.    Vaginal inspection: vaginal colposcopy not performed.    Vulvar colposcopy: vulvar colposcopy not performed.    Procedure Summary: Patient tolerated procedure well and colposcopy adequate.    Colposcopic Impression: HPV effect    15 minutes spent on the date of the encounter doing discussion regarding HPV and potential fiollow up .  This was in addition to the time required for the procedure.     Plan: Specimens labelled and sent to pathology., Will base further treatment on pathology findings. and treatment options discussed with patient.    Ravi Ashraf MD FACOG      Answers for HPI/ROS submitted by the patient on 5/10/2021   If you checked off any problems, how difficult have these problems made it for you to do your work, take care of things at home, or get along with other people?: Not difficult at all  PHQ9 TOTAL SCORE: 0

## 2021-05-10 NOTE — TELEPHONE ENCOUNTER
Called pt and left a message for pt to call the clinic.    Needs to complete an ACT per Dr. Pugh.    Alix Wilder MA

## 2021-05-11 ASSESSMENT — PATIENT HEALTH QUESTIONNAIRE - PHQ9: SUM OF ALL RESPONSES TO PHQ QUESTIONS 1-9: 0

## 2021-05-12 LAB — COPATH REPORT: NORMAL

## 2021-05-12 NOTE — TELEPHONE ENCOUNTER
Called and spoke with patient and complete.    ACT completed by: telephone, patient had a copy of the ACT form.  Score is 20 and questions regarding IP/ER visits were answered.    Asthma is in control if score is >=20. Chart routed to provider for review if score was less than 20.    Alix Wilder MA

## 2021-05-13 ASSESSMENT — ASTHMA QUESTIONNAIRES: ACT_TOTALSCORE: 20

## 2021-06-21 ENCOUNTER — PATIENT OUTREACH (OUTPATIENT)
Dept: OBGYN | Facility: CLINIC | Age: 31
End: 2021-06-21

## 2021-07-21 ENCOUNTER — OFFICE VISIT (OUTPATIENT)
Dept: URGENT CARE | Facility: URGENT CARE | Age: 31
End: 2021-07-21
Payer: COMMERCIAL

## 2021-07-21 VITALS
DIASTOLIC BLOOD PRESSURE: 72 MMHG | WEIGHT: 185.2 LBS | SYSTOLIC BLOOD PRESSURE: 110 MMHG | TEMPERATURE: 97.9 F | OXYGEN SATURATION: 98 % | HEART RATE: 63 BPM | BODY MASS INDEX: 33.33 KG/M2

## 2021-07-21 DIAGNOSIS — B37.2 CANDIDAL INTERTRIGO: ICD-10-CM

## 2021-07-21 DIAGNOSIS — L73.2 HIDRADENITIS AXILLARIS: Primary | ICD-10-CM

## 2021-07-21 PROCEDURE — 99214 OFFICE O/P EST MOD 30 MIN: CPT | Performed by: FAMILY MEDICINE

## 2021-07-21 RX ORDER — NYSTATIN 100000 U/G
CREAM TOPICAL 2 TIMES DAILY
Qty: 30 G | Refills: 0 | Status: SHIPPED | OUTPATIENT
Start: 2021-07-21 | End: 2021-08-04

## 2021-07-21 RX ORDER — NYSTATIN 100000 [USP'U]/G
POWDER TOPICAL 2 TIMES DAILY
Qty: 60 G | Refills: 0 | Status: SHIPPED | OUTPATIENT
Start: 2021-07-21 | End: 2022-09-30

## 2021-07-21 RX ORDER — FLUCONAZOLE 150 MG/1
150 TABLET ORAL ONCE
Qty: 3 TABLET | Refills: 0 | Status: SHIPPED | OUTPATIENT
Start: 2021-07-21 | End: 2021-07-21

## 2021-07-21 NOTE — PROGRESS NOTES
Chief complaint: rash under right arm    Per patient she has a history of recurrent axillary rash - usually yeast  She says she usually gets a yeast pill and a cream sometimes    She states she also has a history of recurrent bumps in her axillary area although not flared up currently          Description:   Location: bothering her past week  Right axillary   Character or description: red rash  Itching (Pruritis): YES    Progression of Symptoms:  worsening    Accompanying Signs & Symptoms:  Fever: no   Body aches or joint pain: no   Sore throat symptoms: no   Recent cold symptoms: no    History:   Previous similar rash: YES    Precipitating factors:   Exposure to similar rash: no   New exposures: None   Recent travel: no     Alleviating factors:  none     Therapies Tried and outcome: none      Problem list, Medication list, Allergies, and Medical/Social/Surgical histories reviewed in EPIC and updated as appropriate.    ROS:  Constitutional, HEENT, cardiovascular, pulmonary, gi and gu systems are negative, except as otherwise noted.    OBJECTIVE:                                                    /72   Pulse 63   Temp 97.9  F (36.6  C) (Tympanic)   Wt 84 kg (185 lb 3.2 oz)   LMP 07/15/2021   SpO2 98%   BMI 33.33 kg/m    Body mass index is 33.33 kg/m .  GENERAL: healthy, alert and no distress  Neurologic: Cranial nerves intact no gross neurological deficits  Psych: appropriate mood and affect  MS: no gross musculoskeletal defects noted, no edema  Skin: erythematous itchy plaque right axillary area with satellite lesions  Scarring from recurrent hidradenitis seen as well      Diagnostic Test Results:  none      ASSESSMENT/PLAN:                                                        ICD-10-CM    1. Hidradenitis axillaris  L73.2 nystatin (MYCOSTATIN) 271035 UNIT/GM external cream     nystatin (MYCOSTATIN) 130754 UNIT/GM external powder     fluconazole (DIFLUCAN) 150 MG tablet     Adult Dermatology Referral    2. Candidal intertrigo  B37.2 nystatin (MYCOSTATIN) 038836 UNIT/GM external cream     nystatin (MYCOSTATIN) 747927 UNIT/GM external powder     fluconazole (DIFLUCAN) 150 MG tablet     Adult Dermatology Referral       Recurrent - prescribed with fluconazole and nystatin cream  Prescribed with nystatin powder preventative for summer   Referred to dermatology for follow up   Recommend follow up in clinic or dermatology if no relief, sooner if worse  Adverse reactions of medications discussed.  Over the counter medications discussed.   Aware to come back in if with worsening symptoms or if no relief despite treatment plan  Patient voiced understanding and had no further questions.     MD Karla Osorio MD  [facility] [place]

## 2021-07-23 ENCOUNTER — OFFICE VISIT (OUTPATIENT)
Dept: URGENT CARE | Facility: URGENT CARE | Age: 31
End: 2021-07-23
Payer: COMMERCIAL

## 2021-07-23 VITALS
OXYGEN SATURATION: 99 % | HEART RATE: 60 BPM | TEMPERATURE: 97.9 F | DIASTOLIC BLOOD PRESSURE: 70 MMHG | SYSTOLIC BLOOD PRESSURE: 109 MMHG

## 2021-07-23 DIAGNOSIS — R21 RASH: Primary | ICD-10-CM

## 2021-07-23 DIAGNOSIS — E66.01 MORBID OBESITY (H): ICD-10-CM

## 2021-07-23 PROCEDURE — 99213 OFFICE O/P EST LOW 20 MIN: CPT | Performed by: PHYSICIAN ASSISTANT

## 2021-07-23 RX ORDER — FLUCONAZOLE 150 MG/1
TABLET ORAL
COMMUNITY
Start: 2021-07-21 | End: 2022-09-30

## 2021-07-23 RX ORDER — TRIAMCINOLONE ACETONIDE 1 MG/G
CREAM TOPICAL 2 TIMES DAILY
Qty: 15 G | Refills: 0 | Status: SHIPPED | OUTPATIENT
Start: 2021-07-23 | End: 2022-09-30
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
## 2021-07-29 ENCOUNTER — OFFICE VISIT (OUTPATIENT)
Dept: DERMATOLOGY | Facility: CLINIC | Age: 31
End: 2021-07-29
Payer: COMMERCIAL

## 2021-07-29 VITALS — OXYGEN SATURATION: 98 % | HEART RATE: 81 BPM

## 2021-07-29 DIAGNOSIS — L73.9 FOLLICULITIS: Primary | ICD-10-CM

## 2021-07-29 DIAGNOSIS — L30.9 DERMATITIS: ICD-10-CM

## 2021-07-29 PROCEDURE — 99204 OFFICE O/P NEW MOD 45 MIN: CPT | Performed by: PHYSICIAN ASSISTANT

## 2021-07-29 RX ORDER — TRIAMCINOLONE ACETONIDE 1 MG/G
CREAM TOPICAL
Qty: 80 G | Refills: 2 | Status: SHIPPED | OUTPATIENT
Start: 2021-07-29 | End: 2022-09-30

## 2021-07-29 RX ORDER — CLINDAMYCIN PHOSPHATE 10 MG/G
GEL TOPICAL
Qty: 30 G | Refills: 5 | Status: SHIPPED | OUTPATIENT
Start: 2021-07-29 | End: 2022-09-30

## 2021-07-29 NOTE — LETTER
7/29/2021         RE: Gloria Sun  [id number] [id number]        Dear Colleague,    Thank you for referring your patient, Gloria Sun, to the [facility]. Please see a copy of my visit note below.    HPI:   Chief complaints: Gloria Sun is a pleasant 30 year old female who presents for evaluation of a rash on the right armpit. The rash has been present for 1-2 weeks and it itchy and irritated. She has had similar eruptions in the past. Initially she tried diflucan and antifungals but these did not help. She was then given TAC cream which has been helping but she has new smaller pimple like bumps around the armpit, in the groin and on the face       PHYSICAL EXAM:    Pulse 81   LMP 07/15/2021   SpO2 98%   Skin exam performed as follows: Type 2 skin. Mood appropriate  Alert and Oriented X 3. Well developed, well nourished in no distress.  General appearance: Normal  Head including face: Normal  Eyes: conjunctiva and lids: Normal  Mouth: Lips, teeth, gums: Normal  Neck: Normal  Chest-breast/axillae: Normal  Back: Normal  Spleen and liver: Normal  Cardiovascular: Exam of peripheral vascular system by observation for swelling, varicosities, edema: Normal  Genitalia: groin, buttocks: Normal  Extremities: digits/nails (clubbing): Normal  Eccrine and Apocrine glands: Normal  Right upper extremity: Normal  Left upper extremity: Normal  Right lower extremity: Normal  Left lower extremity: Normal  Skin: Scalp and body hair: See below    1. Dermatitis in the right axilla  2. Perifollicular pustules in the axilla, groin, chin    ASSESSMENT/PLAN:     1. Dermatitis - discussed differential and biopsy if struggling in the future. Continue TAC BID x 1-2 more weeks then stop  2. Folliculitis   --Start clindamycin gel BID until areas are resolved.           Follow-up: PRN  CC:   Scribed By: Romana Hill, MS, PA-C          Again, thank you for allowing me to participate in the  care of your patient.        Sincerely,        Romana Short PA-C

## 2021-07-29 NOTE — NURSING NOTE
"Initial Pulse 81   LMP 07/15/2021   SpO2 98%  Estimated body mass index is 33.33 kg/m  as calculated from the following:    Height as of 4/21/21: 1.588 m (5' 2.5\").    Weight as of 7/21/21: 84 kg (185 lb 3.2 oz). .      "

## 2021-07-29 NOTE — PROGRESS NOTES
HPI:   Chief complaints: Gloria Sun is a pleasant 30 year old female who presents for evaluation of a rash on the right armpit. The rash has been present for 1-2 weeks and it itchy and irritated. She has had similar eruptions in the past. Initially she tried diflucan and antifungals but these did not help. She was then given TAC cream which has been helping but she has new smaller pimple like bumps around the armpit, in the groin and on the face       PHYSICAL EXAM:    Pulse 81   LMP 07/15/2021   SpO2 98%   Skin exam performed as follows: Type 2 skin. Mood appropriate  Alert and Oriented X 3. Well developed, well nourished in no distress.  General appearance: Normal  Head including face: Normal  Eyes: conjunctiva and lids: Normal  Mouth: Lips, teeth, gums: Normal  Neck: Normal  Chest-breast/axillae: Normal  Back: Normal  Spleen and liver: Normal  Cardiovascular: Exam of peripheral vascular system by observation for swelling, varicosities, edema: Normal  Genitalia: groin, buttocks: Normal  Extremities: digits/nails (clubbing): Normal  Eccrine and Apocrine glands: Normal  Right upper extremity: Normal  Left upper extremity: Normal  Right lower extremity: Normal  Left lower extremity: Normal  Skin: Scalp and body hair: See below    1. Dermatitis in the right axilla  2. Perifollicular pustules in the axilla, groin, chin    ASSESSMENT/PLAN:     1. Dermatitis - discussed differential and biopsy if struggling in the future. Continue TAC BID x 1-2 more weeks then stop  2. Folliculitis   --Start clindamycin gel BID until areas are resolved.           Follow-up: PRN  CC:   Scribed By: Romana Hill, MS, PA-C

## 2021-08-02 ENCOUNTER — ALLIED HEALTH/NURSE VISIT (OUTPATIENT)
Dept: NURSING | Facility: CLINIC | Age: 31
End: 2021-08-02
Payer: COMMERCIAL

## 2021-08-02 DIAGNOSIS — Z30.9 CONTRACEPTIVE MANAGEMENT: Primary | ICD-10-CM

## 2021-08-02 LAB — HCG UR QL: NEGATIVE

## 2021-08-02 PROCEDURE — 81025 URINE PREGNANCY TEST: CPT

## 2021-08-02 PROCEDURE — 96372 THER/PROPH/DIAG INJ SC/IM: CPT | Performed by: FAMILY MEDICINE

## 2021-08-02 RX ADMIN — MEDROXYPROGESTERONE ACETATE 150 MG: 150 INJECTION, SUSPENSION INTRAMUSCULAR at 16:30

## 2021-08-02 NOTE — PROGRESS NOTES
Clinic Administered Medication Documentation          Depo Provera Documentation    URINE HCG: negative    Depo-Provera Standing Order inclusion/exclusion criteria reviewed.   Patient meets: inclusion criteria     BP: Data Unavailable  LAST PAP/EXAM:   Lab Results   Component Value Date    PAP ASC-US 04/21/2021       Prior to injection, verified patient identity using patient's name and date of birth. Medication was administered. Please see MAR and medication order for additional information.     Was entire vial of medication used? Yes  Vial/Syringe: Single dose vial  Expiration Date:  09/2022  Given if left deltoid    Patient instructed to remain in clinic for 15 minutes and report any adverse reaction to staff immediately .  NEXT INJECTION DUE: 10/18/21 - 11/1/21

## 2021-09-26 ENCOUNTER — HEALTH MAINTENANCE LETTER (OUTPATIENT)
Age: 31
End: 2021-09-26

## 2021-10-26 ENCOUNTER — ALLIED HEALTH/NURSE VISIT (OUTPATIENT)
Dept: FAMILY MEDICINE | Facility: CLINIC | Age: 31
End: 2021-10-26
Payer: COMMERCIAL

## 2021-10-26 DIAGNOSIS — Z30.9 CONTRACEPTIVE MANAGEMENT: Primary | ICD-10-CM

## 2021-10-26 PROCEDURE — 99207 PR NO CHARGE NURSE ONLY: CPT

## 2021-10-26 PROCEDURE — 96372 THER/PROPH/DIAG INJ SC/IM: CPT | Performed by: FAMILY MEDICINE

## 2021-10-26 RX ADMIN — MEDROXYPROGESTERONE ACETATE 150 MG: 150 INJECTION, SUSPENSION INTRAMUSCULAR at 10:02

## 2021-10-26 NOTE — PROGRESS NOTES
BP: Data Unavailable    LAST PAP/EXAM:   Lab Results   Component Value Date    PAP ASC-US 04/21/2021     URINE HCG:not indicated    The following medication was given:     MEDICATION: Depo Provera 150mg  ROUTE: IM  SITE: Deltoid - Right  : [employer].  LOT #: [id number]  EXP:04/30/2023  NEXT INJECTION DUE: 1/11/22 - 1/25/22   Provider: Keaton Bañuelos MD

## 2022-01-12 ENCOUNTER — ALLIED HEALTH/NURSE VISIT (OUTPATIENT)
Dept: FAMILY MEDICINE | Facility: CLINIC | Age: 32
End: 2022-01-12
Payer: COMMERCIAL

## 2022-01-12 VITALS
SYSTOLIC BLOOD PRESSURE: 115 MMHG | HEART RATE: 63 BPM | BODY MASS INDEX: 33.84 KG/M2 | DIASTOLIC BLOOD PRESSURE: 62 MMHG | WEIGHT: 188 LBS

## 2022-01-12 DIAGNOSIS — Z30.42 ENCOUNTER FOR SURVEILLANCE OF INJECTABLE CONTRACEPTIVE: Primary | ICD-10-CM

## 2022-01-12 PROCEDURE — 99207 PR NO CHARGE NURSE ONLY: CPT

## 2022-01-12 PROCEDURE — 96372 THER/PROPH/DIAG INJ SC/IM: CPT | Performed by: NURSE PRACTITIONER

## 2022-01-12 RX ORDER — MEDROXYPROGESTERONE ACETATE 150 MG/ML
150 INJECTION, SUSPENSION INTRAMUSCULAR
Status: CANCELLED | OUTPATIENT
Start: 2022-01-12

## 2022-01-12 RX ADMIN — MEDROXYPROGESTERONE ACETATE 150 MG: 150 INJECTION, SUSPENSION INTRAMUSCULAR at 09:35

## 2022-01-12 NOTE — PROGRESS NOTES
Clinic Administered Medication Documentation    Administrations This Visit     medroxyPROGESTERone (DEPO-PROVERA) injection 150 mg     Admin Date  01/12/2022 Action  Given Dose  150 mg Route  Intramuscular Site  Right Deltoid Administered By  Taylor Sladaña CMA    Ordering Provider: Arianna Pierre APRN CNP    NDC: 36115-893-51    Lot#: [id number]    : [facility]    Patient Supplied?: No    Comments: in window                  Depo Provera Documentation    URINE HCG: not indicated    Depo-Provera Standing Order inclusion/exclusion criteria reviewed.   Patient meets: inclusion criteria     BP: 115/62  LAST PAP/EXAM:   Lab Results   Component Value Date    PAP ASC-US 04/21/2021       Prior to injection, verified patient identity using patient's name and date of birth. Medication was administered. Please see MAR and medication order for additional information.     Was entire vial of medication used? Yes  Vial/Syringe: Single dose vial  Expiration Date:  8/23    Patient instructed to remain in clinic for 15 minutes.  NEXT INJECTION DUE: 3/30/22 - 4/13/22

## 2022-04-08 ENCOUNTER — ALLIED HEALTH/NURSE VISIT (OUTPATIENT)
Dept: FAMILY MEDICINE | Facility: CLINIC | Age: 32
End: 2022-04-08
Payer: COMMERCIAL

## 2022-04-08 VITALS
BODY MASS INDEX: 34.74 KG/M2 | SYSTOLIC BLOOD PRESSURE: 110 MMHG | WEIGHT: 193.03 LBS | DIASTOLIC BLOOD PRESSURE: 68 MMHG

## 2022-04-08 DIAGNOSIS — Z30.42 ENCOUNTER FOR SURVEILLANCE OF INJECTABLE CONTRACEPTIVE: Primary | ICD-10-CM

## 2022-04-08 PROCEDURE — 96372 THER/PROPH/DIAG INJ SC/IM: CPT | Performed by: FAMILY MEDICINE

## 2022-04-08 RX ORDER — MEDROXYPROGESTERONE ACETATE 150 MG/ML
150 INJECTION, SUSPENSION INTRAMUSCULAR
Status: ACTIVE | OUTPATIENT
Start: 2022-04-08 | End: 2023-04-03

## 2022-04-08 RX ADMIN — MEDROXYPROGESTERONE ACETATE 150 MG: 150 INJECTION, SUSPENSION INTRAMUSCULAR at 09:42

## 2022-04-08 NOTE — PROGRESS NOTES
BP: Data Unavailable    LAST PAP/EXAM:   Lab Results   Component Value Date    PAP ASC-US 04/21/2021     URINE HCG:not indicated    The following medication was given:     MEDICATION: Depo Provera 150mg  ROUTE: IM  SITE: Deltoid - Left  : [employer]  LOT #: [id number]  EXP:09/01/23  NEXT INJECTION DUE: 6/24/22 - 7/8/22   Provider: Dr. Pugh    Clinic Administered Medication Documentation          Depo Provera Documentation    URINE HCG: not indicated    Depo-Provera Standing Order inclusion/exclusion criteria reviewed.   Patient meets: inclusion criteria     BP: Data Unavailable  LAST PAP/EXAM:   Lab Results   Component Value Date    PAP ASC-US 04/21/2021       Prior to injection, verified patient identity using patient's name and date of birth. Medication was administered. Please see MAR and medication order for additional information.     Was entire vial of medication used? Yes  Vial/Syringe: Single dose vial  Expiration Date:  09/01/23    Patient instructed to remain in clinic for 15 minutes, report any adverse reaction to staff immediately  and stay in clinic after the injection but patient declined.  NEXT INJECTION DUE: 6/24/22 - 7/8/22

## 2022-04-19 ENCOUNTER — PATIENT OUTREACH (OUTPATIENT)
Dept: OBGYN | Facility: CLINIC | Age: 32
End: 2022-04-19
Payer: COMMERCIAL

## 2022-04-19 DIAGNOSIS — R87.810 CERVICAL HIGH RISK HPV (HUMAN PAPILLOMAVIRUS) TEST POSITIVE: ICD-10-CM

## 2022-04-19 NOTE — LETTER
April 19, 2022      Gloria Sun  [address] [facility] [address]        Dear ,    This letter is to remind you that you are due for your follow-up Pap smear and Human Papillomavirus (HPV) test.    Please call [phone] to schedule your appointment at your earliest convenience.    If you have completed the appointment outside of the [facility] system, please have the records forwarded to our office. We will update your chart for your provider to review before your next annual wellness visit.     Thank you for choosing [facility]!      Sincerely,    Your [facility] Care Team

## 2022-06-17 NOTE — TELEPHONE ENCOUNTER
FYI to provider - Patient is lost to pap tracking follow-up. Attempts to contact pt have been made per reminder process and there has been no reply and/or no appt scheduled. Contact hx listed below.     12/9/16 ASCUS Pap, + HR HPV (Neg 16/18). Pt pregnant. Plan colp  1/16/17 Hemet visually normal; no biopsies taken. Plan cotest at 6 wk postpartum visit.   8/3/17 NIL pap, + HR HPV (not 16 or 18). Plan: cotest in 1 year  8/15/18 NIL pap, Neg HPV. Plan cotest in 1 year.   10/17/19 NIL Pap, + HR HPV (not 16 or 18).  Plan cotest in 2 years per provider.   4/21/21 ASCUS Pap, + HR HPV (neg 16/18). Hemet due by 7/21/21  5/10/21 Colpo ECC Negative for dysplasia. Plan cotest in 1 year   5/13/21 Provider notified pt via [employer]. Pt read results.   4/19/22 Reminder letter  5/18/22 Reminder call -- left message  6/17/22 Lost to follow-up for pap tracking     Gail Rodríguez RN BSN, Pap Tracking

## 2022-07-03 ENCOUNTER — HEALTH MAINTENANCE LETTER (OUTPATIENT)
Age: 32
End: 2022-07-03

## 2022-08-22 ENCOUNTER — E-VISIT (OUTPATIENT)
Dept: URGENT CARE | Facility: CLINIC | Age: 32
End: 2022-08-22
Payer: COMMERCIAL

## 2022-08-22 DIAGNOSIS — B96.89 ACUTE BACTERIAL SINUSITIS: Primary | ICD-10-CM

## 2022-08-22 DIAGNOSIS — J01.90 ACUTE BACTERIAL SINUSITIS: Primary | ICD-10-CM

## 2022-08-22 PROCEDURE — 99421 OL DIG E/M SVC 5-10 MIN: CPT | Performed by: FAMILY MEDICINE

## 2022-08-22 NOTE — PATIENT INSTRUCTIONS
Dear Gloria Sun    After reviewing your responses, I've been able to diagnose you with?a sinus infection caused by bacteria.?     Based on your responses and diagnosis, I have prescribed AUgmentin to treat your symptoms. I have sent this to your pharmacy.?     It is also important to stay well hydrated, get lots of rest and take over-the-counter decongestants,?tylenol?or ibuprofen if you?are able to?take those medications per your primary care provider to help relieve discomfort.?     It is important that you take?all of?your prescribed medication even if your symptoms are improving after a few doses.? Taking?all of?your medicine helps prevent the symptoms from returning.?     If your symptoms worsen, you develop severe headache, vomiting, high fever (>102), or are not improving in 7 days, please contact your primary care provider for an appointment or visit any of our convenient Walk-in Care or Urgent Care Centers to be seen which can be found on our website?here.?     Thanks again for choosing?us?as your health care partner,?   ?  Domi Mendoza MD?

## 2022-08-23 ENCOUNTER — NURSE TRIAGE (OUTPATIENT)
Dept: NURSING | Facility: CLINIC | Age: 32
End: 2022-08-23

## 2022-08-23 NOTE — TELEPHONE ENCOUNTER
"Gloria reports new onset of numbness in her mouth, from the bottom of her nose downward. She describes it like getting her mouth numbed at the dentist.    She is concerned about a possible allergic reaction to the Augmentin that she started yesterday for a \"severe\" sinus infection  - Numbness on both sides of mouth  - Denies swelling to tongue, lips, throat  - Augmentin - 1 yesterday ~5 pm; 1 this morning    She also reports  - Maxillary sinus pressure, bilateral  - Head congestion; Pressure in head - generalized    Home Care advised  Care Advice reviewed    Marta Rueda RN  [facility]      Reason for Disposition    [1] Numbness or tingling on both sides of body AND [2] is a new symptom present < 24 hours    Additional Information    Negative: [1] SEVERE weakness (i.e., unable to walk or barely able to walk, requires support) AND [2] new-onset or worsening    Negative: [1] Weakness (i.e., paralysis, loss of muscle strength) of the face, arm / hand, or leg / foot on one side of the body AND [2] sudden onset AND [3] present now (Exception: Bell's palsy suspected [i.e., weakness only on one side of the face, developing over hours to days, no other symptoms])    Negative: [1] Numbness (i.e., loss of sensation) of the face, arm / hand, or leg / foot on one side of the body AND [2] sudden onset AND [3] present now    Negative: [1] Loss of speech or garbled speech AND [2] sudden onset AND [3] present now    Negative: Difficult to awaken or acting confused (e.g., disoriented, slurred speech)    Negative: Sounds like a life-threatening emergency to the triager    Negative: Headache  (and neurologic deficit)    Negative: [1] Back pain AND [2] numbness (loss of sensation) in groin or rectal area    Negative: [1] Unable to urinate (or only a few drops) > 4 hours AND [2] bladder feels very full (e.g., palpable bladder or strong urge to urinate)    Negative: [1] Loss of bladder or bowel control (urine " or bowel incontinence; wetting self, leaking stool) AND [2] new-onset    Negative: [1] Weakness (i.e., paralysis, loss of muscle strength) of the face, arm / hand, or leg / foot on one side of the body AND [2] sudden onset AND [3] brief (now gone)    Negative: [1] Numbness (i.e., loss of sensation) of the face, arm / hand, or leg / foot on one side of the body AND [2] sudden onset AND [3] brief (now gone)    Negative: [1] Loss of speech or garbled speech AND [2] sudden onset AND [3] brief (now gone)    Negative: Bell's palsy suspected (i.e., weakness on only one side of the face, developing over hours to days, no other symptoms)    Negative: Patient sounds very sick or weak to the triager    Negative: Neck pain (and neurologic deficit)    Negative: Back pain (and neurologic deficit)    Negative: [1] Weakness of the face, arm / hand, or leg / foot on one side of the body AND [2] gradual onset (e.g., days to weeks) AND [3] present now    Negative: [1] Numbness (i.e., loss of sensation) of the face, arm / hand, or leg / foot on one side of the body AND [2] gradual onset (e.g., days to weeks) AND [3] present now    Negative: [1] Loss of speech or garbled speech AND [2] gradual onset (e.g., days to weeks) AND [3] present now    Negative: [1] Tingling (e.g., pins and needles) of the face, arm / hand, or leg / foot on one side of the body AND [2] present now (Exceptions: chronic/recurrent symptom lasting > 4 weeks or tingling from known cause, such as: bumped elbow, carpal tunnel syndrome, pinched nerve, frostbite)    Negative: [1] Loss of speech or garbled speech AND [2] is a chronic symptom (recurrent or ongoing AND present > 4 weeks)    Negative: [1] Weakness of arm / hand, or leg / foot AND [2] is a chronic symptom (recurrent or ongoing AND present > 4 weeks)    Negative: [1] Numbness or tingling in one or both hands AND [2] is a chronic symptom (recurrent or ongoing AND present > 4 weeks)    Negative: [1] Numbness or  tingling in one or both feet AND [2] is a chronic symptom (recurrent or ongoing AND present > 4 weeks)    Negative: [1] Numbness or tingling on both sides of body AND [2] is a new symptom present > 24 hours    Negative: [1] Tingling in hand (e.g., pins and needles) AND [2] after prolonged lying on arm AND [3]  brief (now gone)    Negative: [1] Bumped elbow AND [2] brief (now gone) numbness (or tingling or burning) in hand and fingers    Negative: [1] Tingling in foot (e.g., pins and needles) AND [2] after prolonged sitting AND [3] brief (now gone)    Protocols used: NEUROLOGIC DEFICIT-A-AH

## 2022-09-30 ENCOUNTER — OFFICE VISIT (OUTPATIENT)
Dept: OBGYN | Facility: CLINIC | Age: 32
End: 2022-09-30
Payer: COMMERCIAL

## 2022-09-30 VITALS
SYSTOLIC BLOOD PRESSURE: 110 MMHG | BODY MASS INDEX: 35.51 KG/M2 | WEIGHT: 193 LBS | HEIGHT: 62 IN | DIASTOLIC BLOOD PRESSURE: 72 MMHG | HEART RATE: 92 BPM

## 2022-09-30 DIAGNOSIS — Z11.3 SCREEN FOR STD (SEXUALLY TRANSMITTED DISEASE): Primary | ICD-10-CM

## 2022-09-30 DIAGNOSIS — Z30.42 SURVEILLANCE FOR DEPO-PROVERA CONTRACEPTION: ICD-10-CM

## 2022-09-30 DIAGNOSIS — R87.810 CERVICAL HIGH RISK HPV (HUMAN PAPILLOMAVIRUS) TEST POSITIVE: ICD-10-CM

## 2022-09-30 PROCEDURE — 87624 HPV HI-RISK TYP POOLED RSLT: CPT | Performed by: OBSTETRICS & GYNECOLOGY

## 2022-09-30 PROCEDURE — 99214 OFFICE O/P EST MOD 30 MIN: CPT | Mod: 25 | Performed by: OBSTETRICS & GYNECOLOGY

## 2022-09-30 PROCEDURE — 87491 CHLMYD TRACH DNA AMP PROBE: CPT | Performed by: OBSTETRICS & GYNECOLOGY

## 2022-09-30 PROCEDURE — 87389 HIV-1 AG W/HIV-1&-2 AB AG IA: CPT | Performed by: OBSTETRICS & GYNECOLOGY

## 2022-09-30 PROCEDURE — 86780 TREPONEMA PALLIDUM: CPT | Performed by: OBSTETRICS & GYNECOLOGY

## 2022-09-30 PROCEDURE — 87340 HEPATITIS B SURFACE AG IA: CPT | Performed by: OBSTETRICS & GYNECOLOGY

## 2022-09-30 PROCEDURE — 96372 THER/PROPH/DIAG INJ SC/IM: CPT | Performed by: OBSTETRICS & GYNECOLOGY

## 2022-09-30 PROCEDURE — 88141 CYTOPATH C/V INTERPRET: CPT

## 2022-09-30 PROCEDURE — 36415 COLL VENOUS BLD VENIPUNCTURE: CPT | Performed by: OBSTETRICS & GYNECOLOGY

## 2022-09-30 PROCEDURE — 86803 HEPATITIS C AB TEST: CPT | Performed by: OBSTETRICS & GYNECOLOGY

## 2022-09-30 PROCEDURE — 88175 CYTOPATH C/V AUTO FLUID REDO: CPT | Performed by: OBSTETRICS & GYNECOLOGY

## 2022-09-30 PROCEDURE — 87591 N.GONORRHOEAE DNA AMP PROB: CPT | Performed by: OBSTETRICS & GYNECOLOGY

## 2022-09-30 RX ORDER — MEDROXYPROGESTERONE ACETATE 150 MG/ML
150 INJECTION, SUSPENSION INTRAMUSCULAR
Status: DISCONTINUED | OUTPATIENT
Start: 2022-09-30 | End: 2023-09-19

## 2022-09-30 RX ADMIN — MEDROXYPROGESTERONE ACETATE 150 MG: 150 INJECTION, SUSPENSION INTRAMUSCULAR at 10:49

## 2022-09-30 NOTE — NURSING NOTE
"Chief Complaint   Patient presents with     Contraception     Patient would like to restart Depo. Previously on it for painful, heavy periods.        Initial /72   Pulse 92   Ht 1.575 m (5' 2\")   Wt 87.5 kg (193 lb)   LMP 2022   Breastfeeding No   BMI 35.30 kg/m   Estimated body mass index is 35.3 kg/m  as calculated from the following:    Height as of this encounter: 1.575 m (5' 2\").    Weight as of this encounter: 87.5 kg (193 lb).  BP completed using cuff size: regular    Questioned patient about current smoking habits.  Pt. has never smoked.          The following HM Due: NONE      The following patient reported/Care Every where data was sent to:  P ABSTRACT QUALITY INITIATIVES [[id number]]  Tere Hong LPN             "

## 2022-09-30 NOTE — PROGRESS NOTES
SUBJECTIVE:                                                   Gloria Sun is a 31 year old female who presents to clinic today to follow up for contraception. Successfully using depo provera, overdue for shot and was told she needed appointment. LMP one week ago. Would like to get this today.    Interested in STD screening.    History HPV positive on pap--lost to follow up. Due to pap and HPV today, would like to complete this.      Problem list and histories reviewed & adjusted, as indicated.  Additional history: as documented.    Patient Active Problem List   Diagnosis     CARDIOVASCULAR SCREENING; LDL GOAL LESS THAN 160     Chronic jaw pain     Status post gastric banding     Intermittent asthma, uncomplicated     Health Care Home     Cervical high risk HPV (human papillomavirus) test positive     Morbid obesity (H)     Major depressive disorder, recurrent episode, mild (H)     Migraines     Past Surgical History:   Procedure Laterality Date     ESOPHAGOSCOPY, GASTROSCOPY, DUODENOSCOPY (EGD), COMBINED Left 12/31/2014    Procedure: COMBINED ESOPHAGOSCOPY, GASTROSCOPY, DUODENOSCOPY (EGD), BIOPSY SINGLE OR MULTIPLE;  Surgeon: Ilan Marrero MD;  Location:  GI     LAPAROSCOPIC CHOLECYSTECTOMY  02/21/2014    Procedure: LAPAROSCOPIC CHOLECYSTECTOMY;  Laparoscopic Cholecystectomy;  Surgeon: Ilan Marrero MD;  Location:  OR     LAPAROSCOPIC GASTRIC SLEEVE  01/21/2013    Procedure: LAPAROSCOPIC GASTRIC SLEEVE;  Laparoscopic Sleeve Gastrectomy;  Surgeon: Chato Mathews MD;  Location:  OR     MOUTH SURGERY  01/01/2009     ZZC GASTRIC BYPASS,OBESE<100CM RHIANNA-EN-Y  2020    [place]      Social History     Tobacco Use     Smoking status: Never Smoker     Smokeless tobacco: Never Used   Substance Use Topics     Alcohol use: No     Alcohol/week: 0.0 standard drinks      Problem (# of Occurrences) Relation (Name,Age of Onset)    Alzheimer Disease (1) Maternal Grandfather    Anxiety Disorder (1) Mother  "   Arthritis (1) Paternal Grandmother: d. lupus    Asthma (1) Maternal Grandfather    C.A.D. (2) Maternal Grandmother: CABG, Maternal Grandfather: CABG, MI     Cancer (2) Father: skin, Maternal Grandfather    Cerebrovascular Disease (1) Paternal Grandmother    Depression (3) Mother, Maternal Grandmother, Maternal Grandfather    Diabetes (1) Father    Heart Disease (1) Paternal Grandfather    Hypertension (4) Mother, Father, Maternal Grandmother, Maternal Grandfather    Lipids (4) Mother, Maternal Grandmother, Maternal Grandfather, Paternal Grandfather    Obesity (5) Mother, Father, Maternal Grandmother, Maternal Grandfather, Paternal Grandmother    Respiratory (1) Maternal Grandfather       Negative family history of: Thyroid Disease, Glaucoma, Macular Degeneration            No current outpatient medications on file prior to visit.  medroxyPROGESTERone (DEPO-PROVERA) injection 150 mg      Allergies   Allergen Reactions     Codeine Nausea and Vomiting     Desogen [Apri]      Hot flashes     Desogestrel-Ethinyl Estradiol Nausea     Dust Mites      Fluoxetine      Irritable, easy bruising     Magnesium Sulfate Injection      Burning, hotflashes     Medroxyprogesterone Unknown     Swelling at injection site     Morphine      \"Weight loss surgery so it burns the inside of my stomach\"     Venlafaxine      Agitation, anxiety        ROS:  Negative except as noted in HPI.    OBJECTIVE:     /72   Pulse 92   Ht 1.575 m (5' 2\")   Wt 87.5 kg (193 lb)   LMP 09/23/2022   Breastfeeding No   BMI 35.30 kg/m    General: appears well, in no distress.  Pelvis: External genitalia, Bartholin, urethral, and Chestnut glands within normal limits. On spec exam, cervix is normal. Pap and swab for GC/CHLAMYDIA obtained.    In-Clinic Test Results:  No results found for this or any previous visit (from the past 24 hour(s)).    ASSESSMENT/PLAN:                                                        ICD-10-CM    1. Screen for STD " (sexually transmitted disease)  Z11.3 Hepatitis B surface antigen     HIV Antigen Antibody Combo     Hepatitis C antibody     Chlamydia trachomatis/Neisseria gonorrhoeae by PCR     Treponema Abs w Reflex to RPR and Titer   2. Cervical high risk HPV (human papillomavirus) test positive  R87.810    3. Surveillance for Depo-Provera contraception  Z30.42          Depo today.  Will send results when available.  Pap and HPV in one year if both normal; otherwise, follow up for colposcopy per ASCCP guidelines.    Lina Browne MD  [facility]

## 2022-09-30 NOTE — PROGRESS NOTES
BP: 110/72    LAST PAP/EXAM:   Lab Results   Component Value Date    PAP ASC-US 04/21/2021     URINE HCG:not indicated    The following medication was given:     MEDICATION: Depo Provera 150mg  ROUTE: IM  SITE: Deltoid - Right  : [employer]  LOT #: [id number]  EXP:01/30/2024  NEXT INJECTION DUE: 12/16/22 - 12/30/22   Provider: Dr. Lina Hong LPANANYA

## 2022-10-01 LAB
C TRACH DNA SPEC QL PROBE+SIG AMP: NEGATIVE
HBV SURFACE AG SERPL QL IA: NONREACTIVE
HCV AB SERPL QL IA: NONREACTIVE
HIV 1+2 AB+HIV1 P24 AG SERPL QL IA: NONREACTIVE
N GONORRHOEA DNA SPEC QL NAA+PROBE: NEGATIVE
T PALLIDUM AB SER QL: NONREACTIVE

## 2022-10-06 LAB
BKR LAB AP GYN ADEQUACY: ABNORMAL
BKR LAB AP GYN INTERPRETATION: ABNORMAL
BKR LAB AP HPV REFLEX: ABNORMAL
BKR LAB AP LMP: ABNORMAL
BKR LAB AP PREVIOUS ABNL DX: ABNORMAL
BKR LAB AP PREVIOUS ABNORMAL: ABNORMAL
PATH REPORT.COMMENTS IMP SPEC: ABNORMAL
PATH REPORT.COMMENTS IMP SPEC: ABNORMAL
PATH REPORT.RELEVANT HX SPEC: ABNORMAL

## 2022-10-10 ENCOUNTER — PATIENT OUTREACH (OUTPATIENT)
Dept: OBGYN | Facility: CLINIC | Age: 32
End: 2022-10-10

## 2022-10-10 LAB
HUMAN PAPILLOMA VIRUS 16 DNA: NEGATIVE
HUMAN PAPILLOMA VIRUS 18 DNA: NEGATIVE
HUMAN PAPILLOMA VIRUS FINAL DIAGNOSIS: ABNORMAL
HUMAN PAPILLOMA VIRUS OTHER HR: POSITIVE

## 2022-11-21 ENCOUNTER — E-VISIT (OUTPATIENT)
Dept: FAMILY MEDICINE | Facility: CLINIC | Age: 32
End: 2022-11-21
Payer: COMMERCIAL

## 2022-11-21 DIAGNOSIS — N93.8 DUB (DYSFUNCTIONAL UTERINE BLEEDING): Primary | ICD-10-CM

## 2022-11-21 PROCEDURE — 99422 OL DIG E/M SVC 11-20 MIN: CPT | Performed by: FAMILY MEDICINE

## 2022-11-21 RX ORDER — IBUPROFEN 800 MG/1
800 TABLET, FILM COATED ORAL EVERY 8 HOURS PRN
Qty: 30 TABLET | Refills: 0 | Status: SHIPPED | OUTPATIENT
Start: 2022-11-21 | End: 2023-04-14

## 2022-11-21 NOTE — PATIENT INSTRUCTIONS
Thank you for choosing us for your care. I have placed an order for a prescription so that you can start treatment. View your full visit summary for details by clicking on the link below. Your pharmacist will able to address any questions you may have about the medication.     If you're not feeling better within 5-7 days, please schedule an appointment.  You can schedule an appointment right here in [employer], or call [phone]  If the visit is for the same symptoms as your eVisit, we'll refund the cost of your eVisit if seen within seven days.    Thank you for choosing us for your care. Given your symptoms, I would like you to do a lab-only visit to determine what is causing them.  I have placed the orders.  Please schedule an appointment with the lab right here in [employer], or call [phone].  I will let you know when the results are back and next steps to take.  Thank you for choosing us for your care. I think an in-clinic visit would be best next steps based on your symptoms. Please schedule a clinic appointment; you won t be charged for this eVisit.      You can schedule an appointment right here in [employer], or call [phone]

## 2023-04-12 ENCOUNTER — OFFICE VISIT (OUTPATIENT)
Dept: URGENT CARE | Facility: URGENT CARE | Age: 33
End: 2023-04-12
Payer: COMMERCIAL

## 2023-04-12 VITALS
HEART RATE: 58 BPM | OXYGEN SATURATION: 98 % | WEIGHT: 194 LBS | BODY MASS INDEX: 35.48 KG/M2 | SYSTOLIC BLOOD PRESSURE: 108 MMHG | RESPIRATION RATE: 16 BRPM | DIASTOLIC BLOOD PRESSURE: 69 MMHG | TEMPERATURE: 98.3 F

## 2023-04-12 DIAGNOSIS — B96.89 BACTERIAL VAGINOSIS: Primary | ICD-10-CM

## 2023-04-12 DIAGNOSIS — N89.8 VAGINAL DISCHARGE: ICD-10-CM

## 2023-04-12 DIAGNOSIS — Z11.3 SCREEN FOR STD (SEXUALLY TRANSMITTED DISEASE): ICD-10-CM

## 2023-04-12 DIAGNOSIS — N76.0 BACTERIAL VAGINOSIS: Primary | ICD-10-CM

## 2023-04-12 LAB
ALBUMIN UR-MCNC: NEGATIVE MG/DL
APPEARANCE UR: ABNORMAL
BACTERIA #/AREA URNS HPF: ABNORMAL /HPF
BILIRUB UR QL STRIP: NEGATIVE
CLUE CELLS: PRESENT
COLOR UR AUTO: YELLOW
GLUCOSE UR STRIP-MCNC: NEGATIVE MG/DL
HCG UR QL: NEGATIVE
HGB UR QL STRIP: NEGATIVE
KETONES UR STRIP-MCNC: NEGATIVE MG/DL
LEUKOCYTE ESTERASE UR QL STRIP: ABNORMAL
NITRATE UR QL: NEGATIVE
PH UR STRIP: 6 [PH] (ref 5–7)
RBC #/AREA URNS AUTO: ABNORMAL /HPF
SP GR UR STRIP: 1.02 (ref 1–1.03)
SQUAMOUS #/AREA URNS AUTO: ABNORMAL /LPF
TRICHOMONAS, WET PREP: ABNORMAL
UROBILINOGEN UR STRIP-ACNC: 0.2 E.U./DL
WBC #/AREA URNS AUTO: ABNORMAL /HPF
WBC'S/HIGH POWER FIELD, WET PREP: ABNORMAL
YEAST, WET PREP: ABNORMAL

## 2023-04-12 PROCEDURE — 87210 SMEAR WET MOUNT SALINE/INK: CPT | Performed by: FAMILY MEDICINE

## 2023-04-12 PROCEDURE — 81001 URINALYSIS AUTO W/SCOPE: CPT | Performed by: FAMILY MEDICINE

## 2023-04-12 PROCEDURE — 99214 OFFICE O/P EST MOD 30 MIN: CPT | Performed by: FAMILY MEDICINE

## 2023-04-12 PROCEDURE — 87591 N.GONORRHOEAE DNA AMP PROB: CPT | Performed by: FAMILY MEDICINE

## 2023-04-12 PROCEDURE — 83550 IRON BINDING TEST: CPT | Performed by: FAMILY MEDICINE

## 2023-04-12 PROCEDURE — 36415 COLL VENOUS BLD VENIPUNCTURE: CPT | Performed by: FAMILY MEDICINE

## 2023-04-12 PROCEDURE — 81025 URINE PREGNANCY TEST: CPT | Performed by: FAMILY MEDICINE

## 2023-04-12 PROCEDURE — 87491 CHLMYD TRACH DNA AMP PROBE: CPT | Performed by: FAMILY MEDICINE

## 2023-04-12 PROCEDURE — 83540 ASSAY OF IRON: CPT | Performed by: FAMILY MEDICINE

## 2023-04-12 PROCEDURE — 85027 COMPLETE CBC AUTOMATED: CPT | Performed by: FAMILY MEDICINE

## 2023-04-12 RX ORDER — METRONIDAZOLE 500 MG/1
500 TABLET ORAL 2 TIMES DAILY
Qty: 14 TABLET | Refills: 0 | Status: SHIPPED | OUTPATIENT
Start: 2023-04-12 | End: 2023-04-19
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
## 2023-04-13 LAB
C TRACH DNA SPEC QL PROBE+SIG AMP: NEGATIVE
ERYTHROCYTE [DISTWIDTH] IN BLOOD BY AUTOMATED COUNT: 12 % (ref 10–15)
HCT VFR BLD AUTO: 39.9 % (ref 35–47)
HGB BLD-MCNC: 13.2 G/DL (ref 11.7–15.7)
IRON SATN MFR SERPL: 24 % (ref 15–46)
IRON SERPL-MCNC: 84 UG/DL (ref 35–180)
MCH RBC QN AUTO: 30.3 PG (ref 26.5–33)
MCHC RBC AUTO-ENTMCNC: 33.1 G/DL (ref 31.5–36.5)
MCV RBC AUTO: 92 FL (ref 78–100)
N GONORRHOEA DNA SPEC QL NAA+PROBE: NEGATIVE
PLATELET # BLD AUTO: 229 10E3/UL (ref 150–450)
RBC # BLD AUTO: 4.35 10E6/UL (ref 3.8–5.2)
TIBC SERPL-MCNC: 349 UG/DL (ref 240–430)
WBC # BLD AUTO: 9.5 10E3/UL (ref 4–11)

## 2023-04-14 ENCOUNTER — E-VISIT (OUTPATIENT)
Dept: FAMILY MEDICINE | Facility: CLINIC | Age: 33
End: 2023-04-14
Payer: COMMERCIAL

## 2023-04-14 ENCOUNTER — MYC REFILL (OUTPATIENT)
Dept: FAMILY MEDICINE | Facility: CLINIC | Age: 33
End: 2023-04-14

## 2023-04-14 DIAGNOSIS — N93.8 DUB (DYSFUNCTIONAL UTERINE BLEEDING): ICD-10-CM

## 2023-04-14 DIAGNOSIS — G44.219 EPISODIC TENSION-TYPE HEADACHE, NOT INTRACTABLE: Primary | ICD-10-CM

## 2023-04-14 PROCEDURE — 99207 PR NON-BILLABLE SERV PER CHARTING: CPT | Performed by: FAMILY MEDICINE

## 2023-04-14 NOTE — PATIENT INSTRUCTIONS
Thank you for choosing us for your care. I think an in-clinic visit would be best next steps based on your symptoms. Please schedule a clinic appointment; you won t be charged for this eVisit.      You can schedule an appointment right here in [facility], or call [phone]    Thank you for choosing us for your care. Based on the information provided, I believe you need to be seen immediately.  Please go urgent care as soon as possible.     You will not be charged for this eVisit.

## 2023-04-18 RX ORDER — IBUPROFEN 800 MG/1
800 TABLET, FILM COATED ORAL EVERY 8 HOURS PRN
Qty: 30 TABLET | Refills: 0 | Status: SHIPPED | OUTPATIENT
Start: 2023-04-18 | End: 2023-05-26

## 2023-04-23 ENCOUNTER — HEALTH MAINTENANCE LETTER (OUTPATIENT)
Age: 33
End: 2023-04-23

## 2023-05-26 ENCOUNTER — VIRTUAL VISIT (OUTPATIENT)
Dept: FAMILY MEDICINE | Facility: CLINIC | Age: 33
End: 2023-05-26
Payer: COMMERCIAL

## 2023-05-26 DIAGNOSIS — N76.0 BACTERIAL VAGINOSIS: Primary | ICD-10-CM

## 2023-05-26 DIAGNOSIS — R23.8 SKIN IRRITATION: ICD-10-CM

## 2023-05-26 DIAGNOSIS — B96.89 BACTERIAL VAGINOSIS: Primary | ICD-10-CM

## 2023-05-26 PROCEDURE — 99213 OFFICE O/P EST LOW 20 MIN: CPT | Mod: VID | Performed by: INTERNAL MEDICINE

## 2023-05-26 RX ORDER — BENZOCAINE/MENTHOL 6 MG-10 MG
LOZENGE MUCOUS MEMBRANE 2 TIMES DAILY
Qty: 15 G | Refills: 0 | COMMUNITY
Start: 2023-05-26 | End: 2023-09-19

## 2023-05-26 RX ORDER — METRONIDAZOLE 500 MG/1
500 TABLET ORAL 2 TIMES DAILY
Qty: 14 TABLET | Refills: 0 | Status: SHIPPED | OUTPATIENT
Start: 2023-05-26 | End: 2023-06-02

## 2023-05-26 ASSESSMENT — PATIENT HEALTH QUESTIONNAIRE - PHQ9: SUM OF ALL RESPONSES TO PHQ QUESTIONS 1-9: 0

## 2023-05-26 ASSESSMENT — ASTHMA QUESTIONNAIRES: ACT_TOTALSCORE: 25

## 2023-05-26 NOTE — PROGRESS NOTES
Gloria is a 32 year old who is being evaluated via a billable video visit.      How would you like to obtain your AVS? [email or web address]  If the video visit is dropped, the invitation should be resent by: Other e-mail: PermissionTV  Will anyone else be joining your video visit? No    Gloria was seen today for vaginal problem.    Diagnoses and all orders for this visit:    Bacterial vaginosis  On 04/12/2023, she visited  for irritation at her vagina. Wet prep was positive for bacterial vaginosis and she was given Flagyl for 7 days. Patient notes that her symptom has resolved afterward, but she is now developing the same sensation at her vagina. Notably, she recently switched to rough toilet paper and might have irritated the skin since she developed some redness at the left side of labia. Denies vaginal discharge, odor or any other symptoms.   -     metroNIDAZOLE (FLAGYL) 500 MG tablet; Take 1 tablet (500 mg) by mouth 2 times daily for 7 days  Since her symptom is similar to one when she was positive for BV, I will treat empirically with Flagyl x 7 days.   If her symptom does not improve, I advised patient to do in person visit for further evaluation.     Skin irritation  -     hydrocortisone (CORTAID) 1 % external cream; Apply topically 2 times daily  Advised patient to apply OTC hydrocortisone cream externally for irritation.     Other orders  -     REVIEW OF HEALTH MAINTENANCE PROTOCOL ORDERS      Subjective   Gloria is a 32 year old, presenting for the following health issues:  Vaginal Problem         View : No data to display.              HPI     Review of Systems   Constitutional, HEENT, cardiovascular, pulmonary, GI, , musculoskeletal, neuro, skin, endocrine and psych systems are negative, except as otherwise noted.      Objective    Vitals - Patient Reported  Pain Score: No Pain (0)      Vitals:  No vitals were obtained today due to virtual visit.    Physical Exam   GENERAL: Healthy, alert and no distress  EYES:  Eyes grossly normal to inspection.  No discharge or erythema, or obvious scleral/conjunctival abnormalities.  RESP: No audible wheeze, cough, or visible cyanosis.  No visible retractions or increased work of breathing.    SKIN: Visible skin clear. No significant rash, abnormal pigmentation or lesions.  NEURO: Cranial nerves grossly intact.  Mentation and speech appropriate for age.  PSYCH: Mentation appears normal, affect normal/bright, judgement and insight intact, normal speech and appearance well-groomed.    This document serves as a record of the services and decisions personally performed and made by Dr. Enrique. It was created on her behalf by Cristin Simon, a trained medical scribe. The creation of this document is based the provider's statements to the medical scribe.          Video-Visit Details    Type of service:  Video Visit   Video Start Time: 1:32 PM  Video End Time:1:38 PM    Originating Location (pt. Location): Home  Distant Location (provider location):  On-site  Platform used for Video Visit: Amaya

## 2023-05-26 NOTE — PATIENT INSTRUCTIONS
Take metronidazole 500 twice a day for 7 days  Use over-the-counter hydrocortisone cream to irritated area on your labia  If your symptoms does not improve or persist or get worse then you need to make an appointment to see someone in person  You can make that appointment in clinic which is closer to you    Seek sooner medical attention if there is any worsening of symptoms or problems.

## 2023-07-07 ENCOUNTER — OFFICE VISIT (OUTPATIENT)
Dept: URGENT CARE | Facility: URGENT CARE | Age: 33
End: 2023-07-07
Payer: COMMERCIAL

## 2023-07-07 VITALS
RESPIRATION RATE: 14 BRPM | HEART RATE: 66 BPM | SYSTOLIC BLOOD PRESSURE: 120 MMHG | WEIGHT: 189 LBS | TEMPERATURE: 98.4 F | BODY MASS INDEX: 34.57 KG/M2 | OXYGEN SATURATION: 99 % | DIASTOLIC BLOOD PRESSURE: 75 MMHG

## 2023-07-07 DIAGNOSIS — R09.81 NASAL CONGESTION: ICD-10-CM

## 2023-07-07 DIAGNOSIS — J30.9 ALLERGIC RHINITIS, UNSPECIFIED SEASONALITY, UNSPECIFIED TRIGGER: ICD-10-CM

## 2023-07-07 DIAGNOSIS — J01.90 ACUTE SINUSITIS WITH SYMPTOMS > 10 DAYS: Primary | ICD-10-CM

## 2023-07-07 PROCEDURE — 99213 OFFICE O/P EST LOW 20 MIN: CPT | Performed by: NURSE PRACTITIONER

## 2023-07-07 NOTE — PROGRESS NOTES
Assessment & Plan     Acute sinusitis with symptoms > 10 days    - amoxicillin-clavulanate (AUGMENTIN) 875-125 MG tablet  Dispense: 14 tablet; Refill: 0    Nasal congestion    - Adult Allergy/Asthma Referral    Allergic rhinitis, unspecified seasonality, unspecified trigger       Discussed sinus infections are usually caused by viruses and usually resolve within 7-10 days. Due to duration of symptoms, prescription sent to pharmacy for Augmentin twice daily for 7 days. Recommended rest, fluids especially warm clear liquids such as tea with honey and lemon, decreasing dairy which can increase congestion, humidifier, steam, nasal irrigation with saline, Zyrtec daily. Referral placed for allergy for further evaluation. COVID testing declined.    Follow-up with PCP if symptoms persist for 7 days, and sooner if symptoms worsen or new symptoms develop.     Discussed red flag symptoms which warrant immediate visit in emergency room    All questions were answered and patient verbalized understanding. AVS reviewed with patient.     Nilam Cerrato, DNP, APRN, CNP 7/7/2023 1:40 PM  [facility]          Marielos Castro is a 32 year old female who presents to clinic today for the following health issues:  Chief Complaint   Patient presents with     Urgent Care     Present for sneezing, runny nose and nasal congestion for 3-4 weeks.      Patient presents for evaluation of nasal congestion. Associated symptoms: runny nose, sneezing, headache, sinus pain/pressure, ear pressure, post-nasal drip. Denies fever, shortness of breath, teeth pain.  Symptoms have been present for the past 3-4 weeks.   She had been taking unknown allergy medication and switched to Claritin generic which helped temporarily and no longer is helping. She is allergic to grass, pollens, dust mites and wonders if allergy injections might help? Nasal sprays such as flonase give her nosebleeds.     She has a history of asthma, and  hasn't used her albuterol inhaler for months.     Problem list, Medication list, Allergies, and Medical history reviewed in EPIC.    ROS:  Review of systems negative except for noted above        Objective    /75 (BP Location: Left arm, Patient Position: Sitting, Cuff Size: Adult Large)   Pulse 66   Temp 98.4  F (36.9  C) (Tympanic)   Resp 14   Wt 85.7 kg (189 lb)   SpO2 99%   BMI 34.57 kg/m    Physical Exam  Constitutional:       General: She is not in acute distress.     Appearance: She is not toxic-appearing or diaphoretic.   HENT:      Head: Normocephalic and atraumatic.      Right Ear: Tympanic membrane, ear canal and external ear normal.      Left Ear: Tympanic membrane, ear canal and external ear normal.      Nose:      Right Turbinates: Swollen.      Left Turbinates: Swollen.      Right Sinus: Maxillary sinus tenderness and frontal sinus tenderness present.      Left Sinus: Maxillary sinus tenderness and frontal sinus tenderness present.      Comments: Moderate nasal congestion     Mouth/Throat:      Mouth: Mucous membranes are moist.      Pharynx: Oropharynx is clear. No oropharyngeal exudate or posterior oropharyngeal erythema.   Eyes:      Conjunctiva/sclera: Conjunctivae normal.   Cardiovascular:      Rate and Rhythm: Normal rate and regular rhythm.      Heart sounds: Normal heart sounds.   Pulmonary:      Effort: Pulmonary effort is normal. No respiratory distress.      Breath sounds: Normal breath sounds. No wheezing, rhonchi or rales.   Lymphadenopathy:      Cervical: No cervical adenopathy.   Skin:     General: Skin is warm and dry.   Neurological:      Mental Status: She is alert.

## 2023-07-16 ENCOUNTER — HEALTH MAINTENANCE LETTER (OUTPATIENT)
Age: 33
End: 2023-07-16

## 2023-09-19 ENCOUNTER — OFFICE VISIT (OUTPATIENT)
Dept: OBGYN | Facility: CLINIC | Age: 33
End: 2023-09-19
Payer: COMMERCIAL

## 2023-09-19 VITALS
WEIGHT: 191 LBS | SYSTOLIC BLOOD PRESSURE: 122 MMHG | HEIGHT: 62 IN | BODY MASS INDEX: 35.15 KG/M2 | OXYGEN SATURATION: 97 % | DIASTOLIC BLOOD PRESSURE: 72 MMHG | HEART RATE: 64 BPM

## 2023-09-19 DIAGNOSIS — Z13.220 SCREENING FOR HYPERLIPIDEMIA: ICD-10-CM

## 2023-09-19 DIAGNOSIS — R87.810 CERVICAL HIGH RISK HPV (HUMAN PAPILLOMAVIRUS) TEST POSITIVE: ICD-10-CM

## 2023-09-19 DIAGNOSIS — N92.6 IRREGULAR MENSES: ICD-10-CM

## 2023-09-19 DIAGNOSIS — Z01.419 ENCOUNTER FOR GYNECOLOGICAL EXAMINATION WITHOUT ABNORMAL FINDING: Primary | ICD-10-CM

## 2023-09-19 PROCEDURE — 87624 HPV HI-RISK TYP POOLED RSLT: CPT | Performed by: NURSE PRACTITIONER

## 2023-09-19 PROCEDURE — 88175 CYTOPATH C/V AUTO FLUID REDO: CPT | Performed by: NURSE PRACTITIONER

## 2023-09-19 PROCEDURE — 99395 PREV VISIT EST AGE 18-39: CPT | Performed by: NURSE PRACTITIONER

## 2023-09-19 RX ORDER — LEVONORGESTREL/ETHIN.ESTRADIOL 0.1-0.02MG
1 TABLET ORAL DAILY
Qty: 84 TABLET | Refills: 3 | Status: SHIPPED | OUTPATIENT
Start: 2023-09-19 | End: 2023-10-27

## 2023-09-19 ASSESSMENT — ENCOUNTER SYMPTOMS
MYALGIAS: 0
CHILLS: 0
NAUSEA: 0
HEARTBURN: 0
ARTHRALGIAS: 0
COUGH: 0
FREQUENCY: 1
BREAST MASS: 0
JOINT SWELLING: 0
CONSTIPATION: 0
DIARRHEA: 0
HEMATOCHEZIA: 0
FEVER: 0
SHORTNESS OF BREATH: 0
WEAKNESS: 0
HEADACHES: 1
NERVOUS/ANXIOUS: 0
ABDOMINAL PAIN: 0
EYE PAIN: 0
DIZZINESS: 0
PALPITATIONS: 0
PARESTHESIAS: 0
HEMATURIA: 0
DYSURIA: 0
SORE THROAT: 0

## 2023-09-19 NOTE — PATIENT INSTRUCTIONS
If you have any questions regarding your visit, Please contact your care team.     [email or web address] Access Services: [phone]  To Schedule an Appointment 24/7  Call: [phone] HOURS TELEPHONE NUMBER     Arianna Pierre- APRN CNP      Toby Walters-Surgery Scheduler  [facility]-Surgery Scheduler         Monday 7:30am-2:00pm    Tuesday 7:30am-4:00pm    Wednesday 7:30am-2:00pm    Thursday 7:30am-11:00am    Friday 7:30am-2:00pm [place] [address] [place] [address] [phone] ask for [facility]  [phone] Fax    Imaging Scheduling all locations  [phone]    [facility] Labor and Delivery  [address] [place] [address] Dr.  [place], [place] [id number] [phone]         Urgent Care locations:  [facility]   Monday-Friday  10 am - 8 pm  Saturday and Sunday   9 am - 5 pm     [phone]   If you need a medication refill, please contact your pharmacy. Please allow 3 business days for your refill to be completed.  As always, Thank you for trusting us with your healthcare needs!      see additional instructions from your care team below
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
## 2023-09-21 LAB
BKR LAB AP GYN ADEQUACY: NORMAL
BKR LAB AP GYN INTERPRETATION: NORMAL
BKR LAB AP HPV REFLEX: NORMAL
BKR LAB AP PREVIOUS ABNL DX: NORMAL
BKR LAB AP PREVIOUS ABNORMAL: NORMAL
PATH REPORT.COMMENTS IMP SPEC: NORMAL
PATH REPORT.COMMENTS IMP SPEC: NORMAL
PATH REPORT.RELEVANT HX SPEC: NORMAL

## 2023-09-23 LAB
HUMAN PAPILLOMA VIRUS 16 DNA: NEGATIVE
HUMAN PAPILLOMA VIRUS 18 DNA: NEGATIVE
HUMAN PAPILLOMA VIRUS FINAL DIAGNOSIS: NORMAL
HUMAN PAPILLOMA VIRUS OTHER HR: NEGATIVE

## 2023-10-27 ENCOUNTER — VIRTUAL VISIT (OUTPATIENT)
Dept: OBGYN | Facility: CLINIC | Age: 33
End: 2023-10-27
Payer: COMMERCIAL

## 2023-10-27 VITALS — WEIGHT: 191 LBS | BODY MASS INDEX: 35.15 KG/M2 | HEIGHT: 62 IN

## 2023-10-27 DIAGNOSIS — Z34.80 PRENATAL CARE, SUBSEQUENT PREGNANCY, UNSPECIFIED TRIMESTER: Primary | ICD-10-CM

## 2023-10-27 PROCEDURE — 99207 PR NO CHARGE NURSE ONLY: CPT | Mod: 93

## 2023-10-27 ASSESSMENT — PATIENT HEALTH QUESTIONNAIRE - PHQ9: SUM OF ALL RESPONSES TO PHQ QUESTIONS 1-9: 1

## 2023-10-27 NOTE — PATIENT INSTRUCTIONS
Learning About Pregnancy  Your Care Instructions     Your health in the early weeks of your pregnancy is particularly important for your baby's health. Take good care of yourself. Anything you do that harms your body can also harm your baby.  Make sure to go to all of your doctor appointments. Regular checkups will help keep you and your baby healthy.  How can you care for yourself at home?  Diet    Eat a balanced diet. Make sure your diet includes plenty of beans, peas, and leafy green vegetables.     Do not skip meals or go for many hours without eating. If you are nauseated, try to eat a small, healthy snack every 2 to 3 hours.     Do not eat fish that has a high level of mercury, such as shark, swordfish, or mackerel. Do not eat more than one can of tuna each week.     Drink plenty of fluids. If you have kidney, heart, or liver disease and have to limit fluids, talk with your doctor before you increase the amount of fluids you drink.     Cut down on caffeine, such as coffee, tea, and cola.     Do not drink alcohol, such as beer, wine, or hard liquor.     Take a multivitamin that contains at least 400 micrograms (mcg) of folic acid to help prevent birth defects. Fortified cereal and whole wheat bread are good additional sources of folic acid.     Increase the calcium in your diet. Try to drink a quart of skim milk each day. You may also take calcium supplements and choose foods such as cheese and yogurt.   Lifestyle    Make sure you go to your follow-up appointments.     Get plenty of rest. You may be unusually tired while you are pregnant.     Get at least 30 minutes of exercise on most days of the week. Walking is a good choice. If you have not exercised in the past, start out slowly. Take several short walks each day.     Do not smoke. If you need help quitting, talk to your doctor about stop-smoking programs. These can increase your chances of quitting for good.     Do not touch cat feces or litter boxes.  Also, wash your hands after you handle raw meat, and fully cook all meat before you eat it. Wear gloves when you work in the yard or garden, and wash your hands well when you are done. Cat feces, raw or undercooked meat, and contaminated dirt can cause an infection that may harm your baby or lead to a miscarriage.     Avoid things that can make your body too hot and may be harmful to your baby, such as a hot tub or sauna. Or talk with your doctor before doing anything that raises your body temperature. Your doctor can tell you if it's safe.     Avoid chemical fumes, paint fumes, or poisons.     Do not use illegal drugs, marijuana, or alcohol.   Medicines    Review all of your medicines with your doctor. Some of your routine medicines may need to be changed to protect your baby.     Use acetaminophen (Tylenol) to relieve minor problems, such as a mild headache or backache or a mild fever with cold symptoms. Do not use nonsteroidal anti-inflammatory drugs (NSAIDs), such as ibuprofen (Advil, Motrin) or naproxen (Aleve), unless your doctor says it is okay.     Do not take two or more pain medicines at the same time unless the doctor told you to. Many pain medicines have acetaminophen, which is Tylenol. Too much acetaminophen (Tylenol) can be harmful.     Take your medicines exactly as prescribed. Call your doctor if you think you are having a problem with your medicine.   To manage morning sickness    If you feel sick when you first wake up, try eating a small snack (such as crackers) before you get out of bed. Allow some time to digest the snack, and then get out of bed slowly.     Do not skip meals or go for long periods without eating. An empty stomach can make nausea worse.     Eat small, frequent meals instead of three large meals each day.     Drink plenty of fluids.     Eat foods that are high in protein but low in fat.     If you are taking iron supplements, ask your doctor if they are necessary. Iron can make  "nausea worse.     Avoid any smells, such as coffee, that make you feel sick.     Get lots of rest. Morning sickness may be worse when you are tired.   Follow-up care is a key part of your treatment and safety. Be sure to make and go to all appointments, and call your doctor if you are having problems. It's also a good idea to know your test results and keep a list of the medicines you take.  Where can you learn more?  Go to https://www.OurHistree.net/patiented  Enter E868 in the search box to learn more about \"Learning About Pregnancy.\"  Current as of: November 9, 2022               Content Version: 13.7    6792-8050 [employer].   Care instructions adapted under license by your healthcare professional. If you have questions about a medical condition or this instruction, always ask your healthcare professional. [email or web address] disclaims any warranty or liability for your use of this information.      Weeks 6 to 10 of Your Pregnancy: Care Instructions  During these weeks of pregnancy, your body goes through many changes. You may start to feel different, both in your body and your emotions. Each pregnancy is different, so there's no \"right\" way to feel. These early weeks are a time to make healthy choices for you and your pregnancy.    Take a daily prenatal vitamin. Choose one with folic acid in it.   Avoid alcohol, tobacco, and drugs (including marijuana). If you need help quitting, talk to your doctor.     Drink plenty of liquids.  Be sure to drink enough water. And limit sodas, other sweetened drinks, and caffeine.     Choose foods that are good sources of calcium, iron, and folate.  You can try dairy products, dark leafy greens, fortified orange juice and cereals, almonds, broccoli, dried fruit, and beans.     Avoid foods that may be harmful.  Don't eat raw meat, deli meat, raw seafood, or raw eggs. Avoid soft cheese and unpasteurized dairy, like Brie and blue cheese. And don't eat fish that " "contains a lot of mercury, like shark and swordfish.     Don't touch carlos litter or cat poop.  They can cause an infection that could be harmful during pregnancy.     Avoid things that can make your body too hot.  For example, avoid hot tubs and saunas.     Soothe morning sickness.  Try eating 5 or 6 small meals a day, getting some fresh air, or using lei to control symptoms.     Ask your doctor about flu and COVID-19 shots.  Getting them can help protect against infection.   Follow-up care is a key part of your treatment and safety. Be sure to make and go to all appointments, and call your doctor if you are having problems. It's also a good idea to know your test results and keep a list of the medicines you take.  Where can you learn more?  Go to https://[email or web address]/patiented  Enter G112 in the search box to learn more about \"Weeks 6 to 10 of Your Pregnancy: Care Instructions.\"  Current as of: November 9, 2022               Content Version: 13.7 2006-2023 [employer].   Care instructions adapted under license by your healthcare professional. If you have questions about a medical condition or this instruction, always ask your healthcare professional. [employer] disclaims any warranty or liability for your use of this information.         Managing Morning Sickness (01:55)  Your health professional recommends that you watch this short online health video.  Learn tips for dealing with morning sickness, no matter what time of day you have it.  Purpose:  Gives tips for managing morning sickness, including eating small low-fat meals and avoiding caffeine and spicy food.  Goal:  The user will learn tips for dealing with morning sickness during pregnancy.     How to watch the video    Scan the QR code   OR Visit the website    [email or web address].[email or web address]   Current as of: November 9, 2022               Content Version: 13.7 2006-2023 [employer].   Care instructions " adapted under license by your healthcare professional. If you have questions about a medical condition or this instruction, always ask your healthcare professional. [employer], [employer] disclaims any warranty or liability for your use of this information.      Pregnancy and Heartburn: Care Instructions  Overview     Heartburn is a common problem during pregnancy.  Heartburn happens when stomach acid backs up into the tube that carries food to the stomach. This tube is called the esophagus. Early in pregnancy, heartburn is caused by hormone changes that slow down digestion. Later on, it's also caused by the large uterus pushing up on the stomach.  Even though you can't fix the cause, there are things you can do to get relief. Treating heartburn during pregnancy focuses first on making lifestyle changes, like changing what and how you eat, and on taking medicines.  Heartburn usually improves or goes away after childbirth.  Follow-up care is a key part of your treatment and safety. Be sure to make and go to all appointments, and call your doctor if you are having problems. It's also a good idea to know your test results and keep a list of the medicines you take.  How can you care for yourself at home?  Eat small, frequent meals.  Avoid foods that make your symptoms worse, such as chocolate, peppermint, and spicy foods. Avoid drinks with caffeine, such as coffee, tea, and sodas.  Avoid bending over or lying down after meals.  Take a short walk after you eat.  If heartburn is a problem at night, do not eat for 2 hours before bedtime.  Take antacids like Mylanta, Maalox, Rolaids, or Tums. Do not take antacids that have sodium bicarbonate, magnesium trisilicate, or aspirin. Be careful when you take over-the-counter antacid medicines. Many of these medicines have aspirin in them. While you are pregnant, do not take aspirin or medicines that contain aspirin unless your doctor says it is okay.  If you're not getting  "relief, talk to your doctor. You may be able to take a stronger acid-reducing medicine.  When should you call for help?   Call your doctor now or seek immediate medical care if:    You have new or worse belly pain.     You are vomiting.   Watch closely for changes in your health, and be sure to contact your doctor if:    You have new or worse symptoms of reflux.     You are losing weight.     You have trouble or pain swallowing.     You do not get better as expected.   Where can you learn more?  Go to https://www.[email or web address].net/patiented  Enter U946 in the search box to learn more about \"Pregnancy and Heartburn: Care Instructions.\"  Current as of: November 9, 2022               Content Version: 13.7 2006-2023 [employer].   Care instructions adapted under license by your healthcare professional. If you have questions about a medical condition or this instruction, always ask your healthcare professional. [employer] disclaims any warranty or liability for your use of this information.      Constipation: Care Instructions  Overview     Constipation means that you have a hard time passing stools (bowel movements). People pass stools from 3 times a day to once every 3 days. What is normal for you may be different. Constipation may occur with pain in the rectum and cramping. The pain may get worse when you try to pass stools. Sometimes there are small amounts of bright red blood on toilet paper or the surface of stools. This is because of enlarged veins near the rectum (hemorrhoids).  A few changes in your diet and lifestyle may help you avoid ongoing constipation. Your doctor may also prescribe medicine to help loosen your stool.  Some medicines can cause constipation. These include pain medicines and antidepressants. Tell your doctor about all the medicines you take. Your doctor may want to make a medicine change to ease your symptoms.  Follow-up care is a key part of your treatment and " "safety. Be sure to make and go to all appointments, and call your doctor if you are having problems. It's also a good idea to know your test results and keep a list of the medicines you take.  How can you care for yourself at home?  Drink plenty of fluids. If you have kidney, heart, or liver disease and have to limit fluids, talk with your doctor before you increase the amount of fluids you drink.  Include high-fiber foods in your diet each day. These include fruits, vegetables, beans, and whole grains.  Get at least 30 minutes of exercise on most days of the week. Walking is a good choice. You also may want to do other activities, such as running, swimming, cycling, or playing tennis or team sports.  Take a fiber supplement, such as Citrucel or Metamucil, every day. Read and follow all instructions on the label.  Schedule time each day for a bowel movement. A daily routine may help. Take your time having a bowel movement, but don't sit for more than 10 minutes at a time. And don't strain too much.  Support your feet with a small step stool when you sit on the toilet. This helps flex your hips and places your pelvis in a squatting position.  Your doctor may recommend an over-the-counter laxative to relieve your constipation. Examples are Milk of Magnesia and MiraLax. Read and follow all instructions on the label. Do not use laxatives on a long-term basis.  When should you call for help?   Call your doctor now or seek immediate medical care if:    You have new or worse belly pain.     You have new or worse nausea or vomiting.     You have blood in your stools.   Watch closely for changes in your health, and be sure to contact your doctor if:    Your constipation is getting worse.     You do not get better as expected.   Where can you learn more?  Go to https://www.healthwise.net/patiented  Enter P343 in the search box to learn more about \"Constipation: Care Instructions.\"  Current as of: March 22, " "2023               Content Version: 13.7 2006-2023 [employer].   Care instructions adapted under license by your healthcare professional. If you have questions about a medical condition or this instruction, always ask your healthcare professional. [employer] disclaims any warranty or liability for your use of this information.      Learning About High-Iron Foods  What foods are high in iron?     The foods you eat contain nutrients, such as vitamins and minerals. Iron is a nutrient. Your body needs the right amount to stay healthy and work as it should. You can use the list below to help you make choices about which foods to eat.  Here are some foods that contain iron. They have 1 to 2 milligrams of iron per serving.  Fruits  Figs (dried), 5 figs  Vegetables  Asparagus (canned), 6 mae  Estiven, beet, Swiss chard, or turnip greens, 1 cup  Dried peas, cooked,   cup  Seaweed, spirulina (dried),   cup  Spinach, (cooked)   cup or (raw) 1 cup  Grains  Cereals, fortified with iron, 1 cup  Grits (instant, cooked), fortified with iron,   cup  Meats and other protein foods  Beans (kidney, lima, navy, white), canned or cooked,   cup  Beef or lamb, 3 oz  Chicken giblets, 3 oz  Chickpeas (garbanzo beans),   cup  Liver of beef, lamb, or pork, 3 oz  Oysters (cooked), 3 oz  Sardines (canned), 3 oz  Soybeans (boiled),   cup  Tofu (firm),   cup  Work with your doctor to find out how much of this nutrient you need. Depending on your health, you may need more or less of it in your diet.  Where can you learn more?  Go to https://[email or web address]  Enter R005 in the search box to learn more about \"Learning About High-Iron Foods.\"  Current as of: March 1, 2023               Content Version: 13.7 2006-2023 [employer].   Care instructions adapted under license by your healthcare professional. If you have questions about a medical condition or this instruction, always ask your " "healthcare professional. [employer], [facility] disclaims any warranty or liability for your use of this information.      Rh Antibodies Screening During Pregnancy: About This Test  What is it?     The Rh antibodies screening test is a blood test. It checks your blood for Rh antibodies. If you have Rh-negative blood and have been exposed to Rh-positive blood, your immune system may make antibodies to attack the Rh-positive blood. When a pregnant woman has these antibodies, it is called Rh sensitization.  Why is this test done?  The Rh antibodies screening test is done during pregnancy to find out if your baby is at risk for Rh disease. This can happen if you have Rh-negative blood and your baby has Rh-positive blood. If your Rh-negative blood mixes with Rh-positive blood, your immune system will make antibodies to attack the Rh-positive blood.  During pregnancy, these antibodies could attach to the baby's red blood cells. This can cause your baby to have serious health problems. The results of this test will help your doctor know how to best care for you and your baby during your pregnancy.  How do you prepare for the test?  In general, there's nothing you have to do before this test, unless your doctor tells you to.  How is the test done?  A health professional uses a needle to take a blood sample, usually from the arm.  What happens after the test?  You will probably be able to go home right away. It depends on the reason for the test.  You can go back to your usual activities right away.  Follow-up care is a key part of your treatment and safety. Be sure to make and go to all appointments, and call your doctor if you are having problems. It's also a good idea to keep a list of the medicines you take. Ask your doctor when you can expect to have your test results.  Where can you learn more?  Go to https://www.mSpot.net/patiented  Enter P722 in the search box to learn more about \"Rh Antibodies Screening " "During Pregnancy: About This Test.\"  Current as of: 2022               Content Version: 13.7    7841-0809 [employer].   Care instructions adapted under license by your healthcare professional. If you have questions about a medical condition or this instruction, always ask your healthcare professional. [employer] disclaims any warranty or liability for your use of this information.      Learning About Preventing Rh Disease  What is Rh disease?     Rh disease can be a serious problem in pregnancy. It happens when substances called antibodies in the mother's blood cause red blood cells in her baby's blood to be destroyed. This can occur when the blood types of a mother and her baby do not match.  All blood has an Rh factor. This is what makes a blood type positive or negative. When you are Rh-negative, your baby may be Rh-negative or Rh-positive. If your baby has Rh-positive blood and it mixes with yours, your body will make antibodies. This is called Rh sensitization.  Most of the time, this is not a problem in a first pregnancy. But in future pregnancies, it could cause Rh disease.  A  with Rh disease has mild anemia and may have jaundice. In severe cases, anemia, jaundice, and swelling can be very dangerous or fatal. Some babies need to be delivered early. Some need special care in the NICU. A very sick baby will need a blood transfusion before or after birth.  Fortunately, Rh sensitization is usually easy to prevent.  That's why it's important to get your Rh status checked in your first trimester. It doesn't cause any warning signs. A blood test is the only way to know if you are Rh-sensitive or are at risk for it.  How can you prevent Rh disease?  If you are Rh-negative, your doctor gives you an Rh immune globulin shot (such as RhoGAM). It helps prevent your body from making the antibodies that attack your baby's red blood cells.  Timing is important. You need the " "shot at certain times during your pregnancy. And you need one anytime there is a chance that your baby's blood might mix with yours. That can happen with certain prenatal tests or when you have pregnancy bleeding, such as:  Right after any pregnancy loss, amniocentesis, or CVS testing.  After turning of a breech baby.  Before and maybe after childbirth. Your doctor gives you a shot around week 28. If your  is Rh-positive, you will have another shot.  Follow-up care is a key part of your treatment and safety. Be sure to make and go to all appointments, and call your doctor if you are having problems. It's also a good idea to know your test results and keep a list of the medicines you take.  Where can you learn more?  Go to https://www.[email or web address]/patiented  Enter W177 in the search box to learn more about \"Learning About Preventing Rh Disease.\"  Current as of: 2022               Content Version: 13.7    0522-5149 [employer].   Care instructions adapted under license by your healthcare professional. If you have questions about a medical condition or this instruction, always ask your healthcare professional. [employer] disclaims any warranty or liability for your use of this information.      Learning About Rh Immunoglobulin Shots  Introduction     An Rh immunoglobulin shot is given to pregnant women who have Rh-negative blood.  You may have Rh-negative blood, and your baby may have Rh-positive blood. If the two types of blood mix, your body will make antibodies. This is called Rh sensitization. Most of the time, this is not a problem the first time you're pregnant. But it could cause problems in future pregnancies.  This shot keeps your body from making the antibodies. You get the shot around 28 weeks of pregnancy. After the birth, your baby's blood is tested. If the blood is Rh positive, you will get another shot. You may also get the shot if you have vaginal bleeding " "while you are pregnant or if you have a miscarriage. These shots protect future pregnancies.  Women with Rh negative blood will need this shot each time they get pregnant.  Example  Rh immunoglobulin (HypRho-D, MICRhoGAM, and RhoGAM)  Possible side effects  Rare side effects may include:  Some mild pain where you got the shot.  A slight fever.  An allergic reaction.  You may have other side effects not listed here. Check the information that comes with your medicine.  What to know about taking this medicine  You may need more than one shot. You may need the shot again:  After amniocentesis, fetal blood sampling, or chorionic villus sampling tests.  If you have bleeding in your second or third trimester.  After turning of a breech baby.  After an injury to the belly while you are pregnant.  After a miscarriage or an .  Before or right after treatment for an ectopic or a partial molar pregnancy.  Tell your doctor if you have any allergies or have had a bad response to medicines in the past.  If you get this shot within 3 months of getting a live-virus vaccine, the vaccine may not work. Your doctor will tell you if you need more vaccine.  Check with your doctor or pharmacist before you use any other medicines. This includes over-the-counter medicines. Make sure your doctor knows all of the medicines, vitamins, herbs, and supplements you take. Taking some medicines at the same time can cause problems.  Where can you learn more?  Go to https://www.[employer].net/patiented  Enter V615 in the search box to learn more about \"Learning About Rh Immunoglobulin Shots.\"  Current as of: 2022               Content Version: 13.7    5808-6009 [employer].   Care instructions adapted under license by your healthcare professional. If you have questions about a medical condition or this instruction, always ask your healthcare professional. [employer] disclaims any warranty or liability for " your use of this information.      Rubella ([place] Measles): Care Instructions  Overview  Rubella, also called [place] measles or 3-day measles, is a disease caused by a virus. It spreads by coughs, sneezes, and close contact. Rubella usually is mild and does not cause long-term problems. But if you are pregnant and get it, you can give the disease to your unborn baby. This can cause serious birth defects.  While you have rubella, you may get a rash and a mild fever, and the lymph glands in your neck may swell. Older children often have a fever, eye pain, a sore throat, and body aches. You can relieve most symptoms with care at home. Avoid being around others, especially pregnant people, until your rash has been gone for at least 4 days. People who have not had this disease before or have not had the vaccine have the greatest chance of getting the virus.  Follow-up care is a key part of your treatment and safety. Be sure to make and go to all appointments, and call your doctor if you are having problems. It's also a good idea to know your test results and keep a list of the medicines you take.  How can you care for yourself at home?  Drink plenty of fluids. If you have kidney, heart, or liver disease and have to limit fluids, talk with your doctor before you increase the amount of fluids you drink.  Get plenty of rest to help your body heal.  Take an over-the-counter pain medicine, such as acetaminophen (Tylenol), ibuprofen (Advil, Motrin), or naproxen (Aleve), to reduce fever and discomfort. Read and follow all instructions on the label. Do not give aspirin to anyone younger than 20. It has been linked to Reye syndrome, a serious illness.  Do not take two or more pain medicines at the same time unless the doctor told you to. Many pain medicines have acetaminophen, which is Tylenol. Too much acetaminophen (Tylenol) can be harmful.  Try not to scratch the rash. Put cold, wet cloths on the rash to reduce itching.  Do  "not smoke. Smoking can make your symptoms worse. If you need help quitting, talk to your doctor about stop-smoking programs and medicines. These can increase your chances of quitting for good.  Avoid contact with people who have never had rubella and who have not been immunized.  When should you call for help?   Call your doctor now or seek immediate medical care if:    You have a fever with a stiff neck or a severe headache.     You are sensitive to light or feel very sleepy or confused.   Watch closely for changes in your health, and be sure to contact your doctor if:    You do not get better as expected.   Where can you learn more?  Go to https://www.[facility].net/patiented  Enter B812 in the search box to learn more about \"Rubella (Hebrew Measles): Care Instructions.\"  Current as of: 2022               Content Version: 13.7    5294-3629 [employer].   Care instructions adapted under license by your healthcare professional. If you have questions about a medical condition or this instruction, always ask your healthcare professional. [employer] disclaims any warranty or liability for your use of this information.      Gonorrhea and Chlamydia: About These Tests  What is it?  These tests use a sample of urine or other body fluid to look for the bacteria that cause these sexually transmitted infections (STIs). The fluid sample can come from the cervix, vagina, rectum, throat, or eyes.  Why is this test done?  These tests may be done to:  Find out if symptoms are caused by gonorrhea or chlamydia.  Check people who are at high risk of being infected with gonorrhea or chlamydia.  Retest people several months after they have been treated for gonorrhea or chlamydia.  Check for infection in your  if you had a gonorrhea or chlamydia infection at the time of delivery.  How can you prepare for the test?  If you are going to have a urine test, do not urinate for at least 1 hour " "before the test.  If you think you may have chlamydia or gonorrhea, don't have sexual intercourse until you get your test results. And you may want to have tests for other STIs, such as HIV.  How is the test done?  For a direct sample, a swab is used to collect body fluid from the cervix, vagina, rectum, throat, or eyes. Your doctor may collect the sample. Or you may be given instructions on how to collect your own sample.  For a urine sample, you will collect the urine that comes out when you first start to urinate. Don't wipe the genital area clean before you urinate.  How long does the test take?  The test will take a few minutes.  What happens after the test?  You will be able to go home right away.  You can go back to your usual activities right away.  If you do have an infection, don't have sexual intercourse for 7 days after you start treatment. And your sex partner(s) should also be treated.  Follow-up care is a key part of your treatment and safety. Be sure to make and go to all appointments, and call your doctor if you are having problems. It's also a good idea to keep a list of the medicines you take. Ask your doctor when you can expect to have your test results.  Where can you learn more?  Go to https://[email or web address]/patiented  Enter K976 in the search box to learn more about \"Gonorrhea and Chlamydia: About These Tests.\"  Current as of: August 2, 2022               Content Version: 13.7    3304-9430 [employer].   Care instructions adapted under license by your healthcare professional. If you have questions about a medical condition or this instruction, always ask your healthcare professional. [employer] disclaims any warranty or liability for your use of this information.      Trichomoniasis: About This Test  What is it?     This test uses a sample of urine or other body fluid to look for the tiny parasite that causes trichomoniasis (also called trich). The fluid sample " can come from the vagina, cervix, or urethra. Your doctor may choose to use one or more of many available tests.  Why is it done?  A trich test may be done to:  Find out if symptoms are caused by trich.  Check people who are at high risk for being infected with trich.  Check after treatment to make sure that the infection is gone.  How do you prepare for the test?  If you are going to have a urine test, do not urinate for at least 1 hour before the test.  How is the test done?  For a direct sample, a swab is used to collect body fluid from the cervix, vagina, or urethra. Your doctor may collect the sample. Or you may be given instructions on how to collect your own sample.  For a urine sample, you will collect the urine that comes out when you first start to urinate. Don't wipe the area clean before you urinate.  How long does the test take?  It will take a few minutes to collect a sample.  What happens after the test?  You can go home right away.  You can go back to your usual activities right away.  You may get the test results the same day or several days later. It depends on the test used.  If you do have an infection, don't have sexual intercourse for 7 days after you start treatment. Your sex partner(s) should also be treated.  Follow-up care is a key part of your treatment and safety. Be sure to make and go to all appointments, and call your doctor if you are having problems. Ask your doctor when you can expect to have your test results.  Current as of: August 2, 2022               Content Version: 13.7    5855-3984 [employer].   Care instructions adapted under license by your healthcare professional. If you have questions about a medical condition or this instruction, always ask your healthcare professional. [employer] disclaims any warranty or liability for your use of this information.      HIV Testing: Care Instructions  Overview     You can get tested for the human  "immunodeficiency virus (HIV). This test checks for HIV antibodies and antigens in your blood. If they are found, the test is positive.  If HIV antibodies or antigens are not found, you may need a repeat test to be sure the results are correct. Or your doctor may want to do a different test.  Follow-up care is a key part of your treatment and safety. Be sure to make and go to all appointments, and call your doctor if you are having problems. It's also a good idea to know your test results and keep a list of the medicines you take.  What do the results mean?  Normal result  A normal result means that no HIV antibodies or antigens were found in your blood. And if you had a test that checked for HIV RNA or DNA, none was found. Normal results are called negative.  You may need more testing to be sure the test results are correct.  Uncertain result  Test results don't clearly show whether you have an HIV infection. This is usually called an indeterminate result. This may happen before HIV antibodies or antigens develop. Or it may happen when some other type of antibody or antigen interferes with the results. If this occurs, you will probably have another test right away.  Abnormal result  An abnormal result means that you have HIV antibodies or antigens in your blood. These results are called positive.  A positive test will be confirmed by another type of test. This is because some tests can cause false-positive results. No one is considered HIV-positive until the result is confirmed by a test that shows HIV RNA or DNA in the person's blood.  If your test result is positive, your doctor will talk to you about starting treatment.  Where can you learn more?  Go to [email or web address]/patiented  Enter T792 in the search box to learn more about \"HIV Testing: Care Instructions.\"  Current as of: October 31, 2022               Content Version: 13.7    2177-0703 [employer].   Care instructions adapted under " license by your healthcare professional. If you have questions about a medical condition or this instruction, always ask your healthcare professional. [employer] disclaims any warranty or liability for your use of this information.      Hepatitis C Virus Tests: About These Tests  What are they?     Hepatitis C virus tests are blood tests that check for substances in the blood that show whether you have hepatitis C now or had it in the past. The tests can also tell you what type of hepatitis C you have and how severe the disease is. This can help your doctor with treatment.  If the tests show that you have long-term hepatitis C, you need to take steps to prevent spreading the disease.  Why are these tests done?  You may need these tests if:  You have symptoms of hepatitis.  You may have been exposed to the virus. You are more likely to have been exposed to the virus if you inject drugs or are exposed to body fluids (such as if you are a health care worker).  You've had other tests that show you have liver problems.  You are 18 to 79 years old.  You have an HIV infection.  The tests also are done to help your doctor decide about your treatment and see how well it works.  How do you prepare for the test?  In general, there's nothing you have to do before this test, unless your doctor tells you to.  How is the test done?  A health professional uses a needle to take a blood sample, usually from the arm.  What happens after these tests?  You will probably be able to go home right away.  You can go back to your usual activities right away.  Follow-up care is a key part of your treatment and safety. Be sure to make and go to all appointments, and call your doctor if you are having problems. It's also a good idea to keep a list of the medicines you take. Ask your doctor when you can expect to have your test results.  Where can you learn more?  Go to https://www.[employer].net/patiented  Enter W551 in the search  "box to learn more about \"Hepatitis C Virus Tests: About These Tests.\"  Current as of: October 31, 2022               Content Version: 13.7    7317-0377 [employer].   Care instructions adapted under license by your healthcare professional. If you have questions about a medical condition or this instruction, always ask your healthcare professional. [employer] disclaims any warranty or liability for your use of this information.      Learning About Fetal Ultrasound Results  What is a fetal ultrasound?     Fetal ultrasound is a test that lets your doctor see an image of your baby. Your doctor learns information about your baby from this picture. You may find out, for example, if you are having a boy or a girl. But the main reason you have this test is to get information about your baby's health.  (You may hear your baby called a fetus. This is a common medical term for a baby that's growing in the mother's uterus.)  What kind of information can you learn from this test?  The findings of an ultrasound fall into two categories, normal and abnormal.  Normal  The fetus is the right size for its age.  The placenta is the expected size and does not cover the cervix.  There is enough amniotic fluid in the uterus.  No birth defects can be seen.  Abnormal  The fetus is small or large for its age.  The placenta covers the cervix.  There is too much or too little amniotic fluid in the uterus.  The fetus may have a birth defect.  What does an abnormal result mean?  Abnormal seems to imply that something is wrong with your baby. But what it means is that the test has shown something the doctor wants to take a closer look at.  And that's what happens next. Your doctor will talk to you about what further test or tests you may need.  What do the results mean?  Some of the things your doctor may see on an abnormal ultrasound include:  Echogenic bowel.  The bowel looks very bright on the screen. This could " mean that there's blood in the bowel. Or it could mean that something is blocking the small bowel.  Increased nuchal translucency.  The ultrasound measures the thickness at the back of the baby's neck. An increase in thickness is sometimes an early sign of Down syndrome.  Increased or decreased amniotic fluid.  The doctor will look for a reason for the level of amniotic fluid and will watch the pregnancy closely as it progresses.  Large ventricles.  Ventricles in the brain look larger than they should. Your doctor may take a closer look at the brain.  Renal pyelectasis/hydronephrosis.  The ultrasound measures the fluid around the kidney. If there is more fluid than expected, there is a chance of urinary tract or kidney problems.  Short long bones.  The ultrasound measures certain arm and leg bones. A long bone (humerus or femur) that is shorter than average could be a sign of Down syndrome.  Subchorionic hemorrhage.  An ultrasound can show bleeding under one of the membranes that surrounds the fetus. Some women don't have symptoms of bleeding. The ultrasound can find this problem when women are not bleeding from their vagina. Women who have this condition have a slightly higher chance of miscarriage.  What do you do now?  Take a deep breath, and let it out. Keep in mind that an abnormal finding on an ultrasound, after it's coupled with more information, may:  Turn out to be nothing.  Turn out to be something mild that won't affect the baby.  Turn out to be something more serious. But if this happens, early diagnosis helps you and your doctor plan treatment options sooner rather than later.  Your medical team is there for you. So are your family and friends. Ask questions, and get the help and support you need.  Follow-up care is a key part of your treatment and safety. Be sure to make and go to all appointments, and call your doctor if you are having problems. It's also a good idea to know your test results and keep  "a list of the medicines you take.  Where can you learn more?  Go to https://www.[employer].net/patiented  Enter K451 in the search box to learn more about \"Learning About Fetal Ultrasound Results.\"  Current as of: November 9, 2022               Content Version: 13.7    8709-1731 [employer].   Care instructions adapted under license by your healthcare professional. If you have questions about a medical condition or this instruction, always ask your healthcare professional. [employer] disclaims any warranty or liability for your use of this information.      Learning About Prenatal Visits  Your Care Instructions     Regular prenatal visits are very important during any pregnancy. These quick office visits may seem simple and routine. But they can help you and your baby stay healthy. Your doctor is watching for problems that can only be found by regularly checking you and your baby. The visits also give you and your doctor time to build a good relationship.  Many women have prenatal visits every 4 to 6 weeks until week 28 of pregnancy. Then the visits become more frequent. This is often every 2 to 3 weeks through week 36 of pregnancy. In the final month of pregnancy, you likely will see your doctor every week. You may have a different schedule if you have a medical problem or are a teen.  At different times in your pregnancy, you will have exams and tests. Some are routine. Others are done only when there is a chance of a problem. Everything healthy you do for your body helps your growing baby. Rest when you need it. Eat well, drink plenty of water, and exercise regularly.  What happens during a prenatal visit?  You will have blood pressure checks, along with urine tests. You also may have blood tests. If you need to go to the bathroom while waiting for the doctor, tell the nurse. He or she will give you a sample cup so your urine can be tested.  You will be weighed and have your belly " measured.  Your doctor may listen to your baby's heartbeat with a special stethoscope.  In your second trimester, your doctor will check your blood sugar (glucose tolerance test) for diabetes that can occur during pregnancy. This is gestational diabetes, which can harm your baby.  You will have tests to check for infections that could harm your . These include group B streptococcus and hepatitis B.  Your doctor may do ultrasounds to check for problems. This also checks your baby's position. An ultrasound uses sound waves to produce a picture of your baby.  You may have other tests at any time during your pregnancy.  Use your visits to discuss with your doctor any concerns you have.  How can you care for yourself at home?  Get plenty of rest.  Exercise every day, if your doctor says it is okay. If you have not exercised in the past, start out slowly. Take many short walks each day.  Eat a balanced diet. Make sure your diet includes plenty of beans, peas, and leafy green vegetables.  Drink plenty of fluids. Cut down on drinks with caffeine, such as coffee, tea, and cola. If you have kidney, heart, or liver disease and have to limit fluids, talk with your doctor before you increase the amount of fluids you drink.  Avoid chemical fumes, paint fumes, and poisons. Do not use alcohol, marijuana, or illegal drugs. Do not smoke, vape, or use tobacco. If you need help quitting, talk to your doctor about stop-smoking programs and medicines. These can increase your chances of quitting for good.  Review all of your medicines with your doctor. Some of your routine medicines may need to be changed to protect your baby. Do not stop or start taking any medicines without talking to your doctor first.  Follow-up care is a key part of your treatment and safety. Be sure to make and go to all appointments, and call your doctor if you are having problems. It's also a good idea to know your test results and keep a list of the  "medicines you take.  Where can you learn more?  Go to https://www.[employer].net/patiented  Enter J502 in the search box to learn more about \"Learning About Prenatal Visits.\"  Current as of: November 9, 2022               Content Version: 13.7    8423-4688 [employer].   Care instructions adapted under license by your healthcare professional. If you have questions about a medical condition or this instruction, always ask your healthcare professional. [employer] disclaims any warranty or liability for your use of this information.      Domestic Abuse: Care Instructions  Overview     If you want to save this information but don't think it is safe to take it home, see if a trusted friend can keep it for you. Plan ahead. Know who you can call for help, and memorize the phone number.   Be careful online too. Your online activity may be seen by others. Do not use your personal computer or device to read about this topic. Use a safe computer such as one at work, a friend's house, or a library.    Domestic abuse is different from an argument now and then. It is a pattern of abuse that one person uses to control another person's behavior. It may start with threats and name-calling. Then, it may lead to more serious acts, like pushing and slapping. The abuse also may occur in other areas. For example, the abuser may withhold money or spend a partner's money without their knowledge.  Abuse can cause serious harm. You are more likely to have a long-term health problem from the injuries and stress of living in a violent relationship. People who are sexually abused by their partners have more sexually transmitted infections and unwanted pregnancies. Anyone can be abused in relationships. Anyone who is abused also faces emotional pain.  If you are pregnant, abuse can cause problems such as poor weight gain, infections, and bleeding. Abuse during this time may increase your baby's risk of low birth " "weight, premature birth, and death.  Follow-up care is a key part of your treatment and safety. Be sure to make and go to all appointments, and call your doctor if you are having problems. It's also a good idea to know your test results and keep a list of the medicines you take.  How can you care for yourself at home?  If you do not have a safe place to stay, discuss this with your doctor before you leave.  Have a plan for where to go, how to leave your house, and where to stay in case of an emergency. Do not tell your partner about your plan. Contact:  The National Domestic Violence Hotline toll-free at 1-606.295.1975. They can help you find resources in your area.  Your local police department, hospital, or clinic for information about shelters and safe homes near you.  Talk to a trusted friend or neighbor or a [employer] counselor. Do not feel that you have to hide what happened.  Teach your children how to call for help in an emergency.  Be alert to warning signs, such as threats, heavy alcohol use, or drug use. This can help you avoid danger.  If you can, make sure that there are no guns or other weapons in the house.  When should you call for help?   Call 911 anytime you think you may need emergency care. For example, call if:    You or someone else has just been abused.     You think you or someone else is in danger of being abused.   Watch closely for changes in your health, and be sure to contact your doctor if you have any problems.  Where can you learn more?  Go to https://www.[employer]net/patiented  Enter G282 in the search box to learn more about \"Domestic Abuse: Care Instructions.\"  Current as of: February 27, 2023               Content Version: 13.7    0194-4759 [employer].   Care instructions adapted under license by your healthcare professional. If you have questions about a medical condition or this instruction, always ask your healthcare professional. [employer], [employer] " disclaims any warranty or liability for your use of this information.      Domestic Violence Safety Instructions: Care Instructions  Overview     If you want to save this information but don't think it is safe to take it home, see if a trusted friend can keep it for you. Plan ahead. Know who you can call for help, and memorize the phone number.   Be careful online too. Your online activity may be seen by others. Do not use your personal computer or device to read about this topic. Use a safe computer such as one at work, a friend's house, or a library.    When you are abused by a spouse or partner, you can take actions to protect yourself and your children.  You can increase your safety whether you decide to stay with your spouse or partner or you decide to leave. You can also prepare an action plan and kit ahead of time. This will allow you to leave quickly when you decide to. Remember, you cannot stop your partner's abuse, but you can find help for you and your children. No one deserves to be abused.  Follow-up care is a key part of your treatment and safety. Be sure to make and go to all appointments, and call your doctor if you are having problems. It's also a good idea to know your test results and keep a list of the medicines you take.  How can you care for yourself at home?  Make a plan for your safety   If you decide to stay with your abusive spouse or partner, you can do the following to increase your safety:  Decide what works best to keep you safe in an emergency.  Decide who you can call to help you in an emergency.  Decide if you will call the police if you get hurt again. If you can, agree on a signal with your children or neighbor to call the police for you if you need help. You can flash lights or hang something out of a window.  Choose a place to go for a short time if you need to leave home. Memorize the address and phone number.  Learn escape routes out of your home in case you need to leave in a  hurry. Teach your children different ways to get out of the house quickly if they need to.  Take objects that can be used as weapons (guns, knives, hammers) and hide them or lock them up.  Learn the number of a domestic violence shelter. Talk to the people there about how they can help.  Find out about other community resources that can help you.  Take pictures of bruises or other injuries if you can. You can also take pictures of things your abuser has broken.  Teach your children that violence is never okay. Tell them that they do not deserve to be hurt.  Pack a bag   Prepare a kit with things you will need if you leave the house suddenly. You can try to hide this in your house, or you can leave it with a friend or relative you can trust. You should include the following items in the kit:  A set of keys to your house and car.  Emergency phone numbers and addresses. You might also want to have a map and a small flashlight in case you need to leave in the night.  Money such as cash or checks. You can also ask a trusted friend or family member to hold money for you.  Copies of legal documents such as house and car titles or rent receipts, birth certificates, Social Security card, voter registration, marriage and 's licenses, and your children's health records.  Personal items you would need for a few days, such as clothes, a toothbrush, toothpaste, and any medicines you or your children need.  A favorite toy or book for your child or children.  Diapers and bottles, if you have very young children.  Pictures that show signs of abuse and violence. You may also add pictures of your abuser.  If you leave   If you decide to leave, you can take the following steps:  Go to the emergency room at a hospital if you have been hurt.  Ask the police to be with you as you leave. They can protect you as you leave the house.  If you decide to leave secretly, remember that activities can be tracked. Your abuser may still have  "access to your cell phone, email, and credit cards. It may be possible for these to be traced. Always be aware of your surroundings.  Take the kit you have prepared. If this is an emergency, do not worry about gathering up anything. Just leave--your safety is most important.  If your abuser moves out, change the locks on the doors. If you have a security system, change the access code.  When should you call for help?   Call 911 anytime you think you may need emergency care. For example, call if:    You or someone else has just been abused.     You think you or someone else is in danger of being abused.   Watch closely for changes in your health, and be sure to contact your doctor if you have any problems.  Where can you learn more?  Go to https://www.[employer].net/patiented  Enter A752 in the search box to learn more about \"Domestic Violence Safety Instructions: Care Instructions.\"  Current as of: October 20, 2022               Content Version: 13.7    9964-5929 [employer].   Care instructions adapted under license by your healthcare professional. If you have questions about a medical condition or this instruction, always ask your healthcare professional. [employer] disclaims any warranty or liability for your use of this information.      Learning About Domestic Abuse  What is domestic abuse?  Domestic abuse is threats or violent behavior in a personal relationship. It can happen between past or current partners or spouses. It's also called domestic violence or intimate partner violence.  Domestic abuse can affect people of any ethnic group, race, or Baptist. It can affect teens, adults, or the elderly. And it can happen to people of any sexual identity or social status. But most abuse victims are women.  Abusers use fear, bullying, and threats to control their partners. They control what their partners do, where they go, or who they see. They may act jealous, controlling, or " possessive. These early signs of abuse may happen soon after the start of the relationship. Sometimes it can be hard to notice abuse at first. But after the relationship becomes more serious, the abuse may get worse.  If you are being abused in your relationship, it's important to get help. The abuse is not your fault, and you don't have to face it alone.  Be careful  It may not be safe to take home domestic abuse information like this handout. Some people ask a trusted friend to keep it for them. It's also important to plan ahead and to memorize the phone number of places you can go for help. If you are concerned about your safety, do not use your computer, smartphone, or tablet to read about domestic abuse.   What are the types of domestic abuse?  Abuse can be emotional, physical, or sexual.  Emotional abuse  Emotional abuse is a pattern of threats, insults, or controlling behavior. It includes verbal abuse. It goes beyond healthy disagreements in a relationship. It's a sign of an unhealthy relationship, and it may be against the law.  Do you feel threatened, intimidated, or controlled? Does your partner threaten your children, other family members, or pets?  Does your partner:  Use jokes meant to embarrass or shame you?  Call you names?  Tell you that you are a bad parent or threaten to take away your children?  Threaten to have you or your family members deported?  Control your access to money or other basic needs?  Control what you do, who you see or talk to, or where you go?  Another form of emotional abuse is denying that it is happening. Or the abuser may act like the abuse is no big deal or is your fault.  Sexual abuse  With sexual abuse, abusers may try to convince or force you to have sex. They may force you into sex acts you're not comfortable with. Or they may sexually assault you. Sexual abuse can happen even if you are in a committed relationship.  Physical abuse  Physical abuse means that a partner  hits, kicks, or physically hurts you. Physical abuse that starts with a slap might lead to kicking, shoving, and choking over time. The abuser may also threaten to hurt or kill you.  What problems can domestic abuse lead to?  Domestic abuse can be very dangerous. It can cause serious, repeated injury. It can even lead to death.  All forms of abuse can cause long-term health problems from the stress of a violent relationship. Verbal abuse can lead to sexual and physical abuse.  Abuse causes emotional pain, depression, anxiety, and post-traumatic stress. Sexual abuse can lead to sexually transmitted infections (including HIV/AIDS) and unplanned pregnancy.  Pregnancy can be a very dangerous time for people in abusive relationships. Pregnant people who are abused may have anemia, infections, bleeding, or poor weight gain. Abuse during this time may also increase your baby's risk of low birth weight, premature birth, and death.  It can be hard for some victims of domestic abuse to ask for help or to leave their relationship. You may feel scared, stuck, or not sure what steps to take. But it's important not to ignore abuse. Talking to someone could be the first step to ending the abuse and taking care of your own health and happiness again.  Where can you get help?  Talk to a trusted friend. Find a local advocacy group, or talk to your doctor about the abuse.  Contact the National Domestic Violence Hotline at 9-938-152-UCAN (1-398.892.5601) for more safety tips. They can guide you to groups in your area that can help. Or go to the National Coalition Against Domestic Violence website at www.thehotline.org to learn more.  Domestic violence groups or a counselor in your area can help you make a safety plan for yourself and your children.  When to call for help  Call 911 anytime you think you may need emergency care. For example, call if:  You think that you or someone you know is in danger of being abused.  You have been  "hurt and can't have someone safely take you to emergency care.  You have just been abused.  A family member has just been abused.  Where can you learn more?  Go to https://www.[employer].net/patiented  Enter S665 in the search box to learn more about \"Learning About Domestic Abuse.\"  Current as of: February 27, 2023               Content Version: 13.7    3721-1714 [employer].   Care instructions adapted under license by your healthcare professional. If you have questions about a medical condition or this instruction, always ask your healthcare professional. [employer] disclaims any warranty or liability for your use of this information.      Vaginal Bleeding During Pregnancy: Care Instructions  Overview     It's common to have some vaginal spotting when you are pregnant. In some cases, the bleeding isn't serious. And there aren't any more problems with the pregnancy.  But sometimes bleeding is a sign of a more serious problem. This is more common if the bleeding is heavy or painful. Examples of more serious problems include miscarriage, an ectopic pregnancy, and a problem with the placenta.  You may have to see your doctor again to be sure everything is okay. You may also need more tests to find the cause of the bleeding.  Home treatment may be all you need. But it depends on what is causing the bleeding. Be sure to tell your doctor if you have any new symptoms or if your symptoms get worse.  The doctor has checked you carefully, but problems can develop later. If you notice any problems or new symptoms, get medical treatment right away.  Follow-up care is a key part of your treatment and safety. Be sure to make and go to all appointments, and call your doctor if you are having problems. It's also a good idea to know your test results and keep a list of the medicines you take.  How can you care for yourself at home?  If your doctor prescribed medicines, take them exactly as directed. Call " "your doctor if you think you are having a problem with your medicine.  Do not have vaginal sex until your doctor says it's okay.  Do not put anything in your vagina until your doctor says it's okay.  Ask your doctor about other activities you can or can't do.  Get a lot of rest. Being pregnant can make you tired.  Do not use nonsteroidal anti-inflammatory drugs (NSAIDs), such as ibuprofen (Advil, Motrin), naproxen (Aleve), or aspirin, unless your doctor says it is okay.  When should you call for help?   Call 911 anytime you think you may need emergency care. For example, call if:    You passed out (lost consciousness).     You have severe vaginal bleeding. This means you are soaking through a pad each hour for 2 or more hours.     You have sudden, severe pain in your belly or pelvis.   Call your doctor now or seek immediate medical care if:    You have new or worse vaginal bleeding.     You are dizzy or lightheaded, or you feel like you may faint.     You have pain in your belly, pelvis, or lower back.     You think that you are in labor.     You have a sudden release of fluid from your vagina.     You've been having regular contractions for an hour. This means that you've had at least 8 contractions within 1 hour or at least 4 contractions within 20 minutes, even after you change your position and drink fluids.     You notice that your baby has stopped moving or is moving much less than normal.   Watch closely for changes in your health, and be sure to contact your doctor if you have any problems.  Where can you learn more?  Go to https://www.[employer].net/patiented  Enter N829 in the search box to learn more about \"Vaginal Bleeding During Pregnancy: Care Instructions.\"  Current as of: November 9, 2022               Content Version: 13.7    4002-4043 [employer].   Care instructions adapted under license by your healthcare professional. If you have questions about a medical condition or this " instruction, always ask your healthcare professional. [employer] disclaims any warranty or liability for your use of this information.

## 2023-10-27 NOTE — PROGRESS NOTES
Telephone visit with patient for New Prenatal Intake and Education. This is patient's 3rd pregnancy. Handouts reviewed and will be provided at next prenatal appointment. Scheduled for New Prenatal with Dr. Ashraf on 11/6/23.       Prenatal OB Questionnaire  Patient supplied answers from flow sheet for:  Prenatal OB Questionnaire.  Past Medical History  Have you ever recieved care for your mental health? : (!) Yes (in high school and then had post partum depression after first child.  hasn't needed anything since though)  Have you ever been in a major accident or suffered serious trauma?: No  Within the last year, has anyone hit, slapped, kicked or otherwise hurt you?: No  In the last year, has anyone forced you to have sex when you didn't want to?: No    Past Medical History 2   Have you ever received a blood transfusion?: No  Would you accept a blood transfusion if was medically recommended?: Yes  Does anyone in your home smoke?: (!) Yes (but they smoke outside)   Is your blood type Rh negative?: (!) Yes  Have you ever ?: No  Have you been hospitalized for a nonsurgical reason excluding normal delivery?: (!) Yes (admitted to [facility] after first child due to sepsis and admitted to [facility] after second delivery due to kidney issues)  Have you ever had an abnormal pap smear?: (!) Yes (most recent was normal)    Past Medical History (Continued)  Do you have a history of abnormalities of the uterus?: No  Did your mother take JESSE or any other hormones when she was pregnant with you?: Unknown  Do you have any other problems we have not asked about which you feel may be important to this pregnancy?: No        Allergies as of 10/27/2023:    Allergies as of 10/27/2023 - Reviewed 09/19/2023   Allergen Reaction Noted    Dust mites  03/04/2016    Fluoxetine  05/28/2014    Morphine and related Nausea and Vomiting 10/27/2009    Venlafaxine  09/02/2015       Current medications are:  Current Outpatient Medications   Medication  Sig Dispense Refill    levonorgestrel-ethinyl estradiol (AVIANE) 0.1-20 MG-MCG tablet Take 1 tablet by mouth daily 84 tablet 3         Early ultrasound screening tool:    Does patient have irregular periods?  No  Did patient use hormonal birth control in the three months prior to positive urine pregnancy test? No  Is the patient breastfeeding?  No  Is the patient 10 weeks or greater at time of education visit?  No      Dating US ordered per new standing orders.  Advised pt to schedule this for when she is 8 weeks along but advised to get done prior to seeing the provider for her first prenatal visit.    Lashay Cantu RN

## 2023-11-02 NOTE — PATIENT INSTRUCTIONS
Weeks 10 to 14 of Your Pregnancy: Care Instructions  It's now possible to hear the fetus's heartbeat with a special ultrasound device. And the fetus's organs are developing.    Decide about tests to check for birth defects. Think about your age, your chance of passing on a family disease, your need to know about any problems, and what you might do after you have the test results.   It's okay to exercise. Try activities such as walking or swimming. Check with your doctor before starting a new program.     You may feel more tired than usual.  Taking naps during the day may help.     You may feel emotional.  It might help to talk to someone.     You may have headaches.  Try lying down and putting a cool cloth over your forehead.     You can use acetaminophen (Tylenol) for pain relief.  Don't take any anti-inflammatory medicines (such as Advil, Motrin, Aleve), unless your doctor says it's okay.     You may feel a fullness or aching in your lower belly.  This can feel like the kind of cramps you might get before a period. A back rub may help.     You may need to urinate more.  Your growing uterus and changing hormones can affect your bladder.     You may feel sick to your stomach (morning sickness).  Try avoiding food and smells that make you feel sick.     Your breasts may feel different.  They may feel tender or get bigger. Your nipples may get darker. Try a bra that gives you good support.     Avoid alcohol, tobacco, and drugs (including marijuana).  If you need help quitting, talk to your doctor.     Take a daily prenatal vitamin.  Choose one with folic acid.   Follow-up care is a key part of your treatment and safety. Be sure to make and go to all appointments, and call your doctor if you are having problems. It's also a good idea to know your test results and keep a list of the medicines you take.  Where can you learn more?  Go to https://www.healthwise.net/patiented  Enter E090 in the search box to learn more  "about \"Weeks 10 to 14 of Your Pregnancy: Care Instructions.\"  Current as of: November 9, 2022               Content Version: 13.7    2445-4276 [employer].   Care instructions adapted under license by your healthcare professional. If you have questions about a medical condition or this instruction, always ask your healthcare professional. [employer] disclaims any warranty or liability for your use of this information.                                                       If you have any questions regarding your visit, Please contact your care team.     [employer]: [phone]    To Schedule an Appointment 24/7  Call: [phone] HOU[facility] TELEPHONE NUMBER     Ravi Ashraf MD  Medical Director        Louisa-ROCIO Metz-ROCIO [facility]ters-Surgery Scheduler  [facility]-Surgery Scheduler               Tuesday-[place]  7:30 a.m-4:30 p.m    Thursday-[place]  7:30 a.m-4:30 p.m    Typical Surgery Days: Tuesday or Friday [facility]  [id number] [place] [id number]  PH: [phone]    Imaging Scheduling all locations  PH: [phone]     [facility]  [address] [place] [address]   [place] [address]  PH: [phone]    [facility]  [address] [place] [address]  PH: [phone] Fax      **Surgeries** Our Surgery Schedulers will contact you to schedule. If you do not receive a call within 3 business days, please call [phone].    Urgent Care locations:  [facility] Monday-Friday  10 am - 8 pm  Saturday and Sunday   9 am - 5 pm  Monday-Friday   10 am - 8 pm  Saturday and Sunday   9 am - 5 pm   [phone]   If you need a medication refill, please contact your pharmacy. Please allow 3 business days for your refill to be completed.  As always, Thank you for trusting us with your healthcare needs!    see additional instructions from " your care team below

## 2023-11-05 LAB
ABO/RH(D): NORMAL
ANTIBODY SCREEN: NEGATIVE
SPECIMEN EXPIRATION DATE: NORMAL

## 2023-11-06 ENCOUNTER — PRENATAL OFFICE VISIT (OUTPATIENT)
Dept: OBGYN | Facility: CLINIC | Age: 33
End: 2023-11-06
Payer: COMMERCIAL

## 2023-11-06 ENCOUNTER — ANCILLARY PROCEDURE (OUTPATIENT)
Dept: ULTRASOUND IMAGING | Facility: CLINIC | Age: 33
End: 2023-11-06
Attending: OBSTETRICS & GYNECOLOGY
Payer: COMMERCIAL

## 2023-11-06 VITALS
OXYGEN SATURATION: 100 % | DIASTOLIC BLOOD PRESSURE: 79 MMHG | BODY MASS INDEX: 34.85 KG/M2 | HEIGHT: 62 IN | HEART RATE: 61 BPM | WEIGHT: 189.4 LBS | SYSTOLIC BLOOD PRESSURE: 123 MMHG

## 2023-11-06 DIAGNOSIS — Z34.80 PRENATAL CARE, SUBSEQUENT PREGNANCY, UNSPECIFIED TRIMESTER: ICD-10-CM

## 2023-11-06 DIAGNOSIS — Z34.80 SUPERVISION OF OTHER NORMAL PREGNANCY, ANTEPARTUM: ICD-10-CM

## 2023-11-06 DIAGNOSIS — Z34.00 SUPERVISION OF NORMAL FIRST PREGNANCY, ANTEPARTUM: Primary | ICD-10-CM

## 2023-11-06 LAB
ERYTHROCYTE [DISTWIDTH] IN BLOOD BY AUTOMATED COUNT: 12.8 % (ref 10–15)
HCT VFR BLD AUTO: 38.5 % (ref 35–47)
HGB BLD-MCNC: 12.9 G/DL (ref 11.7–15.7)
MCH RBC QN AUTO: 29.5 PG (ref 26.5–33)
MCHC RBC AUTO-ENTMCNC: 33.5 G/DL (ref 31.5–36.5)
MCV RBC AUTO: 88 FL (ref 78–100)
PLATELET # BLD AUTO: 203 10E3/UL (ref 150–450)
RBC # BLD AUTO: 4.38 10E6/UL (ref 3.8–5.2)
WBC # BLD AUTO: 9.3 10E3/UL (ref 4–11)

## 2023-11-06 PROCEDURE — 86780 TREPONEMA PALLIDUM: CPT | Performed by: OBSTETRICS & GYNECOLOGY

## 2023-11-06 PROCEDURE — 86762 RUBELLA ANTIBODY: CPT | Performed by: OBSTETRICS & GYNECOLOGY

## 2023-11-06 PROCEDURE — 86850 RBC ANTIBODY SCREEN: CPT | Performed by: OBSTETRICS & GYNECOLOGY

## 2023-11-06 PROCEDURE — 87340 HEPATITIS B SURFACE AG IA: CPT | Performed by: OBSTETRICS & GYNECOLOGY

## 2023-11-06 PROCEDURE — 76801 OB US < 14 WKS SINGLE FETUS: CPT | Mod: TC | Performed by: RADIOLOGY

## 2023-11-06 PROCEDURE — 86803 HEPATITIS C AB TEST: CPT | Performed by: OBSTETRICS & GYNECOLOGY

## 2023-11-06 PROCEDURE — 86901 BLOOD TYPING SEROLOGIC RH(D): CPT | Performed by: OBSTETRICS & GYNECOLOGY

## 2023-11-06 PROCEDURE — 87389 HIV-1 AG W/HIV-1&-2 AB AG IA: CPT | Performed by: OBSTETRICS & GYNECOLOGY

## 2023-11-06 PROCEDURE — 85027 COMPLETE CBC AUTOMATED: CPT | Performed by: OBSTETRICS & GYNECOLOGY

## 2023-11-06 PROCEDURE — 36415 COLL VENOUS BLD VENIPUNCTURE: CPT | Performed by: OBSTETRICS & GYNECOLOGY

## 2023-11-06 PROCEDURE — 99213 OFFICE O/P EST LOW 20 MIN: CPT | Performed by: OBSTETRICS & GYNECOLOGY

## 2023-11-06 PROCEDURE — 86900 BLOOD TYPING SEROLOGIC ABO: CPT | Performed by: OBSTETRICS & GYNECOLOGY

## 2023-11-06 RX ORDER — ONDANSETRON 4 MG/1
4 TABLET, ORALLY DISINTEGRATING ORAL
COMMUNITY
Start: 2023-11-04

## 2023-11-06 RX ORDER — CEPHALEXIN 500 MG/1
500 CAPSULE ORAL
COMMUNITY
Start: 2023-11-04 | End: 2023-11-11
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
## 2023-11-07 PROBLEM — Z29.13 NEED FOR RHOGAM DUE TO RH NEGATIVE MOTHER: Status: ACTIVE | Noted: 2023-11-07

## 2023-11-07 LAB
HBV SURFACE AG SERPL QL IA: NONREACTIVE
HCV AB SERPL QL IA: NONREACTIVE
HIV 1+2 AB+HIV1 P24 AG SERPL QL IA: NONREACTIVE
RUBV IGG SERPL QL IA: 2.44 INDEX
RUBV IGG SERPL QL IA: POSITIVE
T PALLIDUM AB SER QL: NONREACTIVE

## 2023-11-30 ENCOUNTER — PRENATAL OFFICE VISIT (OUTPATIENT)
Dept: OBGYN | Facility: CLINIC | Age: 33
End: 2023-11-30
Payer: COMMERCIAL

## 2023-11-30 VITALS
SYSTOLIC BLOOD PRESSURE: 115 MMHG | BODY MASS INDEX: 34.53 KG/M2 | OXYGEN SATURATION: 99 % | DIASTOLIC BLOOD PRESSURE: 77 MMHG | HEART RATE: 65 BPM | WEIGHT: 188.8 LBS

## 2023-11-30 DIAGNOSIS — Z34.80 SUPERVISION OF OTHER NORMAL PREGNANCY, ANTEPARTUM: Primary | ICD-10-CM

## 2023-11-30 PROCEDURE — 99207 PR PRENATAL VISIT: CPT | Performed by: OBSTETRICS & GYNECOLOGY

## 2023-11-30 NOTE — PROGRESS NOTES
Patient presents for routine prenatal visit at 10w4d.  Patient without complaint.   PE: See OB vitals  Body mass index is 34.53 kg/m .    Questions asked and answered.    Plan Ultrasound 18 weeks  Ravi Ashraf MD FACOG

## 2023-11-30 NOTE — PATIENT INSTRUCTIONS
Weeks 10 to 14 of Your Pregnancy: Care Instructions  It's now possible to hear the fetus's heartbeat with a special ultrasound device. And the fetus's organs are developing.    Decide about tests to check for birth defects. Think about your age, your chance of passing on a family disease, your need to know about any problems, and what you might do after you have the test results.   It's okay to exercise. Try activities such as walking or swimming. Check with your doctor before starting a new program.     You may feel more tired than usual.  Taking naps during the day may help.     You may feel emotional.  It might help to talk to someone.     You may have headaches.  Try lying down and putting a cool cloth over your forehead.     You can use acetaminophen (Tylenol) for pain relief.  Don't take any anti-inflammatory medicines (such as Advil, Motrin, Aleve), unless your doctor says it's okay.     You may feel a fullness or aching in your lower belly.  This can feel like the kind of cramps you might get before a period. A back rub may help.     You may need to urinate more.  Your growing uterus and changing hormones can affect your bladder.     You may feel sick to your stomach (morning sickness).  Try avoiding food and smells that make you feel sick.     Your breasts may feel different.  They may feel tender or get bigger. Your nipples may get darker. Try a bra that gives you good support.     Avoid alcohol, tobacco, and drugs (including marijuana).  If you need help quitting, talk to your doctor.     Take a daily prenatal vitamin.  Choose one with folic acid.   Follow-up care is a key part of your treatment and safety. Be sure to make and go to all appointments, and call your doctor if you are having problems. It's also a good idea to know your test results and keep a list of the medicines you take.  Where can you learn more?  Go to https://www.healthwise.net/patiented  Enter E090 in the search box to learn more  "about \"Weeks 10 to 14 of Your Pregnancy: Care Instructions.\"  Current as of: July 11, 2023               Content Version: 13.8    5632-2085 [employer].   Care instructions adapted under license by your healthcare professional. If you have questions about a medical condition or this instruction, always ask your healthcare professional. [employer] disclaims any warranty or liability for your use of this information.                                                       If you have any questions regarding your visit, Please contact your care team.     [employer]: [phone]    To Schedule an Appointment 24/7  Call: [phone] HOU[facility]LEPHONE NUM[facility]     Ravi Ashraf MD  Medical Director        Louisa-ROCIO Metz-ROCIO Walters-Surgery Scheduler  [facility]-Surgery Scheduler               Tuesday-[place]  7:30 a.m-4:30 p.m    Thursday-[place]  7:30 a.m-4:30 p.m    Typical Surgery Days: Tuesday or Friday [facility]  [id number] [place] [id number]  PH: [phone]    Imaging Scheduling all locations  PH: [phone]     [facility] Labor and Delivery  [address] [place] [address]   [place] [address]  PH: [phone]    [facility]  [address] [place] [address]  PH: [phone] Fax      **Surgeries** Our Surgery Schedulers will contact you to schedule. If you do not receive a call within 3 business days, please call [phone].    Urgent Care locations:  [facility] Monday-Friday  10 am - 8 pm  Saturday and Sunday   9 am - 5 pm  Monday-Friday   10 am - 8 pm  Saturday and Sunday   9 am - 5 pm   [phone]   If you need a medication refill, please contact your pharmacy. Please allow 3 business days for your refill to be completed.  As always, Thank you for trusting us with your healthcare needs!    see additional instructions from " your care team below

## 2023-12-18 NOTE — PATIENT INSTRUCTIONS
Weeks 10 to 14 of Your Pregnancy: Care Instructions  It's now possible to hear the fetus's heartbeat with a special ultrasound device. And the fetus's organs are developing.    Decide about tests to check for birth defects. Think about your age, your chance of passing on a family disease, your need to know about any problems, and what you might do after you have the test results.   It's okay to exercise. Try activities such as walking or swimming. Check with your doctor before starting a new program.     You may feel more tired than usual.  Taking naps during the day may help.     You may feel emotional.  It might help to talk to someone.     You may have headaches.  Try lying down and putting a cool cloth over your forehead.     You can use acetaminophen (Tylenol) for pain relief.  Don't take any anti-inflammatory medicines (such as Advil, Motrin, Aleve), unless your doctor says it's okay.     You may feel a fullness or aching in your lower belly.  This can feel like the kind of cramps you might get before a period. A back rub may help.     You may need to urinate more.  Your growing uterus and changing hormones can affect your bladder.     You may feel sick to your stomach (morning sickness).  Try avoiding food and smells that make you feel sick.     Your breasts may feel different.  They may feel tender or get bigger. Your nipples may get darker. Try a bra that gives you good support.     Avoid alcohol, tobacco, and drugs (including marijuana).  If you need help quitting, talk to your doctor.     Take a daily prenatal vitamin.  Choose one with folic acid.   Follow-up care is a key part of your treatment and safety. Be sure to make and go to all appointments, and call your doctor if you are having problems. It's also a good idea to know your test results and keep a list of the medicines you take.  Where can you learn more?  Go to https://www.healthwise.net/patiented  Enter E090 in the search box to learn more  "about \"Weeks 10 to 14 of Your Pregnancy: Care Instructions.\"  Current as of: July 11, 2023               Content Version: 13.8    4414-3719 [employer].   Care instructions adapted under license by your healthcare professional. If you have questions about a medical condition or this instruction, always ask your healthcare professional. [employer] disclaims any warranty or liability for your use of this information.      Understanding Alpha and Beta Thalassemia  What is thalassemia?  Thalassemia [[facility]-francisco-SEE-margo-] is a lifelong blood disorder. It is caused by mutations (changes) in the genes that make hemoglobin.   Hemoglobin is a protein in red blood cells that carries oxygen. Hemoglobin is made of 4 smaller protein chains: 2 blocks of alpha chains and 2 blocks of beta chains (see Figure 1). Each block has heme (iron) in the center. Oxygen sticks to the heme, allowing your body to carry it through the bloodstream. The oxygen is delivered to your whole body.  When you have alpha thalassemia, your alpha blocks are smaller or are missing one or both alpha chains. (See Figure 2 for an example.) For beta thalassemia, the beta blocks are smaller or fewer in number.   With either disorder, your body makes smaller hemoglobin and possibly fewer red blood cells to hold hemoglobin (anemia). You may feel very tired or have other problems as a result.  How do you get thalassemia?  There are 4 genes for alpha thalassemia and 2 genes for beta thalassemia. For you to have either alpha or beta thalassemia, both of your parents must carry a gene mutation for the disease and pass it down to you.   It's possible to have more severe or less severe symptoms than your parents. If you get a mutation from only one parent, for example, you won't have symptoms of thalassemia (thalassemia trait)--but you could still pass the mutation to your children.  Types of thalassemias  Some types of thalassemia have fewer " symptoms than others. How severe the thalassemia is depends on how many mutated genes you have inherited and how many alpha or beta blocks are affected.   Alpha Thalassemia  Silent carrier (1 alpha mutation): People with this type have no symptoms and often do not know they have thalassemia.  Alpha thalassemia trait (2 alpha mutations): Some people with this trait may have mild anemia, but they often feel well.  Hemoglobin H disease (3 alpha mutations): People with this disorder have anemia more often. They sometimes need blood transfusions, but can have a normal life span.  Hemoglobin Barts (4 alpha mutations):  With this type, no alpha blocks are made. Severe problems occur during the mother's pregnancy and newborns rarely survive.  Beta Thalassemia  Beta thalassemia trait (1 beta mutation): People with this trait (also called beta thalassemia minor) have red blood cells that are small. Mild anemia can occur, but it is rare.  Beta thalassemia intermedia (2 beta mutations): In this type, both beta genes are abnormal, but these mutations may be mild. More severe types sometimes need blood transfusions to treat anemia and medicine to treat iron buildup. Patients have normal life spans.  Beta thalassemia major (2 severe beta mutations): This is the most severe form of beta thalassemia. Both beta genes are abnormal, causing little to no beta blocks to be made. Patients often need medicine to reduce iron buildup, as well as monthly blood transfusions to stay healthy. The only cure at this time is a bone marrow transplant. More options are still being studied.  Treatment and outcomes  Mild types: People with a mild type of alpha or beta thalassemia rarely need treatment. Some need folic acid to help make more red blood cells. Most have normal life spans.  Moderate to severe types: Patients with these types have a higher risk for reduced growth, early bone thinning and pregnancy problems.  Most severe types: These  patients often need regular blood transfusions, even if not sick. It is common to have iron build up in your body, so medicine may be needed to reduce the buildup. If left untreated, too much iron, especially in the liver and heart, can lead to premature death.  Outcomes for patients with alpha or beta thalassemia are usually very good in developed countries like the [place]. Survival rates higher than 90% have been reported for children.   For informational purposes only. Not to replace the advice of your health care provider. Copyright   2021 [facility]. All rights reserved. Clinically reviewed by Len Lin MD, Hematology. [employer] 010042 - REV 08/21.    Nutrition During Pregnancy: Care Instructions  Overview     Healthy eating when you are pregnant is important for you and your baby. It can help you feel well and have a successful pregnancy and delivery. During pregnancy your nutrition needs increase. Even if you have excellent eating habits, your doctor may recommend a multivitamin to make sure you get enough iron and folic acid.  You may wonder how much weight you should gain. In general, if you were at a healthy weight before you became pregnant, then you should gain between 25 and 35 pounds. If you were overweight before pregnancy, then you'll likely be advised to gain 15 to 25 pounds. If you were underweight before pregnancy, then you'll probably be advised to gain 28 to 40 pounds. Your doctor will work with you to set a weight goal that is right for you. Gaining a healthy amount of weight helps you have a healthy baby.  Follow-up care is a key part of your treatment and safety. Be sure to make and go to all appointments, and call your doctor if you are having problems. It's also a good idea to know your test results and keep a list of the medicines you take.  How can you care for yourself at home?  Eat plenty of fruits and vegetables. Include a variety of orange, yellow,  and leafy dark-green vegetables every day.  Choose whole-grain bread, cereal, and pasta. Good choices include whole wheat bread, whole wheat pasta, brown rice, and oatmeal.  Get 4 or more servings of milk and milk products each day. Good choices include nonfat or low-fat milk, yogurt, and cheese. If you cannot eat milk products, you can get calcium from calcium-fortified products such as orange juice, soy milk, and tofu. Other non-milk sources of calcium include leafy green vegetables, such as broccoli, kale, mustard greens, turnip greens, bok libra, and brussels sprouts.  If you eat meat, pick lower-fat types. Good choices include lean cuts of meat and chicken or turkey without the skin.  Do not eat shark, swordfish, graciela mackerel, or tilefish. They have high levels of mercury, which is dangerous to your baby. You can eat up to 12 ounces a week of fish or shellfish that have low mercury levels. Good choices include shrimp, wild salmon, pollock, and catfish. Limit some other types of fish, such as white (albacore) tuna, to 4 oz (0.1 kg) a week.  Heat lunch meats (such as turkey, ham, or bologna) to 165 F before you eat them. This reduces your risk of getting sick from a kind of bacteria that can be found in lunch meats.  Do not eat unpasteurized soft cheeses, such as brie, feta, fresh mozzarella, and blue cheese. They have a bacteria that could harm your baby.  Limit caffeine. If you drink coffee or tea, have no more than 1 cup a day. Caffeine is also found in rafa.  Do not drink any alcohol. No amount of alcohol has been found to be safe during pregnancy.  Do not diet or try to lose weight. For example, do not follow a low-carbohydrate diet. If you are overweight at the start of your pregnancy, your doctor will work with you to manage your weight gain.  Tell your doctor about all vitamins and supplements you take.  When should you call for help?  Watch closely for changes in your health, and be sure to contact your  "doctor if you have any problems.  Where can you learn more?  Go to https://www.[employer].net/patiented  Enter Y785 in the search box to learn more about \"Nutrition During Pregnancy: Care Instructions.\"  Current as of: February 28, 2023               Content Version: 13.8    6296-9714 [employer].   Care instructions adapted under license by your healthcare professional. If you have questions about a medical condition or this instruction, always ask your healthcare professional. [employer] disclaims any warranty or liability for your use of this information.      Exercise During Pregnancy: Care Instructions  Overview     Exercise is good for you during a healthy pregnancy. It can relieve back pain, swelling, and other discomforts. It also prepares your muscles for childbirth. And exercise can improve your energy level and help you sleep better.  If your doctor advises it, get more exercise. For example, walking is a good choice. Bit by bit, increase the amount you walk every day. Try for at least 30 minutes on most days of the week. You could also try a prenatal exercise class. But if you do not already exercise, be sure to talk with your doctor before you start a new exercise program. Doctors do not recommend contact sports during pregnancy.  Follow-up care is a key part of your treatment and safety. Be sure to make and go to all appointments, and call your doctor if you are having problems. It's also a good idea to know your test results and keep a list of the medicines you take.  How can you care for yourself at home?  Talk with your doctor about the right kind of exercise for each stage of pregnancy.  Listen to your body to know if your exercise is at a safe level.  Do not become overheated while you exercise. High body temperature can be harmful. Avoid activities that can make your body too hot.  If you feel tired, take it easy. You might walk instead of run.  If you are used to " "strenuous exercise, ask your doctor how to know when it's time to slow down.  If you exercised before getting pregnant, you should be able to keep up your routine early in your pregnancy. Later in your pregnancy, you may want to switch to more gentle activities.  Drink plenty of fluids before, during, and after exercise.  Avoid contact sports, such as soccer and basketball. Also avoid risky activities. These include scuba diving, horseback riding, and exercising at a high altitude (above 6,000 feet). If you live in a place with a high altitude, talk to your doctor about how you can exercise safely.  Do not get overtired while you exercise. You should be able to talk while you work out.  After your fourth month of pregnancy, avoid exercises that require you to lie flat on your back on a hard surface. These include sit-ups and some yoga poses.  Get plenty of rest. You may be very tired while you are pregnant.  Where can you learn more?  Go to https://www.[employer].net/patiented  Enter S801 in the search box to learn more about \"Exercise During Pregnancy: Care Instructions.\"  Current as of: July 11, 2023               Content Version: 13.8    4225-9145 [email or web address].   Care instructions adapted under license by your healthcare professional. If you have questions about a medical condition or this instruction, always ask your healthcare professional. [email or web address] disclaims any warranty or liability for your use of this information.      Learning About Pregnancy and Obesity  How does your weight affect your pregnancy?     The basics of prenatal care are the same for everyone, regardless of size. You'll get what you need to have a healthy baby.  But your size can make a difference in a few things. You and your doctor will have to watch your pregnancy weight. Your weight may affect your labor and delivery.  You may have some extra doctor visits and tests. And you may have some tests earlier in your " "pregnancy. You'll need to pay close attention to things like blood pressure and the chance of getting gestational diabetes. (This is a type of diabetes that sometimes happens during pregnancy.) And close attention will be given to your developing baby.  Work with your doctor to get the care you need. Go to all your doctor visits, and follow your doctor's advice about what to do and what to avoid during pregnancy.  How much weight gain is healthy?  If you are very overweight (obese), experts recommend that you gain between 11 and 20 pounds. Your doctor will work with you to set a weight goal that's right for you. In some cases, your doctor may recommend that you not gain any weight.  How much extra food do you need to eat?  Although you may joke that you're \"eating for two\" during pregnancy, you don't need to eat twice as much food. How much you can eat depends on:  Your height.  How much you weigh when you get pregnant.  How active you are.  If you're carrying more than one fetus (multiple pregnancy).  In the first trimester, you'll probably need the same amount of calories as you did before you were pregnant. In general, in your second trimester, you need to eat about 340 extra calories a day. In your third trimester, you need to eat about 450 extra calories a day.  What can you do to have a healthy pregnancy?  The best things you can do for you and your baby are to eat healthy foods, get regular exercise, avoid alcohol and smoking, and go to your doctor visits.  Eat a variety of foods from all the food groups. Make sure to get enough calcium and folic acid.  You may want to work with a dietitian to help you plan healthy meals to get the right amount of calories for you.  If you didn't exercise much before you got pregnant, talk to your doctor about how you can slowly get more active. Your doctor may want to set up an exercise program with you.  Where can you learn more?  Go to " "https://www.healthC4M.net/patiented  Enter B644 in the search box to learn more about \"Learning About Pregnancy and Obesity.\"  Current as of: July 11, 2023               Content Version: 13.8    0421-5687 [employer].   Care instructions adapted under license by your healthcare professional. If you have questions about a medical condition or this instruction, always ask your healthcare professional. [employer] disclaims any warranty or liability for your use of this information.      You have been provided the CDC Warning Signs in Pregnancy document.    Additional copies can be found here: [email or web address].MeeDoc/571104.pdf                                                       If you have any questions regarding your visit, Please contact your care team.     [employer] Access Services: [phone]  To Schedule an Appointment 24/7  Call: [phone] [facility] HOURS TELEPHONE NUMBER     Arianna Harry FAROOQ CNP      Toby Walters-Surgery Scheduler  [facility]-Surgery Scheduler         Monday 7:30am-2:00pm    Tuesday 7:30am-4:00pm    Wednesday 7:30am-2:00pm    Thursday 7:30am-11:00am    Friday 7:30am-2:00pm [place] [address] [place] [address]  Phone- [phone]   Fax- [phone]     Imaging Scheduling all locations  [phone]    [facility]  [address] [place] [address] [phone]         Urgent Care locations:  [facility]   Monday-Friday  10 am - 8 pm  Saturday and Sunday   9 am - 5 pm     [phone]   If you need a medication refill, please contact your pharmacy. Please allow 3 business days for your refill to be completed.  As always, Thank you for trusting us with your healthcare needs!      see additional instructions from your care team below    "

## 2023-12-18 NOTE — PROGRESS NOTES
Patient presents for routine prenatal visit. Prenatal flowsheet reviewed and updated as needed.  Denies loss of fluid, contractions or cramping. Denies headache, nausea/vomiting, upper abdominal pain, vision changes, lower extremity swelling, chest pain or shortness of breath. No vaginal bleeding the last 2 days.    Anticipatory guidance appropriate for gestational age reviewed.  PE: See OB vitals    Pregnancy complicated by:  Subchorionic hemorrhage noted on ultrasound in ED this past weekend: Had spotting the next day, but nothing further. Discussed the hemorrhage, recommendations.     Routine prenatal care:  Screening ultrasound scheduled.  Discussed genetic screening on return to clinic if desired.    Questions asked and answered. Next OB visit in 4 week(s) with OB Physician.    Arianna FAROOQ CNP

## 2023-12-19 ENCOUNTER — PRENATAL OFFICE VISIT (OUTPATIENT)
Dept: OBGYN | Facility: CLINIC | Age: 33
End: 2023-12-19
Payer: COMMERCIAL

## 2023-12-19 VITALS
DIASTOLIC BLOOD PRESSURE: 70 MMHG | HEIGHT: 62 IN | SYSTOLIC BLOOD PRESSURE: 108 MMHG | WEIGHT: 187 LBS | HEART RATE: 62 BPM | BODY MASS INDEX: 34.41 KG/M2 | OXYGEN SATURATION: 97 %

## 2023-12-19 DIAGNOSIS — O46.8X9 SUBCHORIONIC HEMATOMA, ANTEPARTUM, SINGLE OR UNSPECIFIED FETUS: ICD-10-CM

## 2023-12-19 DIAGNOSIS — Z34.80 SUPERVISION OF OTHER NORMAL PREGNANCY, ANTEPARTUM: Primary | ICD-10-CM

## 2023-12-19 DIAGNOSIS — O41.8X90 SUBCHORIONIC HEMATOMA, ANTEPARTUM, SINGLE OR UNSPECIFIED FETUS: ICD-10-CM

## 2023-12-19 PROCEDURE — 99207 PR PRENATAL VISIT: CPT | Performed by: NURSE PRACTITIONER

## 2023-12-19 ASSESSMENT — PAIN SCALES - GENERAL: PAINLEVEL: MODERATE PAIN (4)

## 2024-01-25 ENCOUNTER — ANCILLARY PROCEDURE (OUTPATIENT)
Dept: ULTRASOUND IMAGING | Facility: CLINIC | Age: 34
End: 2024-01-25
Attending: OBSTETRICS & GYNECOLOGY
Payer: COMMERCIAL

## 2024-01-25 DIAGNOSIS — Z34.80 SUPERVISION OF OTHER NORMAL PREGNANCY, ANTEPARTUM: ICD-10-CM

## 2024-01-25 PROCEDURE — 76805 OB US >/= 14 WKS SNGL FETUS: CPT | Mod: TC | Performed by: RADIOLOGY

## 2024-01-29 ENCOUNTER — PRENATAL OFFICE VISIT (OUTPATIENT)
Dept: OBGYN | Facility: CLINIC | Age: 34
End: 2024-01-29
Payer: COMMERCIAL

## 2024-01-29 VITALS
HEART RATE: 61 BPM | BODY MASS INDEX: 34.93 KG/M2 | DIASTOLIC BLOOD PRESSURE: 69 MMHG | WEIGHT: 189.8 LBS | HEIGHT: 62 IN | OXYGEN SATURATION: 100 % | SYSTOLIC BLOOD PRESSURE: 111 MMHG

## 2024-01-29 DIAGNOSIS — Z34.80 SUPERVISION OF OTHER NORMAL PREGNANCY, ANTEPARTUM: Primary | ICD-10-CM

## 2024-01-29 PROCEDURE — 99207 PR PRENATAL VISIT: CPT | Performed by: NURSE PRACTITIONER

## 2024-01-29 NOTE — PROGRESS NOTES
Patient presents for routine prenatal visit. Prenatal flowsheet reviewed and updated as needed.  Denies vaginal bleeding, loss of fluid, contractions or cramping. Denies headache, nausea/vomiting, upper abdominal pain, vision changes, lower extremity swelling, chest pain or shortness of breath.     Anticipatory guidance appropriate for gestational age reviewed.  PE: See OB vitals    Routine prenatal care:  I discussed with the patient the option of maternal serum screening for chromosomal abnormalities (such as Trisomy 18 and 21) and neural tube defects.  We discussed the screening nature of this test and the potential for false negative and false positive results and the possible need for additional testing including Level 2 ultrasound and amniocentesis. She is given the opportunity to ask questions and have them answered. She declines testing.    Minimal weight gain so far, but EFW 93rd percentile.     Questions asked and answered. Next OB visit in 4 week(s) with OB Physician.    Arianna FAROOQ CNP

## 2024-01-29 NOTE — PATIENT INSTRUCTIONS
"Weeks 18 to 22 of Your Pregnancy: Care Instructions  At this stage you may find that your nausea and fatigue are gone. You may feel better overall and have more energy. But you might now also have some new discomforts, like sleep problems or leg cramps.    You may start to feel your baby move. These movements can feel like butterflies or bubbles.   Babies at this stage can now suck their thumbs.     Get some exercise every day.  And avoid caffeine late in the day.     Take a warm shower or bath before bed.  Try relaxation exercises to calm your mind and body.     Use extra pillows.  They can help you get comfortable.     Don't use sleeping pills or alcohol.  They could harm your baby.     For leg cramps, stretch and apply heat.  A warm bath, leg warmers, a heating pad, or a hot water bottle can help with muscle aches.   Stretches for leg cramps    Straighten your leg and bend your foot (flex your ankle) slowly upward, toward your knee. Bend your toes up and down.   Stand on a flat surface. Stretch your toes upward. For balance, hold on to the wall or something stable. If it feels okay, take small steps walking on your heels.   Follow-up care is a key part of your treatment and safety. Be sure to make and go to all appointments, and call your doctor if you are having problems. It's also a good idea to know your test results and keep a list of the medicines you take.  Where can you learn more?  Go to https://www.Panelfly.net/patiented  Enter W603 in the search box to learn more about \"Weeks 18 to 22 of Your Pregnancy: Care Instructions.\"  Current as of: July 11, 2023               Content Version: 13.8    4614-2714 [employer].   Care instructions adapted under license by your healthcare professional. If you have questions about a medical condition or this instruction, always ask your healthcare professional. [employer] disclaims any warranty or liability for your use of this " information.                                                       If you have any questions regarding your visit, Please contact your care team.     [employer] Access Services: [phone]  To Schedule an Appointment 24/7  Call: [phone] [facility] Center HOURS TELEPHONE NUMBER     Arianna Pierre- APRN CNP      ElviraEmily Walters-Surgery Scheduler  [facility]-Surgery Scheduler         Monday 7:30am-2:00pm    Tuesday 7:30am-4:00pm    Wednesday 7:30am-2:00pm    Thursday 7:30am-11:00am    Friday 7:30am-2:00pm [address] [address]  Phone- [phone]   Fax- [phone]     Imaging Scheduling all locations  [phone]    [facility]  [address] [place] [address]   [place] [address] [phone]         Urgent Care locations:  [facility]   Monday-Friday  10 am - 8 pm  Saturday and Sunday   9 am - 5 pm     [phone]   If you need a medication refill, please contact your pharmacy. Please allow 3 business days for your refill to be completed.  As always, Thank you for trusting us with your healthcare needs!      see additional instructions from your care team below

## 2024-02-28 ENCOUNTER — PRENATAL OFFICE VISIT (OUTPATIENT)
Dept: OBGYN | Facility: CLINIC | Age: 34
End: 2024-02-28
Payer: COMMERCIAL

## 2024-02-28 VITALS
SYSTOLIC BLOOD PRESSURE: 128 MMHG | WEIGHT: 192.2 LBS | BODY MASS INDEX: 35.15 KG/M2 | OXYGEN SATURATION: 98 % | HEART RATE: 89 BPM | DIASTOLIC BLOOD PRESSURE: 81 MMHG

## 2024-02-28 DIAGNOSIS — R10.9 ABDOMINAL PAIN DURING PREGNANCY IN SECOND TRIMESTER: ICD-10-CM

## 2024-02-28 DIAGNOSIS — O26.892 ABDOMINAL PAIN DURING PREGNANCY IN SECOND TRIMESTER: ICD-10-CM

## 2024-02-28 DIAGNOSIS — O09.92 HIGH-RISK PREGNANCY IN SECOND TRIMESTER: Primary | ICD-10-CM

## 2024-02-28 PROCEDURE — 99213 OFFICE O/P EST LOW 20 MIN: CPT | Performed by: OBSTETRICS & GYNECOLOGY

## 2024-02-28 PROCEDURE — 99207 PR COMPLICATED OB VISIT: CPT | Performed by: OBSTETRICS & GYNECOLOGY

## 2024-02-28 NOTE — PROGRESS NOTES
Presents for routine  appointment.    Woke up with right abdominal pain this am.  No f/c, n/v, discharge, dysuria.   Gallbladder surgically absent.  No vaginal bleeding, CTX, loss of fluid.  +FM    /81 (BP Location: Left arm, Patient Position: Chair, Cuff Size: Adult Regular)   Pulse 89   Wt 87.2 kg (192 lb 3.2 oz)   LMP 2023 (Exact Date)   SpO2 98%   BMI 35.15 kg/m      Gen: AAOx3. NAD  Chest: Symmetrical, unlabored breathing  Abd: Soft, Gravid,  +tenderness with deep palpation right mid abdomen -r/r/g      A/P:  33 year old  at 23w3d LORENA    Abdominal pain- ultrasound ordered to evaluate placenta, kidneys and appendix ordered. However, unable to get in with imaging today therefore sending to hospital for further evaluation. All questions answered. Patient in agreement. On- call aware      Pregnancy c/b:  -EFW >95%. Repeat growth at 28wks. Early Gtt recommended.  -Rh neg. Rhogam at 28wks  -Hx postpartum depression  -Hx sleeve gastrectomy. Serial growth ultrasounds recommended  -Hx GDM  -Gilbert's syndrome    Routine Prenatal Care:  -GCT, hgb and anti-treponema testing  after 24 weeks     Follow up in 4 weeks.    Domi Boswell DO

## 2024-02-28 NOTE — PATIENT INSTRUCTIONS
"Weeks 22 to 26 of Your Pregnancy: Care Instructions  Your baby's lungs are getting ready for breathing. Your baby may respond to your voice. Your baby likely turns less, and kicks or jerks more. Jerking may mean that your baby has hiccups.    Think about taking childbirth classes. And start to think about whether you want to have pain medicine during labor.   At your next doctor visit, you may be tested for anemia and for high blood sugar that first occurs during pregnancy (gestational diabetes). These conditions can cause problems for you and your baby.     To ease discomfort, such as back pain    Change your position often. Try not to sit or stand for too long.  Get some exercise. Things like walking or stretching may help.  Try using a heating pad or cold pack.    To ease or reduce swelling in your feet, ankles, hands, and fingers    Take off your rings.  Avoid high-sodium foods, such as potato chips.  Prop up your feet, and sleep with pillows under your feet.  Try to avoid standing for long periods of time.  Do not wear tight shoes.  Wear support stockings.  Kegel exercises to prevent urine from leaking    Squeeze your muscles as if you were trying not to pass gas. Your belly, legs, and buttocks shouldn't move. Hold the squeeze for 3 seconds, then relax for 5 to 10 seconds.    Add 1 second each week until you can squeeze for 10 seconds. Repeat the exercise 10 times a session. Do 3 to 8 sessions a day. If these exercises cause you pain, stop doing them and talk with your doctor.  Follow-up care is a key part of your treatment and safety. Be sure to make and go to all appointments, and call your doctor if you are having problems. It's also a good idea to know your test results and keep a list of the medicines you take.  Where can you learn more?  Go to https://www.healthwise.net/patiented  Enter G264 in the search box to learn more about \"Weeks 22 to 26 of Your Pregnancy: Care Instructions.\"  Current as of: July " 2023               Content Version: 13.8    8215-1847 [employer].   Care instructions adapted under license by your healthcare professional. If you have questions about a medical condition or this instruction, always ask your healthcare professional. [employer] disclaims any warranty or liability for your use of this information.                                                                        If you have any questions regarding your visit, Please contact your care team.    [employer]: [phone]      Women s Health CLINIC HOURS TELEPHONE NUMBER   Domi Boswell DO.    SAJAN Norris-Surgery Scheduler  [place] - Surgery Scheduler    ROCIO Metz RN Kylie, RN     Monday, Thursday  [place]  7am-3pm    Tuesday, Wednesday  [place]  7am-3pm    Friday  [place]  1pm-3:30pm    Typical Surgery Days: Thursday or Friday   [facility]  [address] [place] [address] [phone] Phone  [phone] Fax    [address] [address] [phone] Phone    Imaging [phone] Phone    [facility] Labor and Delivery:  [phone] Phone     **Surgeries** Our Surgery Schedulers will contact you to schedule. If you do not receive a call within 3 business days, please call [phone].    Urgent Care locations:  [facility] Saturday and    9 am - 5 pm    Monday-Friday   12 pm - 8 pm  Saturday and    9 am - 5 pm   [phone]       If you need a medication refill, please contact your pharmacy. Please allow 3 business days for your refill to be completed.  As always, Thank you for trusting us with your healthcare needs!

## 2024-03-14 NOTE — PATIENT INSTRUCTIONS
"Weeks 22 to 26 of Your Pregnancy: Care Instructions  Your baby's lungs are getting ready for breathing. Your baby may respond to your voice. Your baby likely turns less, and kicks or jerks more. Jerking may mean that your baby has hiccups.    Think about taking childbirth classes. And start to think about whether you want to have pain medicine during labor.    At your next doctor visit, you may be tested for anemia and for high blood sugar that first occurs during pregnancy (gestational diabetes). These conditions can cause problems for you and your baby.    To ease discomfort, such as back pain    Change your position often. Try not to sit or stand for too long.  Get some exercise. Things like walking or stretching may help.  Try using a heating pad or cold pack.    To ease or reduce swelling in your feet, ankles, hands, and fingers    Take off your rings.  Avoid high-sodium foods, such as potato chips.  Prop up your feet, and sleep with pillows under your feet.  Try to avoid standing for long periods of time.  Do not wear tight shoes.  Wear support stockings.  Kegel exercises to prevent urine from leaking    Squeeze your muscles as if you were trying not to pass gas. Your belly, legs, and buttocks shouldn't move. Hold the squeeze for 3 seconds, then relax for 5 to 10 seconds.    Add 1 second each week until you can squeeze for 10 seconds. Repeat the exercise 10 times a session. Do 3 to 8 sessions a day. If these exercises cause you pain, stop doing them and talk with your doctor.  Follow-up care is a key part of your treatment and safety. Be sure to make and go to all appointments, and call your doctor if you are having problems. It's also a good idea to know your test results and keep a list of the medicines you take.  Where can you learn more?  Go to https://www.healthwise.net/patiented  Enter G264 in the search box to learn more about \"Weeks 22 to 26 of Your Pregnancy: Care Instructions.\"  Current as of: July " 10, 2023               Content Version: 14.0    4938-5450 [employer].   Care instructions adapted under license by your healthcare professional. If you have questions about a medical condition or this instruction, always ask your healthcare professional. [employer] disclaims any warranty or liability for your use of this information.      Learning About Screening for Gestational Diabetes  What is gestational diabetes screening?     Screening for gestational diabetes is a way to look for high blood sugar during pregnancy. You drink some very sweet liquid. Then you have a blood test to see how your body uses sugar (glucose).  How is gestational diabetes screening done?  Screening for gestational diabetes may be done in a couple of ways.  Two-part screening.  Part one (glucose challenge test): A blood sample is taken after you drink a liquid that contains sugar (glucose). You don't need to stop eating or drinking before this test. If the test shows that you don't have a lot of sugar in your blood, you don't have gestational diabetes.  Part two (oral glucose tolerance test, or OGTT): If the first test shows a lot of sugar in your blood, then you may have an OGTT. You can't eat or drink for at least 8 hours before this test. A blood sample is taken, then you drink a sweet liquid. You have more blood tests after 1 to 3 hours. If the OGTT shows that you have a lot of sugar in your blood, you may have gestational diabetes.  One-part screening.  Sometimes doctors use the OGTT on its own. If the test shows that you don't have a lot of sugar in your blood, you don't have gestational diabetes. If you do have a lot of sugar in your blood, you may have the condition.  What are the risks of screening?  Your blood glucose level may drop very low toward the end of the test. If this happens, you may feel weak, hungry, and restless. Tell your doctor if you have these symptoms. The test usually will be  "stopped.  You may vomit after drinking the sweet liquid. If this happens, you may need to take the test at a later time.  Your doctor may do more glucose tests at other times during your pregnancy.  Follow-up care is a key part of your treatment and safety. Be sure to make and go to all appointments, and call your doctor if you are having problems. It's also a good idea to know your test results and keep a list of the medicines you take.  Where can you learn more?  Go to https://www.[address].net/patiented  Enter A472 in the search box to learn more about \"Learning About Screening for Gestational Diabetes.\"  Current as of: October 2, 2023               Content Version: 14.0    6745-9042 [employer].   Care instructions adapted under license by your healthcare professional. If you have questions about a medical condition or this instruction, always ask your healthcare professional. [employer] disclaims any warranty or liability for your use of this information.      You have been provided the My Labor and Birth Wishes document.  Please review at home and bring to your next prenatal visit. Bring this sheet to the hospital for your birth. Give copies to your care team members and support person.   Additional copies can be found here:  [email or web address]                                                       If you have any questions regarding your visit, Please contact your care team.     [employer] Access Services: [phone]    To Schedule an Appointment 24/7  Call: [phone] TELE[facility]     Ravi Ashraf MD  Medical Director        Sin Metz-ROCIO Walters-Surgery Scheduler  [place]-Surgery Scheduler               Tuesday-[place]  7:30 a.m-4:30 p.m    Thursday-[place]  7:30 a.m-4:30 p.m    Typical Surgery Days: Tuesday or Friday [facility] [place]  [address] [place] [address]  PH: [phone]    Imaging " Scheduling all locations  PH: [phone]     [facility] Labor and Delivery  [address] [place] [address]  PH: [phone]    [facility]  [address] [place]  [place] [address]  PH: [phone] Fax      **Surgeries** Our Surgery Schedulers will contact you to schedule. If you do not receive a call within 3 business days, please call [phone].    Urgent Care locations:  [facility] Monday-Friday  10 am - 8 pm  Saturday and Sunday   9 am - 5 pm  Monday-Friday   10 am - 8 pm  Saturday and Sunday   9 am - 5 pm   [phone]   If you need a medication refill, please contact your pharmacy. Please allow 3 business days for your refill to be completed.  As always, Thank you for trusting us with your healthcare needs!    see additional instructions from your care team below

## 2024-03-17 DIAGNOSIS — O23.599 BACTERIAL VAGINOSIS IN PREGNANCY: Primary | ICD-10-CM

## 2024-03-17 DIAGNOSIS — B96.89 BACTERIAL VAGINOSIS IN PREGNANCY: Primary | ICD-10-CM

## 2024-03-17 RX ORDER — METRONIDAZOLE 500 MG/1
500 TABLET ORAL 2 TIMES DAILY
Qty: 14 TABLET | Refills: 0 | Status: SHIPPED | OUTPATIENT
Start: 2024-03-17 | End: 2024-03-24

## 2024-03-17 NOTE — PROGRESS NOTES
Patient was seen in triage and dx with bacterial vaginosis.  Prescription sent in for her.    Nya Zheng DO

## 2024-03-18 ENCOUNTER — PRENATAL OFFICE VISIT (OUTPATIENT)
Dept: OBGYN | Facility: CLINIC | Age: 34
End: 2024-03-18
Payer: COMMERCIAL

## 2024-03-18 VITALS
WEIGHT: 194 LBS | HEART RATE: 80 BPM | OXYGEN SATURATION: 99 % | BODY MASS INDEX: 35.48 KG/M2 | SYSTOLIC BLOOD PRESSURE: 115 MMHG | DIASTOLIC BLOOD PRESSURE: 73 MMHG

## 2024-03-18 DIAGNOSIS — Z29.13 NEED FOR RHOGAM DUE TO RH NEGATIVE MOTHER: ICD-10-CM

## 2024-03-18 DIAGNOSIS — Z34.80 SUPERVISION OF OTHER NORMAL PREGNANCY, ANTEPARTUM: ICD-10-CM

## 2024-03-18 DIAGNOSIS — O99.019 MATERNAL IRON DEFICIENCY ANEMIA AFFECTING PREGNANCY, ANTEPARTUM: ICD-10-CM

## 2024-03-18 DIAGNOSIS — M86.9 OSTEITIS PUBIS (H): ICD-10-CM

## 2024-03-18 DIAGNOSIS — O09.92 HIGH-RISK PREGNANCY IN SECOND TRIMESTER: Primary | ICD-10-CM

## 2024-03-18 DIAGNOSIS — D50.9 MATERNAL IRON DEFICIENCY ANEMIA AFFECTING PREGNANCY, ANTEPARTUM: ICD-10-CM

## 2024-03-18 DIAGNOSIS — O24.410 DIET CONTROLLED GESTATIONAL DIABETES MELLITUS (GDM), ANTEPARTUM: ICD-10-CM

## 2024-03-18 LAB
ANTIBODY SCREEN: NEGATIVE
GLUCOSE 1H P 50 G GLC PO SERPL-MCNC: 191 MG/DL (ref 70–129)
HGB BLD-MCNC: 10.3 G/DL (ref 11.7–15.7)
SPECIMEN EXPIRATION DATE: NORMAL

## 2024-03-18 PROCEDURE — 82950 GLUCOSE TEST: CPT | Performed by: OBSTETRICS & GYNECOLOGY

## 2024-03-18 PROCEDURE — 86780 TREPONEMA PALLIDUM: CPT | Performed by: OBSTETRICS & GYNECOLOGY

## 2024-03-18 PROCEDURE — 99207 PR PRENATAL VISIT: CPT | Performed by: OBSTETRICS & GYNECOLOGY

## 2024-03-18 PROCEDURE — 36415 COLL VENOUS BLD VENIPUNCTURE: CPT | Performed by: OBSTETRICS & GYNECOLOGY

## 2024-03-18 PROCEDURE — 86850 RBC ANTIBODY SCREEN: CPT | Performed by: OBSTETRICS & GYNECOLOGY

## 2024-03-18 RX ORDER — FERROUS SULFATE 325(65) MG
325 TABLET, DELAYED RELEASE (ENTERIC COATED) ORAL DAILY
Qty: 30 TABLET | Refills: 3 | Status: SHIPPED | OUTPATIENT
Start: 2024-03-18

## 2024-03-18 NOTE — PROGRESS NOTES
Patient presents for routine prenatal visit at 26w1d.  Patient with complaint. Seen in follow up from the hospital this weekend.  Complaining of lower abdominal pain radiating to the pubic symphysis.  Work up in LABOR AND DELIVER was negative except for BV.  Symptoms are still present but some better, no new symptoms.  + fetal movement, no contraction  PE: See OB vitals  Body mass index is 35.48 kg/m .    No vaginal bleeding, loss of fluid, or contractions  1 hour GTT done today. Also hgb and ABS  Will need Rhogam on RETURN TO CLINIC   Finish abyx for BV, heat or ice to pubic symphysis as needed  Questions asked and answered.      Ravi Ashraf MD FACOG

## 2024-03-19 LAB — T PALLIDUM AB SER QL: NONREACTIVE

## 2024-03-21 ENCOUNTER — VIRTUAL VISIT (OUTPATIENT)
Dept: EDUCATION SERVICES | Facility: CLINIC | Age: 34
End: 2024-03-21
Attending: OBSTETRICS & GYNECOLOGY
Payer: COMMERCIAL

## 2024-03-21 DIAGNOSIS — O24.410 DIET CONTROLLED GESTATIONAL DIABETES MELLITUS (GDM), ANTEPARTUM: ICD-10-CM

## 2024-03-21 PROCEDURE — G0109 DIAB MANAGE TRN IND/GROUP: HCPCS | Mod: 95

## 2024-03-21 RX ORDER — LANCETS
EACH MISCELLANEOUS
Qty: 100 EACH | Refills: 6 | Status: SHIPPED | OUTPATIENT
Start: 2024-03-21

## 2024-03-21 NOTE — PROGRESS NOTES
Diabetes Self-Management Education & Support  Type of service:  Video Visit    If the video visit is dropped, the video visit invitation should be resent by: Text to cell phone: [phone]    Originating Location (pt. Location): Home  Distant Location (provider location): [facility]  Mode of Communication:  Video Conference via [employer]    Video Start Time:  8:00 AM  Video End Time (time video stopped): 8:43 AM    How would patient like to obtain AVS? MyChart    SUBJECTIVE/OBJECTIVE:  Presents for education related to gestational diabetes.         Cultural Influences/Ethnic Background:  Not  or       Estimated Date of Delivery: Jun 23, 2024  OB-[place]  #3  Yes previous GDM, almost 7 years ago    1 hour OGTT  Lab Results   Component Value Date    GLU1 191 (H) 03/18/2024         Lifestyle and Health Behaviors:       Healthy Coping:       Current Management:       ASSESSMENT:  Met with Gloria via video today for her initial education for Gestational Diabetes.  This is her third pregnancy and stated she has had GDM before, almost seven years ago.    INTERVENTION:      Educational topics covered today:  GDM diagnosis, pathophysiology, Risks and Complications of GDM, Means of controlling GDM, Using a Blood Glucose Monitor, Blood Glucose Goals, Logging and Interpreting Glucose Results, Ketone Testing, When to Call a Diabetes Educator or OB Provider, Healthy Eating During Pregnancy, Counting Carbohydrates, Meal Planning for GDM, and Physical Activity    Educational materials provided today:   [place] Understanding Gestational Diabetes  GDM Log Book  Sharps Disposal  Care After Delivery  generic meter kit    Pt verbalized understanding of concepts discussed and recommendations provided today.     PLAN:  Check glucose 4 times daily, before breakfast and 1 hour after each meal.     Check Ketones daily for one week, if negative, reduce testing to once a week.     Physical  activity recommended: Yes.    Meal plan: 15-30 carbs at breakfast, 45-60 carbs at lunch, 45-60 carbs at supper, 15-30 carbs at 3 snacks a day.  Follow consistent CHO meal plan, eat CHO and protein/fat at all meals/snacks.    Call/e-mail/MyChart message diabetes educator if 3 or more blood sugars are above the goal in 1 week, if ketones are positive, or with questions/concerns.     The service provided today was under the supervising provider, Bette Moise, who was available if needed.     Time Spent: 43 minutes  Encounter Type: Group class    Any diabetes medication dose changes were made via the CDE Protocol and Collaborative Practice Agreement with the patient's referring provider. A copy of this encounter was shared with the provider.

## 2024-03-21 NOTE — LETTER
3/21/2024         RE: Gloria Sun  [address] [place] [facility] [address]        Dear Colleague,    Thank you for referring your patient, Gloria Sun, to the [facility]. Please see a copy of my visit note below.    Diabetes Self-Management Education & Support  Type of service:  Video Visit    If the video visit is dropped, the video visit invitation should be resent by: Text to cell phone: [phone]    Originating Location (pt. Location): Home  Distant Location (provider location): [facility]  Mode of Communication:  Video Conference via [employer]    Video Start Time:  8:00 AM  Video End Time (time video stopped): 8:43 AM    How would patient like to obtain AVS? MyChart    SUBJECTIVE/OBJECTIVE:  Presents for education related to gestational diabetes.         Cultural Influences/Ethnic Background:  Not  or       Estimated Date of Delivery: Jun 23, 2024  OB-[place]  #3  Yes previous GDM, almost 7 years ago    1 hour OGTT  Lab Results   Component Value Date    GLU1 191 (H) 03/18/2024         Lifestyle and Health Behaviors:       Healthy Coping:       Current Management:       ASSESSMENT:  Met with Gloria via video today for her initial education for Gestational Diabetes.  This is her third pregnancy and stated she has had GDM before, almost seven years ago.    INTERVENTION:      Educational topics covered today:  GDM diagnosis, pathophysiology, Risks and Complications of GDM, Means of controlling GDM, Using a Blood Glucose Monitor, Blood Glucose Goals, Logging and Interpreting Glucose Results, Ketone Testing, When to Call a Diabetes Educator or OB Provider, Healthy Eating During Pregnancy, Counting Carbohydrates, Meal Planning for GDM, and Physical Activity    Educational materials provided today:   [place] Understanding Gestational Diabetes  GDM Log Book  Sharps Disposal  Care After Delivery  generic meter kit    Pt  verbalized understanding of concepts discussed and recommendations provided today.     PLAN:  Check glucose 4 times daily, before breakfast and 1 hour after each meal.     Check Ketones daily for one week, if negative, reduce testing to once a week.     Physical activity recommended: Yes.    Meal plan: 15-30 carbs at breakfast, 45-60 carbs at lunch, 45-60 carbs at supper, 15-30 carbs at 3 snacks a day.  Follow consistent CHO meal plan, eat CHO and protein/fat at all meals/snacks.    Call/e-mail/[employer] message diabetes educator if 3 or more blood sugars are above the goal in 1 week, if ketones are positive, or with questions/concerns.     The service provided today was under the supervising provider, Bette Moise, who was available if needed.     Time Spent: 43 minutes  Encounter Type: Group class    Any diabetes medication dose changes were made via the CDE Protocol and Collaborative Practice Agreement with the patient's referring provider. A copy of this encounter was shared with the provider.

## 2024-04-02 ENCOUNTER — ANCILLARY ORDERS (OUTPATIENT)
Dept: OBGYN | Facility: CLINIC | Age: 34
End: 2024-04-02

## 2024-04-02 ENCOUNTER — PRENATAL OFFICE VISIT (OUTPATIENT)
Dept: OBGYN | Facility: CLINIC | Age: 34
End: 2024-04-02
Payer: COMMERCIAL

## 2024-04-02 ENCOUNTER — ANCILLARY PROCEDURE (OUTPATIENT)
Dept: ULTRASOUND IMAGING | Facility: CLINIC | Age: 34
End: 2024-04-02
Attending: OBSTETRICS & GYNECOLOGY
Payer: COMMERCIAL

## 2024-04-02 VITALS
HEART RATE: 90 BPM | HEIGHT: 62 IN | BODY MASS INDEX: 36.58 KG/M2 | SYSTOLIC BLOOD PRESSURE: 108 MMHG | DIASTOLIC BLOOD PRESSURE: 71 MMHG | OXYGEN SATURATION: 97 % | WEIGHT: 198.8 LBS

## 2024-04-02 DIAGNOSIS — D50.9 MATERNAL IRON DEFICIENCY ANEMIA AFFECTING PREGNANCY, ANTEPARTUM: ICD-10-CM

## 2024-04-02 DIAGNOSIS — Z23 NEED FOR TDAP VACCINATION: ICD-10-CM

## 2024-04-02 DIAGNOSIS — O24.410 DIET CONTROLLED GESTATIONAL DIABETES MELLITUS (GDM), ANTEPARTUM: ICD-10-CM

## 2024-04-02 DIAGNOSIS — O99.019 MATERNAL IRON DEFICIENCY ANEMIA AFFECTING PREGNANCY, ANTEPARTUM: ICD-10-CM

## 2024-04-02 DIAGNOSIS — R10.9 ABDOMINAL PAIN DURING PREGNANCY IN SECOND TRIMESTER: ICD-10-CM

## 2024-04-02 DIAGNOSIS — O09.92 HIGH-RISK PREGNANCY IN SECOND TRIMESTER: Primary | ICD-10-CM

## 2024-04-02 DIAGNOSIS — O09.93 HIGH-RISK PREGNANCY IN THIRD TRIMESTER: Primary | ICD-10-CM

## 2024-04-02 DIAGNOSIS — M86.9 OSTEITIS PUBIS (H): ICD-10-CM

## 2024-04-02 DIAGNOSIS — O26.892 ABDOMINAL PAIN DURING PREGNANCY IN SECOND TRIMESTER: ICD-10-CM

## 2024-04-02 DIAGNOSIS — Z29.13 NEED FOR RHOGAM DUE TO RH NEGATIVE MOTHER: ICD-10-CM

## 2024-04-02 PROCEDURE — 99207 PR PRENATAL VISIT: CPT | Performed by: NURSE PRACTITIONER

## 2024-04-02 PROCEDURE — 90715 TDAP VACCINE 7 YRS/> IM: CPT | Performed by: NURSE PRACTITIONER

## 2024-04-02 PROCEDURE — 96372 THER/PROPH/DIAG INJ SC/IM: CPT | Performed by: NURSE PRACTITIONER

## 2024-04-02 PROCEDURE — 90471 IMMUNIZATION ADMIN: CPT | Performed by: NURSE PRACTITIONER

## 2024-04-02 PROCEDURE — 76816 OB US FOLLOW-UP PER FETUS: CPT | Mod: TC | Performed by: INTERNAL MEDICINE

## 2024-04-02 ASSESSMENT — PATIENT HEALTH QUESTIONNAIRE - PHQ9
SUM OF ALL RESPONSES TO PHQ QUESTIONS 1-9: 4
SUM OF ALL RESPONSES TO PHQ QUESTIONS 1-9: 4
10. IF YOU CHECKED OFF ANY PROBLEMS, HOW DIFFICULT HAVE THESE PROBLEMS MADE IT FOR YOU TO DO YOUR WORK, TAKE CARE OF THINGS AT HOME, OR GET ALONG WITH OTHER PEOPLE: NOT DIFFICULT AT ALL

## 2024-04-02 NOTE — PROGRESS NOTES
Patient presents for routine prenatal visit. Prenatal flowsheet reviewed and updated as needed.  Denies vaginal bleeding, loss of fluid, contractions or cramping. Denies headache, nausea/vomiting, upper abdominal pain, vision changes, lower extremity swelling, chest pain or shortness of breath. Having some sciatic pain and we discussed relief measures to try, notify me if physical therapy referral desired.    Anticipatory guidance appropriate for gestational age reviewed.  PE: See OB vitals    Pregnancy complicated by:  EFW >95th percentile-had growth ultrasound prior to visit-result pending.   Hx: Gastric sleeve-planning serial growth ultrasound.  GDM-blood sugars normal at this time, follow up later this week with Diab Ed.  Gilbert's Syndrome.  Anemia-started a dissolving iron supplement with Vit C about 1 week ago, discussed when to recheck HGB.    Routine prenatal care:  Tdap and Rhogam given today.    Questions asked and answered. Next OB visit in 2 week(s) with OB Physician.    Arianna FAROOQ CNP    Answers submitted by the patient for this visit:  Patient Health Questionnaire (Submitted on 4/2/2024)  If you checked off any problems, how difficult have these problems made it for you to do your work, take care of things at home, or get along with other people?: Not difficult at all  PHQ9 TOTAL SCORE: 4

## 2024-04-02 NOTE — PATIENT INSTRUCTIONS
Weeks 26 to 30 of Your Pregnancy: Care Instructions  You're starting your last trimester. You'll probably feel your baby moving around more. Your back may ache as your body gets used to your baby's size and length. Take care of yourself, and pay attention to what your body needs.    Talk to your doctor about getting the Tdap shot. It will help protect your  against whooping cough (pertussis). Also ask your doctor about flu and COVID-19 shots if you haven't had them yet. If your blood type is Rh negative, you may be given a shot of Rh immune globulin (such as RhoGAM). It can help prevent problems for your baby.    You may have Teddy-Lance contractions. They are single or several strong contractions without a pattern. These are practice contractions but not the start of labor.  Be kind to yourself.     Take breaks when you're tired.  Change positions often. Don't sit for too long or stand for too long.  At work, rest during breaks if you can. If you don't get breaks, talk to your doctor about writing a letter to your employer to request them.  Avoid fumes, chemicals, and tobacco smoke.  Be sexual if you want to.     You may be interested in sex, or you may not. Everyone is different.  Sex is okay unless your doctor tells you not to.  Your belly can make it hard to find good positions for sex. [place] and explore.  Watch for signs of  labor.    These signs include:   Menstrual-like cramps. Or you may have pain or pressure in your pelvis that happens in a pattern.  About 6 or more contractions in an hour (even after rest and a glass of water).  A low, dull backache that doesn't go away when you change positions.  An increase or change in vaginal discharge.  Light vaginal bleeding or spotting.  Your water breaking.  Know what to do if you think you are having contractions.     Drink 1 or 2 glasses of water.  Lie down on your left side for at least an hour.  While on your side, feel the top of your  "belly to see if it's tight.  Write down your contractions for an hour. Time how long it is from the start of one contraction to the start of the next.  Call your doctor if you have regular contractions.  Follow-up care is a key part of your treatment and safety. Be sure to make and go to all appointments, and call your doctor if you are having problems. It's also a good idea to know your test results and keep a list of the medicines you take.  Where can you learn more?  Go to https://www.[employer]ford [employer].net/patiented  Enter S999 in the search box to learn more about \"Weeks 26 to 30 of Your Pregnancy: Care Instructions.\"  Current as of: July 10, 2023               Content Version: 14.0    3433-0094 [email or web address].   Care instructions adapted under license by your healthcare professional. If you have questions about a medical condition or this instruction, always ask your healthcare professional. [email or web address] disclaims any warranty or liability for your use of this information.      Counting Your Baby's Kicks: Care Instructions  Overview     Counting your baby's kicks is one way your doctor can tell that your baby is healthy. You will probably feel your baby move for the first time between 16 and 22 weeks. The movement may feel like flutters rather than kicks. Your baby may move more at certain times of the day. When you are active, you may notice less kicking than when you are resting. At your prenatal visits, your doctor will ask whether the baby is active.  In your last trimester, your doctor may ask you to count the number of times you feel your baby move.  Follow-up care is a key part of your treatment and safety. Be sure to make and go to all appointments, and call your doctor if you are having problems. It's also a good idea to know your test results and keep a list of the medicines you take.  How do you count fetal kicks?  A common method of checking your baby's movement is to note the length " "of time it takes to count 10 movements (such as kicks, flutters, or rolls).  Pick your baby's most active time of day to count. This may be any time from morning to evening.  If you don't feel 10 movements in an hour, have something to eat or drink and count for another hour. If you don't feel at least 10 movements in the 2-hour period, call your doctor.  Do not use an at-home Doppler heart monitor in place of counting fetal movements.  When should you call for help?   Call your doctor now or seek immediate medical care if:    You feel fewer than 10 movements in a 2-hour period.     You noticed that your baby has stopped moving or is moving less than normal.   Watch closely for changes in your health, and be sure to contact your doctor if you have any problems.  Where can you learn more?  Go to https://www.Common Curriculum.net/patiented  Enter U048 in the search box to learn more about \"Counting Your Baby's Kicks: Care Instructions.\"  Current as of: July 10, 2023               Content Version: 14.0    5008-3503 [employer]   Care instructions adapted under license by your healthcare professional. If you have questions about a medical condition or this instruction, always ask your healthcare professional. E.[employer].A.[employer]. disclaims any warranty or liability for your use of this information.                                                         If you have any questions regarding your visit, Please contact your care team.     [employer] Access Services: [phone]  To Schedule an Appointment 24/7  Call: [phone][facility] HOURS TELEPHONE NUMBER     Arianna Walters-Surgery Scheduler  [facility]-Surgery Scheduler         Monday 7:30am-2:00pm    Tuesday 7:30am-4:00pm    Wednesday 7:30am-2:00pm    Thursday 7:30am-11:00am    Friday 7:30am-2:00pm [facility]  [address] [place] " [address]  Phone- [phone]   Fax- [phone]     Imaging Scheduling all locations  [phone]    [facility] Labor and Delivery  [address] [place] [address] [place] [address] [phone]         Urgent Care locations:  [facility]   Monday-Friday  10 am - 8 pm  Saturday and Sunday   9 am - 5 pm     [phone]   If you need a medication refill, please contact your pharmacy. Please allow 3 business days for your refill to be completed.  As always, Thank you for trusting us with your healthcare needs!      see additional instructions from your care team below

## 2024-04-02 NOTE — PROGRESS NOTES
Prior to immunization administration, verified patients identity using patient s name and date of birth. Please see Immunization Activity for additional information.     Screening Questionnaire for Adult Immunization    Are you sick today?   No   Do you have allergies to medications, food, a vaccine component or latex?   Yes   Have you ever had a serious reaction after receiving a vaccination?   No   Do you have a long-term health problem with heart, lung, kidney, or metabolic disease (e.g., diabetes), asthma, a blood disorder, no spleen, complement component deficiency, a cochlear implant, or a spinal fluid leak?  Are you on long-term aspirin therapy?   Yes   Do you have cancer, leukemia, HIV/AIDS, or any other immune system problem?   No   Do you have a parent, brother, or sister with an immune system problem?   No   In the past 3 months, have you taken medications that affect  your immune system, such as prednisone, other steroids, or anticancer drugs; drugs for the treatment of rheumatoid arthritis, Crohn s disease, or psoriasis; or have you had radiation treatments?   No   Have you had a seizure, or a brain or other nervous system problem?   No   During the past year, have you received a transfusion of blood or blood    products, or been given immune (gamma) globulin or antiviral drug?   No   For women: Are you pregnant or is there a chance you could become       pregnant during the next month?   Yes   Have you received any vaccinations in the past 4 weeks?   No     Immunization questionnaire was positive for at least one answer.  Notified Arianna FAROOQ CNP.      Patient instructed to remain in clinic for 15 minutes afterwards, and to report any adverse reactions.     Screening performed by Elvira Adhikari CMA on 4/2/2024 at 11:48 AM.

## 2024-04-02 NOTE — PROGRESS NOTES
Clinic Administered Medication Documentation        Patient was given Rhogam. Prior to medication administration, verified patient's identity using patient s name and date of birth. Please see MAR and medication order for additional information. Patient instructed to remain in clinic for 15 minutes and report any adverse reaction to staff immediately.    Vial/Syringe: Syringe

## 2024-04-08 NOTE — PATIENT INSTRUCTIONS
"Weeks 30 to 32 of Your Pregnancy: Care Instructions  Your baby is growing more every day. Its eyes can open and close, and it may have hair on its head. Your baby may sleep 20 to 45 minutes at a time and is more active at certain times.    You should feel your baby move several times every day. Your baby now turns less and kicks more.    This is a good time to tour your hospital or birthing center. You may also want to find childcare if needed.    To ease heartburn    Avoid foods that make your symptoms worse, such as chocolate, spicy foods, and caffeine.  Avoid bending over or lying down after meals.  Do not eat for 2 hours before bedtime.  Take antacids like Tums, but don't take ones that have sodium bicarbonate, magnesium trisilicate, or aspirin.    To care for large, swollen veins (varicose veins)    Try to avoid standing for long periods of time.  Sit with your feet propped up.  Wear support hose.  Get some exercise every day, like walking or swimming.  Counting your baby's kicks  Your doctor may ask you to count your baby's movements, such as kicks, flutters, or rolls.    Find a quiet place, and get comfortable. Write down your start time. Count your baby's movements (except hiccups). When your baby has moved 10 times, you can stop counting. Write down how many minutes it took.    If an hour goes by and you don't feel 10 movements, have something to eat or drink. Count for another hour. If you don't feel at least 10 movements in the 2-hour period, call your doctor.  Follow-up care is a key part of your treatment and safety. Be sure to make and go to all appointments, and call your doctor if you are having problems. It's also a good idea to know your test results and keep a list of the medicines you take.  Where can you learn more?  Go to https://www.Etherioswise.net/patiented  Enter X471 in the search box to learn more about \"Weeks 30 to 32 of Your Pregnancy: Care Instructions.\"  Current as of: July 10, 2023     " "          Content Version: 14.0    6788-9749 [employer].   Care instructions adapted under license by your healthcare professional. If you have questions about a medical condition or this instruction, always ask your healthcare professional. [employer] disclaims any warranty or liability for your use of this information.      Learning About Birth Control After Childbirth  What is birth control?  Birth control is any method used to prevent pregnancy. If you have vaginal sex without birth control, you could get pregnant--even if you haven't started having periods again. You're less likely to get pregnant while breastfeeding, but it's still possible. Finding birth control that works for you can help avoid an unplanned pregnancy.  There are many kinds of birth control. Each has pros and cons. Find what works for you. Talk to your doctor if you've just given birth or are breastfeeding.    Long-acting reversible contraception (LARC). These are placed inside your body by a doctor. They can prevent pregnancy for years.  Examples include:  An implant (hormonal).  Copper intrauterine device (IUD).  Hormonal IUDs.    Short-acting hormonal methods. These release hormones. Examples include:  Combination birth control pills (\"the pill\").  Skin patches.  A vaginal ring.  A shot.  Mini-pills. Choose progestin-only options soon after giving birth.    Barrier methods. Use these every time you have vaginal sex.  Examples include:  External (male) condoms.  Internal (female) condoms.  Diaphragms.  Cervical caps.  Sponges.    Spermicides. These kill sperm or stop sperm from moving. They can be gels, creams, foams, films, or tablets. Use them before vaginal sex.  Examples include:  Nonoxynol-9.  pH regulator gel.    Permanent birth control (sterilization). This can be an option if you're sure that you don't want to get pregnant later.  Examples include:  Vasectomy.  Having tubes tied (tubal " "ligation).    Emergency contraception. This is a backup method. Use it if you didn't use birth control or your birth control method failed.  Examples include:  Copper and hormonal IUDs.  Emergency contraceptive pills.    Fertility awareness. You'll learn when you are most likely to become pregnant (are fertile). You can avoid vaginal sex at that time.  It's also called:  Natural family planning.  The rhythm method.    Breastfeeding. This is most effective when all of these are true:  Your baby is younger than 6 months old.  You're breastfeeding and not bottle-feeding at all.  You aren't having periods.  Follow-up care is a key part of your treatment and safety. Be sure to make and go to all appointments, and call your doctor if you are having problems. It's also a good idea to know your test results and keep a list of the medicines you take.  Where can you learn more?  Go to https://www.Innovative Biosensors.net/patiented  Enter X408 in the search box to learn more about \"Learning About Birth Control After Childbirth.\"  Current as of: July 10, 2023               Content Version: 14.0    6183-1136 [employer].   Care instructions adapted under license by your healthcare professional. If you have questions about a medical condition or this instruction, always ask your healthcare professional. [employer] disclaims any warranty or liability for your use of this information.                                                         If you have any questions regarding your visit, Please contact your care team.     [employer] Access Services: [phone]    To Schedule an Appointment 24/7  Call: [phone] [facility] HOURS TELEPHONE NUMBER     Ravi Ashraf MD  Medical Director        Sin Metz-ROCIO Walters-Surgery Scheduler  [facility]-Surgery Scheduler               Tuesday-[place]  7:30 a.m-4:30 p.m    Thursday-[place]  7:30 a.m-4:30 p.m    Typical Surgery Days: Tuesday or " Friday [facility]  [address] [place] [address]  PH: [phone]    Imaging Scheduling all locations  PH: [phone]     [facility] Labor and Delivery  [address] [place] [address]  PH: [phone]    [facility]  [address] [place] [address]  PH: [phone] Fax      **Surgeries** Our Surgery Schedulers will contact you to schedule. If you do not receive a call within 3 business days, please call [phone].    Urgent Care locations:  [facility] Monday-Friday  10 am - 8 pm  Saturday and Sunday   9 am - 5 pm  Monday-Friday   10 am - 8 pm  Saturday and Sunday   9 am - 5 pm   [phone]   If you need a medication refill, please contact your pharmacy. Please allow 3 business days for your refill to be completed.  As always, Thank you for trusting us with your healthcare needs!    see additional instructions from your care team below

## 2024-04-15 ENCOUNTER — PRENATAL OFFICE VISIT (OUTPATIENT)
Dept: OBGYN | Facility: CLINIC | Age: 34
End: 2024-04-15
Payer: COMMERCIAL

## 2024-04-15 VITALS
HEART RATE: 85 BPM | SYSTOLIC BLOOD PRESSURE: 107 MMHG | OXYGEN SATURATION: 98 % | WEIGHT: 199.2 LBS | DIASTOLIC BLOOD PRESSURE: 74 MMHG | BODY MASS INDEX: 36.43 KG/M2

## 2024-04-15 DIAGNOSIS — O24.410 DIET CONTROLLED GESTATIONAL DIABETES MELLITUS (GDM), ANTEPARTUM: ICD-10-CM

## 2024-04-15 DIAGNOSIS — O99.019 MATERNAL IRON DEFICIENCY ANEMIA AFFECTING PREGNANCY, ANTEPARTUM: ICD-10-CM

## 2024-04-15 DIAGNOSIS — Z34.80 SUPERVISION OF OTHER NORMAL PREGNANCY, ANTEPARTUM: Primary | ICD-10-CM

## 2024-04-15 DIAGNOSIS — D50.9 MATERNAL IRON DEFICIENCY ANEMIA AFFECTING PREGNANCY, ANTEPARTUM: ICD-10-CM

## 2024-04-15 LAB
ALBUMIN UR-MCNC: NEGATIVE MG/DL
APPEARANCE UR: ABNORMAL
BILIRUB UR QL STRIP: NEGATIVE
COLOR UR AUTO: YELLOW
GLUCOSE UR STRIP-MCNC: NEGATIVE MG/DL
HGB BLD-MCNC: 10 G/DL (ref 11.7–15.7)
HGB UR QL STRIP: NEGATIVE
KETONES UR STRIP-MCNC: NEGATIVE MG/DL
LEUKOCYTE ESTERASE UR QL STRIP: ABNORMAL
NITRATE UR QL: NEGATIVE
PH UR STRIP: 7.5 [PH] (ref 5–7)
RBC #/AREA URNS AUTO: ABNORMAL /HPF
SP GR UR STRIP: 1.02 (ref 1–1.03)
SQUAMOUS #/AREA URNS AUTO: ABNORMAL /LPF
UROBILINOGEN UR STRIP-ACNC: 1 E.U./DL
WBC #/AREA URNS AUTO: ABNORMAL /HPF

## 2024-04-15 PROCEDURE — 81001 URINALYSIS AUTO W/SCOPE: CPT | Performed by: OBSTETRICS & GYNECOLOGY

## 2024-04-15 PROCEDURE — 36415 COLL VENOUS BLD VENIPUNCTURE: CPT | Performed by: OBSTETRICS & GYNECOLOGY

## 2024-04-15 PROCEDURE — 85018 HEMOGLOBIN: CPT | Performed by: OBSTETRICS & GYNECOLOGY

## 2024-04-15 PROCEDURE — 99207 PR PRENATAL VISIT: CPT | Performed by: OBSTETRICS & GYNECOLOGY

## 2024-04-15 RX ORDER — METHYLPREDNISOLONE SODIUM SUCCINATE 125 MG/2ML
125 INJECTION, POWDER, LYOPHILIZED, FOR SOLUTION INTRAMUSCULAR; INTRAVENOUS
Status: CANCELLED
Start: 2024-04-22

## 2024-04-15 RX ORDER — EPINEPHRINE 1 MG/ML
0.3 INJECTION, SOLUTION, CONCENTRATE INTRAVENOUS EVERY 5 MIN PRN
Status: CANCELLED | OUTPATIENT
Start: 2024-04-22

## 2024-04-15 RX ORDER — DIPHENHYDRAMINE HYDROCHLORIDE 50 MG/ML
50 INJECTION INTRAMUSCULAR; INTRAVENOUS
Status: CANCELLED
Start: 2024-04-22

## 2024-04-15 RX ORDER — HEPARIN SODIUM (PORCINE) LOCK FLUSH IV SOLN 100 UNIT/ML 100 UNIT/ML
5 SOLUTION INTRAVENOUS
Status: CANCELLED | OUTPATIENT
Start: 2024-04-22

## 2024-04-15 RX ORDER — MEPERIDINE HYDROCHLORIDE 25 MG/ML
25 INJECTION INTRAMUSCULAR; INTRAVENOUS; SUBCUTANEOUS EVERY 30 MIN PRN
Status: CANCELLED | OUTPATIENT
Start: 2024-04-22

## 2024-04-15 RX ORDER — ALBUTEROL SULFATE 90 UG/1
1-2 AEROSOL, METERED RESPIRATORY (INHALATION)
Status: CANCELLED
Start: 2024-04-22

## 2024-04-15 RX ORDER — HEPARIN SODIUM,PORCINE 10 UNIT/ML
5-20 VIAL (ML) INTRAVENOUS DAILY PRN
Status: CANCELLED | OUTPATIENT
Start: 2024-04-22

## 2024-04-15 RX ORDER — ALBUTEROL SULFATE 0.83 MG/ML
2.5 SOLUTION RESPIRATORY (INHALATION)
Status: CANCELLED | OUTPATIENT
Start: 2024-04-22

## 2024-04-15 NOTE — PROGRESS NOTES
Patient presents for routine prenatal visit at 30w1d.  Patient with complaint. She reports having some question about a UTI.  She reports having a lot of anxiety related to her on-going court case.  She reports having panic attacks. Discussed why we would want to avoid those types of medications in pregnancy.  Letter given to ask for postponement of the court proceedings until after the delivery.  Plan to re-check hgb. If there is any more drop in the hgb, then we will plan on doing iron infusions.  PE: See OB vitals  Body mass index is 36.43 kg/m .    No vaginal bleeding, loss of fluid, or contractions  Questions asked and answered.      Ravi Ashraf MD FACOG

## 2024-04-15 NOTE — LETTER
[facility]  [address] [facility] [id number] [phone]    Gloria Sun    To whom it may concern:    Gloria is under our care for her pregnancy.  She is 30w1d and Estimated Date of Delivery: Jun 23, 2024 .  Gloria also suffers from anxiety, but is unable to be on medication for this due to the risk to the baby.  The court proceedings are severely exacerbating her anxiety.  We are requesting a postponement of the court proceedings until after her delivery.  Once she is delivered, there would be no risk to the baby and we could treat her anxiety.    Thank you.    Ravi Ashraf MD FACOG  April 15, 2024

## 2024-04-24 ENCOUNTER — PRENATAL OFFICE VISIT (OUTPATIENT)
Dept: OBGYN | Facility: CLINIC | Age: 34
End: 2024-04-24
Payer: COMMERCIAL

## 2024-04-24 VITALS
DIASTOLIC BLOOD PRESSURE: 73 MMHG | BODY MASS INDEX: 37.1 KG/M2 | OXYGEN SATURATION: 100 % | SYSTOLIC BLOOD PRESSURE: 126 MMHG | WEIGHT: 201.6 LBS | HEART RATE: 82 BPM | HEIGHT: 62 IN

## 2024-04-24 DIAGNOSIS — O23.599 BACTERIAL VAGINOSIS IN PREGNANCY: ICD-10-CM

## 2024-04-24 DIAGNOSIS — N89.8 VAGINAL ITCHING: Primary | ICD-10-CM

## 2024-04-24 DIAGNOSIS — B96.89 BACTERIAL VAGINOSIS IN PREGNANCY: ICD-10-CM

## 2024-04-24 LAB
CLUE CELLS: PRESENT
TRICHOMONAS, WET PREP: ABNORMAL
WBC'S/HIGH POWER FIELD, WET PREP: ABNORMAL
YEAST, WET PREP: ABNORMAL

## 2024-04-24 PROCEDURE — 87210 SMEAR WET MOUNT SALINE/INK: CPT | Performed by: NURSE PRACTITIONER

## 2024-04-24 PROCEDURE — 99459 PELVIC EXAMINATION: CPT | Performed by: NURSE PRACTITIONER

## 2024-04-24 PROCEDURE — 99213 OFFICE O/P EST LOW 20 MIN: CPT | Mod: 25 | Performed by: NURSE PRACTITIONER

## 2024-04-24 RX ORDER — METRONIDAZOLE 500 MG/1
500 TABLET ORAL 2 TIMES DAILY
Qty: 14 TABLET | Refills: 0 | Status: SHIPPED | OUTPATIENT
Start: 2024-04-24 | End: 2024-05-01

## 2024-04-24 NOTE — PATIENT INSTRUCTIONS
"Weeks 30 to 32 of Your Pregnancy: Care Instructions  Your baby is growing more every day. Its eyes can open and close, and it may have hair on its head. Your baby may sleep 20 to 45 minutes at a time and is more active at certain times.    You should feel your baby move several times every day. Your baby now turns less and kicks more.    This is a good time to tour your hospital or birthing center. You may also want to find childcare if needed.    To ease heartburn    Avoid foods that make your symptoms worse, such as chocolate, spicy foods, and caffeine.  Avoid bending over or lying down after meals.  Do not eat for 2 hours before bedtime.  Take antacids like Tums, but don't take ones that have sodium bicarbonate, magnesium trisilicate, or aspirin.    To care for large, swollen veins (varicose veins)    Try to avoid standing for long periods of time.  Sit with your feet propped up.  Wear support hose.  Get some exercise every day, like walking or swimming.  Counting your baby's kicks  Your doctor may ask you to count your baby's movements, such as kicks, flutters, or rolls.    Find a quiet place, and get comfortable. Write down your start time. Count your baby's movements (except hiccups). When your baby has moved 10 times, you can stop counting. Write down how many minutes it took.    If an hour goes by and you don't feel 10 movements, have something to eat or drink. Count for another hour. If you don't feel at least 10 movements in the 2-hour period, call your doctor.  Follow-up care is a key part of your treatment and safety. Be sure to make and go to all appointments, and call your doctor if you are having problems. It's also a good idea to know your test results and keep a list of the medicines you take.  Where can you learn more?  Go to https://www.The Meishijie websitewise.net/patiented  Enter X471 in the search box to learn more about \"Weeks 30 to 32 of Your Pregnancy: Care Instructions.\"  Current as of: July 10, 2023     " "          Content Version: 14.0    0224-2393 [employer].   Care instructions adapted under license by your healthcare professional. If you have questions about a medical condition or this instruction, always ask your healthcare professional. [employer] disclaims any warranty or liability for your use of this information.      Learning About Birth Control After Childbirth  What is birth control?  Birth control is any method used to prevent pregnancy. If you have vaginal sex without birth control, you could get pregnant--even if you haven't started having periods again. You're less likely to get pregnant while breastfeeding, but it's still possible. Finding birth control that works for you can help avoid an unplanned pregnancy.  There are many kinds of birth control. Each has pros and cons. Find what works for you. Talk to your doctor if you've just given birth or are breastfeeding.    Long-acting reversible contraception (LARC). These are placed inside your body by a doctor. They can prevent pregnancy for years.  Examples include:  An implant (hormonal).  Copper intrauterine device (IUD).  Hormonal IUDs.    Short-acting hormonal methods. These release hormones. Examples include:  Combination birth control pills (\"the pill\").  Skin patches.  A vaginal ring.  A shot.  Mini-pills. Choose progestin-only options soon after giving birth.    Barrier methods. Use these every time you have vaginal sex.  Examples include:  External (male) condoms.  Internal (female) condoms.  Diaphragms.  Cervical caps.  Sponges.    Spermicides. These kill sperm or stop sperm from moving. They can be gels, creams, foams, films, or tablets. Use them before vaginal sex.  Examples include:  Nonoxynol-9.  pH regulator gel.    Permanent birth control (sterilization). This can be an option if you're sure that you don't want to get pregnant later.  Examples include:  Vasectomy.  Having tubes tied (tubal " "ligation).    Emergency contraception. This is a backup method. Use it if you didn't use birth control or your birth control method failed.  Examples include:  Copper and hormonal IUDs.  Emergency contraceptive pills.    Fertility awareness. You'll learn when you are most likely to become pregnant (are fertile). You can avoid vaginal sex at that time.  It's also called:  Natural family planning.  The rhythm method.    Breastfeeding. This is most effective when all of these are true:  Your baby is younger than 6 months old.  You're breastfeeding and not bottle-feeding at all.  You aren't having periods.  Follow-up care is a key part of your treatment and safety. Be sure to make and go to all appointments, and call your doctor if you are having problems. It's also a good idea to know your test results and keep a list of the medicines you take.  Where can you learn more?  Go to https://www.OpenGov.net/patiented  Enter X408 in the search box to learn more about \"Learning About Birth Control After Childbirth.\"  Current as of: July 10, 2023               Content Version: 14.0    1596-4774 [employer].   Care instructions adapted under license by your healthcare professional. If you have questions about a medical condition or this instruction, always ask your healthcare professional. [employer] disclaims any warranty or liability for your use of this information.                                                       If you have any questions regarding your visit, Please contact your care team.     [employer] Access Services: [phone]  To Schedule an Appointment 24/7  Call: [phone] [facility] CLINIC HOURS TELEPHONE NUMBER     Arianna Walters-Surgery Scheduler  [facility]-Surgery Scheduler         Monday 7:30am-2:00pm    Tuesday 7:30am-4:00pm    Wednesday 7:30am-2:00pm    Thursday 7:30am-11:00am    Friday " 7:30am-2:00pm [facility]  [address] [place] [address]  Phone- [phone]   Fax- [phone]     Imaging Scheduling all locations  [phone]    [facility] Labor and Delivery  [address] [place] [address] [place] [address]  [phone]         Urgent Care locations:  [facility]   Monday-Friday  10 am - 8 pm  Saturday and Sunday   9 am - 5 pm     [phone]   If you need a medication refill, please contact your pharmacy. Please allow 3 business days for your refill to be completed.  As always, Thank you for trusting us with your healthcare needs!      see additional instructions from your care team below

## 2024-04-24 NOTE — PROGRESS NOTES
"  Assessment & Plan     Vaginal itching  - UT PELVIC EXAMINATION  - Wet preparation    Bacterial vaginosis in pregnancy  Prescription sent to pharmacy. Discussed changes in her TP and laundry detergent.   - metroNIDAZOLE (FLAGYL) 500 MG tablet; Take 1 tablet (500 mg) by mouth 2 times daily for 7 days    Subjective   Gloria is a 33 year old, presenting for the following health issues:  Prenatal Care (31w3d) and Vaginal Itching    HPI     Patient is currently 31 weeks gestation presenting today for problem visit for vulvar and anal itching. Prenatal visit last week and urine was checked due to symptoms and negative for infection.   For 3 days, noting vulvar and anal itching-does have an external hemorrhoid. The itching around the vulva is primarily around the labia, slightly to introitus. Whitish discharge noted last week, no odor. No current urinary symptoms. Has changed TP and laundry detergent in the last week.   Feeling fetal movement. No consistent contractions, vaginal bleeding leaking of fluid.   Iron infusions scheduled.       Review of Systems  Constitutional, HEENT, cardiovascular, pulmonary, gi and gu systems are negative, except as otherwise noted.      Objective    /73 (BP Location: Right arm, Patient Position: Sitting, Cuff Size: Adult Regular)   Pulse 82   Ht 1.575 m (5' 2\")   Wt 91.4 kg (201 lb 9.6 oz)   LMP 08/16/2023 (Exact Date)   SpO2 100%   BMI 36.87 kg/m    Body mass index is 36.87 kg/m .  Physical Exam   GENERAL: alert and no distress   (female): normal female external genitalia, normal urethral meatus, normal vaginal mucosa-small amount thicker white discharge  MS: no gross musculoskeletal defects noted, no edema  SKIN: no suspicious lesions or rashes  PSYCH: mentation appears normal, affect normal/bright    Results for orders placed or performed in visit on 04/24/24 (from the past 24 hour(s))   Wet preparation    Specimen: Vagina; Swab   Result Value Ref Range    Trichomonas " Absent Absent    Yeast Absent Absent    Clue Cells Present (A) Absent    WBCs/high power field 3+ (A) None           Signed Electronically by: OSEAS Kenny CNP

## 2024-04-25 ENCOUNTER — INFUSION THERAPY VISIT (OUTPATIENT)
Dept: INFUSION THERAPY | Facility: CLINIC | Age: 34
End: 2024-04-25
Attending: OBSTETRICS & GYNECOLOGY
Payer: COMMERCIAL

## 2024-04-25 VITALS
TEMPERATURE: 98.6 F | BODY MASS INDEX: 37.17 KG/M2 | SYSTOLIC BLOOD PRESSURE: 113 MMHG | OXYGEN SATURATION: 98 % | WEIGHT: 203.2 LBS | HEART RATE: 82 BPM | RESPIRATION RATE: 14 BRPM | DIASTOLIC BLOOD PRESSURE: 71 MMHG

## 2024-04-25 DIAGNOSIS — D50.9 MATERNAL IRON DEFICIENCY ANEMIA AFFECTING PREGNANCY, ANTEPARTUM: Primary | ICD-10-CM

## 2024-04-25 DIAGNOSIS — O99.019 MATERNAL IRON DEFICIENCY ANEMIA AFFECTING PREGNANCY, ANTEPARTUM: Primary | ICD-10-CM

## 2024-04-25 PROCEDURE — 250N000011 HC RX IP 250 OP 636: Performed by: OBSTETRICS & GYNECOLOGY

## 2024-04-25 PROCEDURE — 99207 PR NO CHARGE LOS: CPT

## 2024-04-25 PROCEDURE — 96365 THER/PROPH/DIAG IV INF INIT: CPT

## 2024-04-25 PROCEDURE — 258N000003 HC RX IP 258 OP 636: Performed by: OBSTETRICS & GYNECOLOGY

## 2024-04-25 PROCEDURE — 96366 THER/PROPH/DIAG IV INF ADDON: CPT

## 2024-04-25 RX ORDER — DIPHENHYDRAMINE HYDROCHLORIDE 50 MG/ML
50 INJECTION INTRAMUSCULAR; INTRAVENOUS
Status: CANCELLED
Start: 2024-04-27

## 2024-04-25 RX ORDER — ALBUTEROL SULFATE 0.83 MG/ML
2.5 SOLUTION RESPIRATORY (INHALATION)
Status: CANCELLED | OUTPATIENT
Start: 2024-04-27

## 2024-04-25 RX ORDER — MEPERIDINE HYDROCHLORIDE 25 MG/ML
25 INJECTION INTRAMUSCULAR; INTRAVENOUS; SUBCUTANEOUS EVERY 30 MIN PRN
Status: CANCELLED | OUTPATIENT
Start: 2024-04-27

## 2024-04-25 RX ORDER — HEPARIN SODIUM (PORCINE) LOCK FLUSH IV SOLN 100 UNIT/ML 100 UNIT/ML
5 SOLUTION INTRAVENOUS
Status: CANCELLED | OUTPATIENT
Start: 2024-04-27

## 2024-04-25 RX ORDER — EPINEPHRINE 1 MG/ML
0.3 INJECTION, SOLUTION INTRAMUSCULAR; SUBCUTANEOUS EVERY 5 MIN PRN
Status: CANCELLED | OUTPATIENT
Start: 2024-04-27

## 2024-04-25 RX ORDER — METHYLPREDNISOLONE SODIUM SUCCINATE 125 MG/2ML
125 INJECTION, POWDER, LYOPHILIZED, FOR SOLUTION INTRAMUSCULAR; INTRAVENOUS
Status: CANCELLED
Start: 2024-04-27

## 2024-04-25 RX ORDER — ALBUTEROL SULFATE 90 UG/1
1-2 AEROSOL, METERED RESPIRATORY (INHALATION)
Status: CANCELLED
Start: 2024-04-27

## 2024-04-25 RX ORDER — HEPARIN SODIUM,PORCINE 10 UNIT/ML
5-20 VIAL (ML) INTRAVENOUS DAILY PRN
Status: CANCELLED | OUTPATIENT
Start: 2024-04-27

## 2024-04-25 RX ADMIN — SODIUM CHLORIDE 250 ML: 9 INJECTION, SOLUTION INTRAVENOUS at 12:03

## 2024-04-25 RX ADMIN — IRON SUCROSE 300 MG: 20 INJECTION, SOLUTION INTRAVENOUS at 12:05

## 2024-04-25 NOTE — PROGRESS NOTES
Infusion Nursing Note:  Gloria Sun presents today for Venofer dose 1 of 3.    Patient seen by provider today: No   present during visit today: Not Applicable.    Note:   Oriented patient to the infusion department, including how/when to use the call light.     Intravenous Access:  Peripheral IV placed.    Treatment Conditions:  Not Applicable.      Post Infusion Assessment:  Patient tolerated infusion without incident.  Blood return noted pre and post infusion.  Site patent and intact, free from redness, edema or discomfort.  No evidence of extravasations.  Access discontinued per protocol.       Discharge Plan:   AVS to patient via MYCHART.  Patient will return 5/8/24  for next appointment.   Patient discharged in stable condition accompanied by: self.  Departure Mode: Ambulatory.      Tanya Garrido RN

## 2024-05-01 ENCOUNTER — TELEPHONE (OUTPATIENT)
Dept: FAMILY MEDICINE | Facility: CLINIC | Age: 34
End: 2024-05-01
Payer: COMMERCIAL

## 2024-05-01 ENCOUNTER — TELEPHONE (OUTPATIENT)
Dept: OBGYN | Facility: CLINIC | Age: 34
End: 2024-05-01
Payer: COMMERCIAL

## 2024-05-01 ENCOUNTER — ANCILLARY ORDERS (OUTPATIENT)
Dept: OBGYN | Facility: CLINIC | Age: 34
End: 2024-05-01

## 2024-05-01 DIAGNOSIS — O09.92 HIGH-RISK PREGNANCY IN SECOND TRIMESTER: Primary | ICD-10-CM

## 2024-05-01 NOTE — TELEPHONE ENCOUNTER
" 32w3d     Symptoms started 2 days ago, feeling waves of \"Super dizzy\", headaches 4-5 out of 10, lower pelvic pain, nausea, and vomiting 2-3 times today. Good fetal movement. Denies diarrhea.    Hx anemia, last Hgb 10.0    Recommend [place] be [facility] at [facility]. Pt agreeable.    Notified [facility] Charge ROCIO Brennan RN on 2024 at 11:09 AM    "

## 2024-05-01 NOTE — TELEPHONE ENCOUNTER
Patient got transferred around through central scheduling - is a [facility] patient but got connect with RD.  Explain that I work in a different clinic and cannot triage.    Advise I will send a message to the triage group to call her back.  Advised if she does not get a call in the next 10-15 minutes to call back.    Razia Waggoner, RN

## 2024-05-01 NOTE — TELEPHONE ENCOUNTER
"Priority call from central scheduling - Stated patient had Dizziness, nausea, and abdominal pain. Call had disconnected mid call upon telling central scheduling to bring patient on the line.    Writer attempted to call patient back and received \"Call could not be completed as dialed. Please hang up and dial again\".    If central scheduling calls back with patient, please triage.    Augusto Sanches RN  [facility]    "

## 2024-05-02 ENCOUNTER — ANCILLARY PROCEDURE (OUTPATIENT)
Dept: ULTRASOUND IMAGING | Facility: CLINIC | Age: 34
End: 2024-05-02
Attending: OBSTETRICS & GYNECOLOGY
Payer: COMMERCIAL

## 2024-05-02 DIAGNOSIS — O09.92 HIGH-RISK PREGNANCY IN SECOND TRIMESTER: ICD-10-CM

## 2024-05-02 PROCEDURE — 76819 FETAL BIOPHYS PROFIL W/O NST: CPT | Mod: TC | Performed by: RADIOLOGY

## 2024-05-02 PROCEDURE — 76816 OB US FOLLOW-UP PER FETUS: CPT | Mod: TC | Performed by: RADIOLOGY

## 2024-05-10 NOTE — PATIENT INSTRUCTIONS
Weeks 34 to 36 of Your Pregnancy: Care Instructions  Your belly has grown quite large. It's almost time to give birth! Your baby's lungs are almost ready to breathe air. The skull bones are firm enough to protect your baby's head. But they're soft enough to move down through the birth canal.    You might be wondering what to expect during labor. Because each birth is different, there's no way to know exactly what childbirth will be like for you. Talk to your doctor or midwife about any concerns you have.    You'll probably have a test for group B streptococcus (GBS). GBS is bacteria that can live in the vagina and rectum. GBS can make your baby sick after birth. If you test positive, you'll get antibiotics during labor.    Choose what type of pain relief you would like during labor.  You can choose from a few types, including medicine and non-medicine options. You may want to use several types of pain relief.     Know how medicines can help with pain during labor.  Some medicines lower anxiety and help with some of the pain. Others make your lower body numb so that you will feel less pain.     Tell your doctor about your pain medicine choice.  Do this before you start labor or very early in your labor. You may be able to change your mind during labor.     Learn about the stages of labor.    The first stage includes the early (latent) and active phases of labor. Contractions start in early labor. During active labor, contractions get stronger, last longer, and happen more often. And the cervix opens more rapidly.  The second stage starts when you're ready to push. During this stage, your baby is born.  During the third stage, you'll usually have a few more contractions to push out the placenta.   Follow-up care is a key part of your treatment and safety. Be sure to make and go to all appointments, and call your doctor if you are having problems. It's also a good idea to know your test results and keep a list of the  "medicines you take.  Where can you learn more?  Go to https://www.[employer].net/patiented  Enter B912 in the search box to learn more about \"Weeks 34 to 36 of Your Pregnancy: Care Instructions.\"  Current as of: July 10, 2023               Content Version: 14.0    4303-4663 [employer].   Care instructions adapted under license by your healthcare professional. If you have questions about a medical condition or this instruction, always ask your healthcare professional. [employer] disclaims any warranty or liability for your use of this information.      Group B Strep During Pregnancy: Care Instructions  Overview     Group B strep infection is caused by a type of bacteria. It's a different kind of bacteria than the kind that causes strep throat.  You may have this kind of bacteria in your body. Sometimes it may cause an infection, but most of the time it doesn't make you sick or cause symptoms. But if you pass the bacteria to your baby during the birth, it can cause serious health problems for your baby.  If you have this bacteria in your body, you will get antibiotics when you are in labor. Antibiotics help prevent problems for a  baby.  After birth, doctors will watch and may test your baby. If your baby tests positive for Group B strep, your baby will get antibiotics.  If you plan to breastfeed your baby, don't worry. It will be safe to breastfeed.  Follow-up care is a key part of your treatment and safety. Be sure to make and go to all appointments, and call your doctor if you are having problems. It's also a good idea to know your test results and keep a list of the medicines you take.  How can you care for yourself at home?  If your doctor has prescribed antibiotics, take them as directed. Do not stop taking them just because you feel better. You need to take the full course of antibiotics.  Tell your doctor if you are allergic to any antibiotic.  If you go into labor, or your " "water breaks, go to the hospital. Your doctor will give you antibiotics to help protect your baby from infection.  Tell the doctors and nurses if you have an allergy to penicillin.  Tell the doctors and nurses at the hospital that you tested positive for group B strep.  When should you call for help?   Call your doctor now or seek immediate medical care if:    You have symptoms of a urinary tract infection. These may include:  Pain or burning when you urinate.  A frequent need to urinate without being able to pass much urine.  Pain in the flank, which is just below the rib cage and above the waist on either side of the back.  Blood in your urine.  A fever.     You think you are in labor or your water has broken.     You have pain in your belly or pelvis.   Watch closely for changes in your health, and be sure to contact your doctor if you have any problems.  Where can you learn more?  Go to https://www.[employer].net/patiented  Enter M001 in the search box to learn more about \"Group B Strep During Pregnancy: Care Instructions.\"  Current as of: June 12, 2023               Content Version: 14.0    5400-6481 [employer].   Care instructions adapted under license by your healthcare professional. If you have questions about a medical condition or this instruction, always ask your healthcare professional. [employer] disclaims any warranty or liability for your use of this information.                                                       If you have any questions regarding your visit, Please contact your care team.     [employer]: [phone]  To Schedule an Appointment 24/7  Call: [phone] HOU[facility] TELEPHONE NUMBER     Arianna Walters-Surgery Scheduler  [facility] Scheduler         Monday 7:30am-2:00pm    Tuesday 7:30am-4:00pm    Wednesday 7:30am-2:00pm    Thursday " 7:30am-11:00am    Friday 7:30am-2:00pm [facility]  [address] [place] [address]  Phone- [phone]   Fax- [phone]     Imaging Scheduling all locations  [phone]    [facility] Labor and Delivery  [address] [place] [address] [place] [address]  [phone]         Urgent Care locations:  [facility]   Monday-Friday  10 am - 8 pm  Saturday and Sunday   9 am - 5 pm     [phone]   If you need a medication refill, please contact your pharmacy. Please allow 3 business days for your refill to be completed.  As always, Thank you for trusting us with your healthcare needs!      see additional instructions from your care team below

## 2024-05-13 ENCOUNTER — PRENATAL OFFICE VISIT (OUTPATIENT)
Dept: OBGYN | Facility: CLINIC | Age: 34
End: 2024-05-13
Payer: COMMERCIAL

## 2024-05-13 ENCOUNTER — INFUSION THERAPY VISIT (OUTPATIENT)
Dept: INFUSION THERAPY | Facility: CLINIC | Age: 34
End: 2024-05-13
Attending: OBSTETRICS & GYNECOLOGY
Payer: COMMERCIAL

## 2024-05-13 VITALS
BODY MASS INDEX: 38.16 KG/M2 | DIASTOLIC BLOOD PRESSURE: 71 MMHG | HEART RATE: 86 BPM | SYSTOLIC BLOOD PRESSURE: 123 MMHG | WEIGHT: 207.4 LBS | HEIGHT: 62 IN | OXYGEN SATURATION: 97 %

## 2024-05-13 VITALS
TEMPERATURE: 98.6 F | OXYGEN SATURATION: 99 % | RESPIRATION RATE: 16 BRPM | HEART RATE: 62 BPM | DIASTOLIC BLOOD PRESSURE: 68 MMHG | SYSTOLIC BLOOD PRESSURE: 105 MMHG

## 2024-05-13 DIAGNOSIS — D50.9 MATERNAL IRON DEFICIENCY ANEMIA AFFECTING PREGNANCY, ANTEPARTUM: Primary | ICD-10-CM

## 2024-05-13 DIAGNOSIS — O09.93 HIGH-RISK PREGNANCY IN THIRD TRIMESTER: Primary | ICD-10-CM

## 2024-05-13 DIAGNOSIS — O99.019 MATERNAL IRON DEFICIENCY ANEMIA AFFECTING PREGNANCY, ANTEPARTUM: ICD-10-CM

## 2024-05-13 DIAGNOSIS — D50.9 MATERNAL IRON DEFICIENCY ANEMIA AFFECTING PREGNANCY, ANTEPARTUM: ICD-10-CM

## 2024-05-13 DIAGNOSIS — O99.019 MATERNAL IRON DEFICIENCY ANEMIA AFFECTING PREGNANCY, ANTEPARTUM: Primary | ICD-10-CM

## 2024-05-13 DIAGNOSIS — O24.410 DIET CONTROLLED GESTATIONAL DIABETES MELLITUS (GDM), ANTEPARTUM: ICD-10-CM

## 2024-05-13 PROCEDURE — 99207 PR PRENATAL VISIT: CPT | Performed by: NURSE PRACTITIONER

## 2024-05-13 PROCEDURE — 258N000003 HC RX IP 258 OP 636: Performed by: OBSTETRICS & GYNECOLOGY

## 2024-05-13 PROCEDURE — 99207 PR NO CHARGE LOS: CPT

## 2024-05-13 PROCEDURE — 250N000011 HC RX IP 250 OP 636: Performed by: OBSTETRICS & GYNECOLOGY

## 2024-05-13 PROCEDURE — 96365 THER/PROPH/DIAG IV INF INIT: CPT

## 2024-05-13 PROCEDURE — 96366 THER/PROPH/DIAG IV INF ADDON: CPT

## 2024-05-13 RX ORDER — MEPERIDINE HYDROCHLORIDE 25 MG/ML
25 INJECTION INTRAMUSCULAR; INTRAVENOUS; SUBCUTANEOUS EVERY 30 MIN PRN
Status: CANCELLED | OUTPATIENT
Start: 2024-05-15

## 2024-05-13 RX ORDER — ALBUTEROL SULFATE 90 UG/1
1-2 AEROSOL, METERED RESPIRATORY (INHALATION)
Status: CANCELLED
Start: 2024-05-15

## 2024-05-13 RX ORDER — DIPHENHYDRAMINE HYDROCHLORIDE 50 MG/ML
50 INJECTION INTRAMUSCULAR; INTRAVENOUS
Status: CANCELLED
Start: 2024-05-15

## 2024-05-13 RX ORDER — ALBUTEROL SULFATE 0.83 MG/ML
2.5 SOLUTION RESPIRATORY (INHALATION)
Status: CANCELLED | OUTPATIENT
Start: 2024-05-15

## 2024-05-13 RX ORDER — HEPARIN SODIUM,PORCINE 10 UNIT/ML
5-20 VIAL (ML) INTRAVENOUS DAILY PRN
Status: CANCELLED | OUTPATIENT
Start: 2024-05-15

## 2024-05-13 RX ORDER — EPINEPHRINE 1 MG/ML
0.3 INJECTION, SOLUTION INTRAMUSCULAR; SUBCUTANEOUS EVERY 5 MIN PRN
Status: CANCELLED | OUTPATIENT
Start: 2024-05-15

## 2024-05-13 RX ORDER — HEPARIN SODIUM (PORCINE) LOCK FLUSH IV SOLN 100 UNIT/ML 100 UNIT/ML
5 SOLUTION INTRAVENOUS
Status: CANCELLED | OUTPATIENT
Start: 2024-05-15

## 2024-05-13 RX ORDER — METHYLPREDNISOLONE SODIUM SUCCINATE 125 MG/2ML
125 INJECTION, POWDER, LYOPHILIZED, FOR SOLUTION INTRAMUSCULAR; INTRAVENOUS
Status: CANCELLED
Start: 2024-05-15

## 2024-05-13 RX ADMIN — IRON SUCROSE 300 MG: 20 INJECTION, SOLUTION INTRAVENOUS at 13:20

## 2024-05-13 RX ADMIN — SODIUM CHLORIDE 250 ML: 9 INJECTION, SOLUTION INTRAVENOUS at 13:15

## 2024-05-13 NOTE — PROGRESS NOTES
Patient presents for routine prenatal visit. Prenatal flowsheet reviewed and updated as needed.  Denies vaginal bleeding, loss of fluid, contractions or cramping. Denies headache, nausea/vomiting, upper abdominal pain, vision changes, lower extremity swelling, chest pain or shortness of breath.     Anticipatory guidance appropriate for gestational age reviewed.  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   Hx: Gastric sleeve-planning serial growth ultrasound.  GDM-no follow up with Diab Ed since March-encourage to send BS log in for review, even if values normal.  Gilbert's Syndrome.  Anemia-1 iron infusion completed, next is today.    Routine prenatal care:  GBS on return to clinic.    Questions asked and answered. Next OB visit in 2 week(s) with OB Physician.    Arianna FAROOQ CNP

## 2024-05-13 NOTE — PROGRESS NOTES
Infusion Nursing Note:  Gloria Sun presents today for Venofer 2/3.    Patient seen by provider today: No   present during visit today: Not Applicable.    Note: Patient stated she has had some nausea and vomiting with her pregnancy. Reports tolerating the first venofer infusion well.     Intravenous Access:  Peripheral IV placed.    Treatment Conditions:  Not Applicable.    Post Infusion Assessment:  Patient tolerated infusion without incident.  Blood return noted pre and post infusion.  Site patent and intact, free from redness, edema or discomfort.  No evidence of extravasations.  Access discontinued per protocol.     Discharge Plan:   Discharge instructions reviewed with: Patient.  Patient and/or family verbalized understanding of discharge instructions and all questions answered.  Patient discharged in stable condition accompanied by: self.  Departure Mode: Ambulatory.  Future appts have been reviewed and crosschecked with appt note and plan.      Tracy Simpson RN

## 2024-05-14 ENCOUNTER — ANCILLARY ORDERS (OUTPATIENT)
Dept: OBGYN | Facility: CLINIC | Age: 34
End: 2024-05-14

## 2024-05-14 DIAGNOSIS — O09.92 HIGH-RISK PREGNANCY IN SECOND TRIMESTER: Primary | ICD-10-CM

## 2024-05-20 NOTE — PATIENT INSTRUCTIONS
Weeks 34 to 36 of Your Pregnancy: Care Instructions  Your belly has grown quite large. It's almost time to give birth! Your baby's lungs are almost ready to breathe air. The skull bones are firm enough to protect your baby's head. But they're soft enough to move down through the birth canal.    You might be wondering what to expect during labor. Because each birth is different, there's no way to know exactly what childbirth will be like for you. Talk to your doctor or midwife about any concerns you have.    You'll probably have a test for group B streptococcus (GBS). GBS is bacteria that can live in the vagina and rectum. GBS can make your baby sick after birth. If you test positive, you'll get antibiotics during labor.    Choose what type of pain relief you would like during labor.  You can choose from a few types, including medicine and non-medicine options. You may want to use several types of pain relief.     Know how medicines can help with pain during labor.  Some medicines lower anxiety and help with some of the pain. Others make your lower body numb so that you will feel less pain.     Tell your doctor about your pain medicine choice.  Do this before you start labor or very early in your labor. You may be able to change your mind during labor.     Learn about the stages of labor.    The first stage includes the early (latent) and active phases of labor. Contractions start in early labor. During active labor, contractions get stronger, last longer, and happen more often. And the cervix opens more rapidly.  The second stage starts when you're ready to push. During this stage, your baby is born.  During the third stage, you'll usually have a few more contractions to push out the placenta.   Follow-up care is a key part of your treatment and safety. Be sure to make and go to all appointments, and call your doctor if you are having problems. It's also a good idea to know your test results and keep a list of the  "medicines you take.  Where can you learn more?  Go to https://www.Serometrix.net/patiented  Enter B912 in the search box to learn more about \"Weeks 34 to 36 of Your Pregnancy: Care Instructions.\"  Current as of: July 10, 2023               Content Version: 14.0    3835-8690 [employer].   Care instructions adapted under license by your healthcare professional. If you have questions about a medical condition or this instruction, always ask your healthcare professional. [employer] disclaims any warranty or liability for your use of this information.      Group B Strep During Pregnancy: Care Instructions  Overview     Group B strep infection is caused by a type of bacteria. It's a different kind of bacteria than the kind that causes strep throat.  You may have this kind of bacteria in your body. Sometimes it may cause an infection, but most of the time it doesn't make you sick or cause symptoms. But if you pass the bacteria to your baby during the birth, it can cause serious health problems for your baby.  If you have this bacteria in your body, you will get antibiotics when you are in labor. Antibiotics help prevent problems for a  baby.  After birth, doctors will watch and may test your baby. If your baby tests positive for Group B strep, your baby will get antibiotics.  If you plan to breastfeed your baby, don't worry. It will be safe to breastfeed.  Follow-up care is a key part of your treatment and safety. Be sure to make and go to all appointments, and call your doctor if you are having problems. It's also a good idea to know your test results and keep a list of the medicines you take.  How can you care for yourself at home?  If your doctor has prescribed antibiotics, take them as directed. Do not stop taking them just because you feel better. You need to take the full course of antibiotics.  Tell your doctor if you are allergic to any antibiotic.  If you go into labor, or your " "water breaks, go to the hospital. Your doctor will give you antibiotics to help protect your baby from infection.  Tell the doctors and nurses if you have an allergy to penicillin.  Tell the doctors and nurses at the hospital that you tested positive for group B strep.  When should you call for help?   Call your doctor now or seek immediate medical care if:    You have symptoms of a urinary tract infection. These may include:  Pain or burning when you urinate.  A frequent need to urinate without being able to pass much urine.  Pain in the flank, which is just below the rib cage and above the waist on either side of the back.  Blood in your urine.  A fever.     You think you are in labor or your water has broken.     You have pain in your belly or pelvis.   Watch closely for changes in your health, and be sure to contact your doctor if you have any problems.  Where can you learn more?  Go to https://www.Mantis [employer] Arts.Modumetal/patiented  Enter M001 in the search box to learn more about \"Group B Strep During Pregnancy: Care Instructions.\"  Current as of: June 12, 2023               Content Version: 14.0    1919-8296 [employer].   Care instructions adapted under license by your healthcare professional. If you have questions about a medical condition or this instruction, always ask your healthcare professional. [employer] disclaims any warranty or liability for your use of this information.      Circumcision in Infants: What to Expect at Home  Your Child's Recovery  After circumcision, your baby's penis may look red and swollen. It may have petroleum jelly and gauze on it. The gauze will likely come off when your baby urinates. Follow your doctor's directions about whether to put clean gauze back on your baby's penis or to leave the gauze off. If you need to remove gauze from the penis, use warm water to soak the gauze and gently loosen it.  The doctor may have used a Plastibell device to do the " circumcision. If so, your baby will have a plastic ring around the head of the penis. The ring should fall off by itself in 10 to 12 days.  A thin, yellow film may form over the area the day after the procedure. This is part of the normal healing process. It should go away in a few days.  Your baby may seem fussy while the area heals. It may hurt for your baby to urinate. This pain often gets better in 3 or 4 days. But it may last for up to 2 weeks.  Even though your baby's penis will likely start to feel better after 3 or 4 days, it may look worse. The penis often starts to look like it's getting better after about 7 to 10 days.  This care sheet gives you a general idea about how long it will take for your child to recover. But each child recovers at a different pace. Follow the steps below to help your child get better as quickly as possible.  How can you care for your child at home?  Activity    Let your baby rest as much as possible. Sleeping will help with recovery.     You can give your baby a sponge bath the day after surgery. Ask your doctor when it is okay to give your baby a bath.   Medicines    Your doctor will tell you if and when your child can restart any medicines. The doctor will also give you instructions about your child taking any new medicines.     Your doctor may recommend giving your baby acetaminophen (Tylenol) to help with pain after the procedure. Be safe with medicines. Give your child medicines exactly as prescribed. Call your doctor if you think your child is having a problem with a medicine.     Do not give your child two or more pain medicines at the same time unless the doctor told you to. Many pain medicines have acetaminophen, which is Tylenol. Too much acetaminophen (Tylenol) can be harmful.   Circumcision care    Always wash your hands before and after touching the circumcision area.     Gently wash your baby's penis with plain, warm water after each diaper change, and pat it dry.  "Do not use soap. Don't use hydrogen peroxide or alcohol. They can slow healing.     Do not try to remove the film that forms on the penis. The film will go away on its own.     Put plenty of petroleum jelly (such as Vaseline) on the circumcision area during each diaper change. This will prevent your baby's penis from sticking to the diaper while it heals.     Fasten your baby's diapers loosely so that there is less pressure on the penis while it heals.   Follow-up care is a key part of your child's treatment and safety. Be sure to make and go to all appointments, and call your doctor if your child is having problems. It's also a good idea to know your child's test results and keep a list of the medicines your child takes.  When should you call for help?   Call your doctor now or seek immediate medical care if:    Your baby has a fever over 100.4 F.     Your baby is extremely fussy or irritable, has a high-pitched cry, or refuses to eat.     Your baby does not have a wet diaper within 12 hours after the circumcision.     You find a spot of bleeding larger than a 2-inch [place] from the incision.     Your baby has signs of infection. Signs may include severe swelling; redness; a red streak on the shaft of the penis; or a thick, yellow discharge.   Watch closely for changes in your child's health, and be sure to contact your doctor if:    A Plastibell device was used for the circumcision and the ring has not fallen off after 10 to 12 days.   Where can you learn more?  Go to https://www.Care at Hand.net/patiented  Enter S255 in the search box to learn more about \"Circumcision in Infants: What to Expect at Home.\"  Current as of: October 24, 2023               Content Version: 14.0    7605-2443 [employer].   Care instructions adapted under license by your healthcare professional. If you have questions about a medical condition or this instruction, always ask your healthcare professional. [employer] " Incorporated disclaims any warranty or liability for your use of this information.                                                         If you have any questions regarding your visit, Please contact your care team.     [facility] Services: [phone]    To Schedule an Appointment 24/7  Call: [phone] HOU[facility] TELEPHONE NUMBER     Ravi Ashraf MD  Medical Director        Louisa-ROCIO Metz-ROCIO Walters-Surgery Scheduler  [facility]-Surgery Scheduler               Tuesday-[place]  7:30 a.m-4:30 p.m    Thursday-[place]  7:30 a.m-4:30 p.m    Typical Surgery Days: Tuesday or Friday [facility] [place]  [address] [place] [address]  PH: [phone]    Imaging Scheduling all locations  PH: [phone]     [facility]  [address] [place] [address] [place] [address]  PH: [phone]    [facility]  [address]  [place] [address]  PH: [phone] Fax      **Surgeries** Our Surgery Schedulers will contact you to schedule. If you do not receive a call within 3 business days, please call [phone].    Urgent Care locations:  [facility] Monday-Friday  10 am - 8 pm  Saturday and Sunday   9 am - 5 pm  Monday-Friday   10 am - 8 pm  Saturday and Sunday   9 am - 5 pm   [phone]   If you need a medication refill, please contact your pharmacy. Please allow 3 business days for your refill to be completed.  As always, Thank you for trusting us with your healthcare needs!    see additional instructions from your care team below

## 2024-05-22 ENCOUNTER — VIRTUAL VISIT (OUTPATIENT)
Dept: EDUCATION SERVICES | Facility: CLINIC | Age: 34
End: 2024-05-22
Payer: COMMERCIAL

## 2024-05-22 DIAGNOSIS — O24.410 DIET CONTROLLED GESTATIONAL DIABETES MELLITUS (GDM), ANTEPARTUM: Primary | ICD-10-CM

## 2024-05-22 PROCEDURE — 98966 PH1 ASSMT&MGMT NQHP 5-10: CPT | Mod: 93 | Performed by: REGISTERED NURSE

## 2024-05-22 NOTE — PATIENT INSTRUCTIONS
Weeks 34 to 36 of Your Pregnancy: Care Instructions  Your belly has grown quite large. It's almost time to give birth! Your baby's lungs are almost ready to breathe air. The skull bones are firm enough to protect your baby's head. But they're soft enough to move down through the birth canal.    You might be wondering what to expect during labor. Because each birth is different, there's no way to know exactly what childbirth will be like for you. Talk to your doctor or midwife about any concerns you have.    You'll probably have a test for group B streptococcus (GBS). GBS is bacteria that can live in the vagina and rectum. GBS can make your baby sick after birth. If you test positive, you'll get antibiotics during labor.    Choose what type of pain relief you would like during labor.  You can choose from a few types, including medicine and non-medicine options. You may want to use several types of pain relief.     Know how medicines can help with pain during labor.  Some medicines lower anxiety and help with some of the pain. Others make your lower body numb so that you will feel less pain.     Tell your doctor about your pain medicine choice.  Do this before you start labor or very early in your labor. You may be able to change your mind during labor.     Learn about the stages of labor.    The first stage includes the early (latent) and active phases of labor. Contractions start in early labor. During active labor, contractions get stronger, last longer, and happen more often. And the cervix opens more rapidly.  The second stage starts when you're ready to push. During this stage, your baby is born.  During the third stage, you'll usually have a few more contractions to push out the placenta.   Follow-up care is a key part of your treatment and safety. Be sure to make and go to all appointments, and call your doctor if you are having problems. It's also a good idea to know your test results and keep a list of the  "medicines you take.  Where can you learn more?  Go to https://www.Food.ee.net/patiented  Enter B912 in the search box to learn more about \"Weeks 34 to 36 of Your Pregnancy: Care Instructions.\"  Current as of: July 10, 2023               Content Version: 14.0    0419-6771 [employer].   Care instructions adapted under license by your healthcare professional. If you have questions about a medical condition or this instruction, always ask your healthcare professional. [employer] disclaims any warranty or liability for your use of this information.      Group B Strep During Pregnancy: Care Instructions  Overview     Group B strep infection is caused by a type of bacteria. It's a different kind of bacteria than the kind that causes strep throat.  You may have this kind of bacteria in your body. Sometimes it may cause an infection, but most of the time it doesn't make you sick or cause symptoms. But if you pass the bacteria to your baby during the birth, it can cause serious health problems for your baby.  If you have this bacteria in your body, you will get antibiotics when you are in labor. Antibiotics help prevent problems for a  baby.  After birth, doctors will watch and may test your baby. If your baby tests positive for Group B strep, your baby will get antibiotics.  If you plan to breastfeed your baby, don't worry. It will be safe to breastfeed.  Follow-up care is a key part of your treatment and safety. Be sure to make and go to all appointments, and call your doctor if you are having problems. It's also a good idea to know your test results and keep a list of the medicines you take.  How can you care for yourself at home?  If your doctor has prescribed antibiotics, take them as directed. Do not stop taking them just because you feel better. You need to take the full course of antibiotics.  Tell your doctor if you are allergic to any antibiotic.  If you go into labor, or your " "water breaks, go to the hospital. Your doctor will give you antibiotics to help protect your baby from infection.  Tell the doctors and nurses if you have an allergy to penicillin.  Tell the doctors and nurses at the hospital that you tested positive for group B strep.  When should you call for help?   Call your doctor now or seek immediate medical care if:    You have symptoms of a urinary tract infection. These may include:  Pain or burning when you urinate.  A frequent need to urinate without being able to pass much urine.  Pain in the flank, which is just below the rib cage and above the waist on either side of the back.  Blood in your urine.  A fever.     You think you are in labor or your water has broken.     You have pain in your belly or pelvis.   Watch closely for changes in your health, and be sure to contact your doctor if you have any problems.  Where can you learn more?  Go to https://www.Alliance Card.Industrial Toys/patiented  Enter M001 in the search box to learn more about \"Group B Strep During Pregnancy: Care Instructions.\"  Current as of: June 12, 2023               Content Version: 14.0    5727-1216 [employer].   Care instructions adapted under license by your healthcare professional. If you have questions about a medical condition or this instruction, always ask your healthcare professional. [employer] disclaims any warranty or liability for your use of this information.      Circumcision in Infants: What to Expect at Home  Your Child's Recovery  After circumcision, your baby's penis may look red and swollen. It may have petroleum jelly and gauze on it. The gauze will likely come off when your baby urinates. Follow your doctor's directions about whether to put clean gauze back on your baby's penis or to leave the gauze off. If you need to remove gauze from the penis, use warm water to soak the gauze and gently loosen it.  The doctor may have used a Plastibell device to do the " circumcision. If so, your baby will have a plastic ring around the head of the penis. The ring should fall off by itself in 10 to 12 days.  A thin, yellow film may form over the area the day after the procedure. This is part of the normal healing process. It should go away in a few days.  Your baby may seem fussy while the area heals. It may hurt for your baby to urinate. This pain often gets better in 3 or 4 days. But it may last for up to 2 weeks.  Even though your baby's penis will likely start to feel better after 3 or 4 days, it may look worse. The penis often starts to look like it's getting better after about 7 to 10 days.  This care sheet gives you a general idea about how long it will take for your child to recover. But each child recovers at a different pace. Follow the steps below to help your child get better as quickly as possible.  How can you care for your child at home?  Activity    Let your baby rest as much as possible. Sleeping will help with recovery.     You can give your baby a sponge bath the day after surgery. Ask your doctor when it is okay to give your baby a bath.   Medicines    Your doctor will tell you if and when your child can restart any medicines. The doctor will also give you instructions about your child taking any new medicines.     Your doctor may recommend giving your baby acetaminophen (Tylenol) to help with pain after the procedure. Be safe with medicines. Give your child medicines exactly as prescribed. Call your doctor if you think your child is having a problem with a medicine.     Do not give your child two or more pain medicines at the same time unless the doctor told you to. Many pain medicines have acetaminophen, which is Tylenol. Too much acetaminophen (Tylenol) can be harmful.   Circumcision care    Always wash your hands before and after touching the circumcision area.     Gently wash your baby's penis with plain, warm water after each diaper change, and pat it dry.  "Do not use soap. Don't use hydrogen peroxide or alcohol. They can slow healing.     Do not try to remove the film that forms on the penis. The film will go away on its own.     Put plenty of petroleum jelly (such as Vaseline) on the circumcision area during each diaper change. This will prevent your baby's penis from sticking to the diaper while it heals.     Fasten your baby's diapers loosely so that there is less pressure on the penis while it heals.   Follow-up care is a key part of your child's treatment and safety. Be sure to make and go to all appointments, and call your doctor if your child is having problems. It's also a good idea to know your child's test results and keep a list of the medicines your child takes.  When should you call for help?   Call your doctor now or seek immediate medical care if:    Your baby has a fever over 100.4 F.     Your baby is extremely fussy or irritable, has a high-pitched cry, or refuses to eat.     Your baby does not have a wet diaper within 12 hours after the circumcision.     You find a spot of bleeding larger than a 2-inch [place] from the incision.     Your baby has signs of infection. Signs may include severe swelling; redness; a red streak on the shaft of the penis; or a thick, yellow discharge.   Watch closely for changes in your child's health, and be sure to contact your doctor if:    A Plastibell device was used for the circumcision and the ring has not fallen off after 10 to 12 days.   Where can you learn more?  Go to https://www.Lending Club.net/patiented  Enter S255 in the search box to learn more about \"Circumcision in Infants: What to Expect at Home.\"  Current as of: October 24, 2023               Content Version: 14.0    3735-2688 [employer]wise[employer] Incorporated.   Care instructions adapted under license by your healthcare professional. If you have questions about a medical condition or this instruction, always ask your healthcare professional. [employer] " Incorporated disclaims any warranty or liability for your use of this information.                                                         If you have any questions regarding your visit, Please contact your care team.     [employer] Services: [phone]    To Schedule an Appointment 24/7  Call: [phone] HOURS TELEPHONE NUMBER     Ravi Ashraf MD  Medical Director        Louisa-ROCIO Metz-ROCIO Walters-Surgery Scheduler  [facility]-Surgery Scheduler               Tuesday-[place]  7:30 a.m-4:30 p.m    Thursday-[place]  7:30 a.m-4:30 p.m    Typical Surgery Days: Tuesday or Friday [facility] [place]  [address] [place] [address]  PH: [phone]    Imaging Scheduling all locations  PH: [phone]     [facility] Delivery  [address] [place] [address] [place] [address]  PH: [phone]    [facility]  [address]  [place] [address]  PH: [phone] Fax      **Surgeries** Our Surgery Schedulers will contact you to schedule. If you do not receive a call within 3 business days, please call [phone].    Urgent Care locations:  [facility] Monday-Friday  10 am - 8 pm  Saturday and Sunday   9 am - 5 pm  Monday-Friday   10 am - 8 pm  Saturday and Sunday   9 am - 5 pm   [phone]   If you need a medication refill, please contact your pharmacy. Please allow 3 business days for your refill to be completed.  As always, Thank you for trusting us with your healthcare needs!    see additional instructions from your care team below

## 2024-05-22 NOTE — PROGRESS NOTES
"Diabetes Self-Management Education & Support  Type of Service: Telephone Visit    Originating Location (Patient Location): Home  Distant Location (Provider Location): Offsite  Mode of Communication:  Telephone    Telephone Visit Start Time:  9:37 AM  Telephone Visit End Time (telephone visit stop time): 9:45 AM    How would patient like to obtain AVS? Pili    ASSESSMENT:  3-month follow-up for gestational diabetes education and counseling.  Gloria was not able to connect via video visit.  Converted to telephone visit.  When I called her she was dropping off a kid at school.  Explained she cannot be driving during a virtual visit.  Waited 5 minutes and called her when she got home.  Patient was back in her car driving to get her other child from school who was ill.  Gestational diabetes visit will need to be rescheduled.    SUBJECTIVE/OBJECTIVE:  Presents for education related to gestational diabetes.    Accompanied by: Self  Diabetes management related comments/concerns: blood sugars running low  Gestational weeks: 35w3d  Next OB Visit Date: 24  Number of previous pregnancies: 2  Previously had Gestational Diabetes: Yes  Had Diabetes Education before: Yes    Cultural Influences/Ethnic Background:  Not  or     LMP 2023 (Exact Date)       Wt Readings from Last 3 Encounters:   24 94.1 kg (207 lb 6.4 oz)   24 92.2 kg (203 lb 3.2 oz)   24 91.4 kg (201 lb 9.6 oz)        Estimated Date of Delivery: 2024    Blood Glucose/Ketone Log: Not available. Patient driving    Lifestyle and Health Behaviors:  Pre- vitamin?: Yes    Healthy Coping:  Life is \"super busy\" for more appointments     Current Management:  Taking medications for gestational diabetes?: No    INTERVENTION:  Educational topics covered today:  None    Educational Materials provided today:  No new materials provided today    PLAN:  Reschedule diabetes education virtual visit-next available that works with " your schedule.  Check glucose 4 times daily.  Check ketones once a week when readings are consistently negative.  Continue with recommended physical activity.  Continue to follow recommended meal plan: 30 to 45 g carbs at breakfast, 45 to 60 g carbs at lunch, 45 to 60 g carbs at supper, 15 to 30 g carbs at snacks.  Follow consistent CHO meal plan, eat CHO and protein/fat at all meals/snacks.    Call/e-mail/MyChart message diabetes educator if 3 or more blood sugars are above the goal in 1 week or if ketones are positive.      Blanche Velazco RD, LD, [facility]   Certified Diabetes Care and   [facility] Tuesdays and Thursdays  [facility] Wednesdays-virtual visits only    Time Spent: 8 minutes  Encounter Type: Individual    Any diabetes medication dose changes were made via the CDE Protocol and Collaborative Practice Agreement with the patient's OB/GYN provider. A copy of this encounter was shared with the provider.

## 2024-05-22 NOTE — LETTER
"    2024         RE: Gloria Sun  [address] [place] [address]        Dear Colleague,    Thank you for referring your patient, Gloria Sun, to the [facility]. Please see a copy of my visit note below.    Diabetes Self-Management Education & Support  Type of Service: Telephone Visit    Originating Location (Patient Location): Home  Distant Location (Provider Location): Offsite  Mode of Communication:  Telephone    Telephone Visit Start Time:  9:37 AM  Telephone Visit End Time (telephone visit stop time): 9:45 AM    How would patient like to obtain AVS? MyChart    ASSESSMENT:  3-month follow-up for gestational diabetes education and counseling.  Gloria was not able to connect via video visit.  Converted to telephone visit.  When I called her she was dropping off a kid at school.  Explained she cannot be driving during a virtual visit.  Waited 5 minutes and called her when she got home.  Patient was back in her car driving to get her other child from school who was ill.  Gestational diabetes visit will need to be rescheduled.    SUBJECTIVE/OBJECTIVE:  Presents for education related to gestational diabetes.    Accompanied by: Self  Diabetes management related comments/concerns: blood sugars running low  Gestational weeks: 35w3d  Next OB Visit Date: 24  Number of previous pregnancies: 2  Previously had Gestational Diabetes: Yes  Had Diabetes Education before: Yes    Cultural Influences/Ethnic Background:  Not  or     LMP 2023 (Exact Date)       Wt Readings from Last 3 Encounters:   24 94.1 kg (207 lb 6.4 oz)   24 92.2 kg (203 lb 3.2 oz)   24 91.4 kg (201 lb 9.6 oz)        Estimated Date of Delivery: 2024    Blood Glucose/Ketone Log: Not available. Patient driving    Lifestyle and Health Behaviors:  Pre- vitamin?: Yes    Healthy Coping:  Life is \"super busy\" for more appointments     Current Management:  Taking medications " for gestational diabetes?: No    INTERVENTION:  Educational topics covered today:  None    Educational Materials provided today:  No new materials provided today    PLAN:  Reschedule diabetes education virtual visit-next available that works with your schedule.  Check glucose 4 times daily.  Check ketones once a week when readings are consistently negative.  Continue with recommended physical activity.  Continue to follow recommended meal plan: 30 to 45 g carbs at breakfast, 45 to 60 g carbs at lunch, 45 to 60 g carbs at supper, 15 to 30 g carbs at snacks.  Follow consistent CHO meal plan, eat CHO and protein/fat at all meals/snacks.    Call/e-mail/[employer] message diabetes educator if 3 or more blood sugars are above the goal in 1 week or if ketones are positive.      Blanche Velazco RD, LD, [facility]ES   Certified Diabetes Care and   [facility] Tuesdays and Thursdays  [facility] Wednesdays-virtual visits only    Time Spent: 8 minutes  Encounter Type: Individual    Any diabetes medication dose changes were made via the CDE Protocol and Collaborative Practice Agreement with the patient's OB/GYN provider. A copy of this encounter was shared with the provider.

## 2024-05-22 NOTE — PATIENT INSTRUCTIONS
1. Continue to check urine for ketones in the morning. Your goal is negative or trace ketones. If you have ketones in your urine it means you are not eating enough before you go to bed. Eat a larger bedtime snack and include protein. Remember that ketones are not good for your baby. Drinking water during the day helps to dilute ketones.  Once you have negative ketones for 1 week decrease urine ketone testing to once per week.    2. Continue to check blood sugar 4 times per day. Before breakfast and 1 hour after meals.        Blood sugar goals:       Before breakfast: less 95      1 hour after meals: less 140      2 hours after meals: less 120    3. Continue to eat 3 meals and 3 snacks per day according to the meal plan.   Breakfast: 30-45 grams of carbohydrate with protein   Lunch and dinner: 45-60 grams of carbohydrate  Snacks: 15-30 grams of carbohydrate with protein    4. Continue to stay active after meals to manage blood sugars.    5. Keep a blood sugar record and note ketone levels.     6.  You will be contacted to reschedule your gestational diabetes education appointment.    7. Call Diabetes Care or send a [employer] message if you have 3 blood sugars above target in one week.    Diabetes Care: [phone]

## 2024-05-23 ENCOUNTER — PRENATAL OFFICE VISIT (OUTPATIENT)
Dept: OBGYN | Facility: CLINIC | Age: 34
End: 2024-05-23
Payer: COMMERCIAL

## 2024-05-23 VITALS
OXYGEN SATURATION: 97 % | SYSTOLIC BLOOD PRESSURE: 115 MMHG | DIASTOLIC BLOOD PRESSURE: 76 MMHG | BODY MASS INDEX: 38.08 KG/M2 | HEART RATE: 75 BPM | WEIGHT: 208.2 LBS

## 2024-05-23 DIAGNOSIS — O99.019 MATERNAL IRON DEFICIENCY ANEMIA AFFECTING PREGNANCY, ANTEPARTUM: Primary | ICD-10-CM

## 2024-05-23 DIAGNOSIS — O36.8130 DECREASED FETAL MOVEMENTS IN THIRD TRIMESTER, SINGLE OR UNSPECIFIED FETUS: ICD-10-CM

## 2024-05-23 DIAGNOSIS — D50.9 MATERNAL IRON DEFICIENCY ANEMIA AFFECTING PREGNANCY, ANTEPARTUM: Primary | ICD-10-CM

## 2024-05-23 LAB
ERYTHROCYTE [DISTWIDTH] IN BLOOD BY AUTOMATED COUNT: 19.5 % (ref 10–15)
HCT VFR BLD AUTO: 33.9 % (ref 35–47)
HGB BLD-MCNC: 10.7 G/DL (ref 11.7–15.7)
MCH RBC QN AUTO: 26.6 PG (ref 26.5–33)
MCHC RBC AUTO-ENTMCNC: 31.6 G/DL (ref 31.5–36.5)
MCV RBC AUTO: 84 FL (ref 78–100)
PLATELET # BLD AUTO: 171 10E3/UL (ref 150–450)
RBC # BLD AUTO: 4.03 10E6/UL (ref 3.8–5.2)
WBC # BLD AUTO: 9.3 10E3/UL (ref 4–11)

## 2024-05-23 PROCEDURE — 59025 FETAL NON-STRESS TEST: CPT | Performed by: OBSTETRICS & GYNECOLOGY

## 2024-05-23 PROCEDURE — 99207 PR PRENATAL VISIT: CPT | Performed by: OBSTETRICS & GYNECOLOGY

## 2024-05-23 PROCEDURE — 85027 COMPLETE CBC AUTOMATED: CPT | Performed by: OBSTETRICS & GYNECOLOGY

## 2024-05-23 PROCEDURE — 36415 COLL VENOUS BLD VENIPUNCTURE: CPT | Performed by: OBSTETRICS & GYNECOLOGY

## 2024-05-23 NOTE — PROGRESS NOTES
Patient presents for routine prenatal visit at 35w4d.  Patient with complaint. Reports decreased fetal movement.  Seen in L&D for rule out ROM  PE: See OB vitals  There is no height or weight on file to calculate BMI.    Doing well.  No concerns today.  No vaginal bleeding, loss of fluid, or contractions  Questions asked and answered.  U/S scheduled for growth  Check Hgb    Decreased Fetal Movement  NST IS:  Reactive (2 accl > 15 BPM in 20 min., each lasting approx. 15 seconds)  NST Baseline Rate 145  Variability:  Average  Accelerations:Present  Variable Decelerations:No  Other Decelerations:No  Contractions: rare    Further Comments:      Plans:  Routine follow up with ultrasound next week.     Ravi Ashraf MD FACOG

## 2024-05-28 ENCOUNTER — ANCILLARY PROCEDURE (OUTPATIENT)
Dept: ULTRASOUND IMAGING | Facility: CLINIC | Age: 34
End: 2024-05-28
Attending: OBSTETRICS & GYNECOLOGY
Payer: COMMERCIAL

## 2024-05-28 DIAGNOSIS — O09.92 HIGH-RISK PREGNANCY IN SECOND TRIMESTER: ICD-10-CM

## 2024-05-28 PROCEDURE — 76816 OB US FOLLOW-UP PER FETUS: CPT | Mod: TC | Performed by: RADIOLOGY

## 2024-05-28 PROCEDURE — 76819 FETAL BIOPHYS PROFIL W/O NST: CPT | Mod: TC | Performed by: RADIOLOGY

## 2024-05-29 ENCOUNTER — VIRTUAL VISIT (OUTPATIENT)
Dept: EDUCATION SERVICES | Facility: CLINIC | Age: 34
End: 2024-05-29
Payer: COMMERCIAL

## 2024-05-29 ENCOUNTER — MYC MEDICAL ADVICE (OUTPATIENT)
Dept: EDUCATION SERVICES | Facility: CLINIC | Age: 34
End: 2024-05-29

## 2024-05-29 DIAGNOSIS — O24.410 DIET CONTROLLED GESTATIONAL DIABETES MELLITUS (GDM), ANTEPARTUM: Primary | ICD-10-CM

## 2024-05-29 PROCEDURE — G0108 DIAB MANAGE TRN  PER INDIV: HCPCS | Mod: 93 | Performed by: REGISTERED NURSE

## 2024-05-29 NOTE — LETTER
5/29/2024         RE: Gloria Sun  [address] [place] [address]        Dear Colleague,    Thank you for referring your patient, Gloria Sun, to the [facility]. Please see a copy of my visit note below.    Diabetes Self-Management Education & Support  Type of Service: Telephone Visit    Originating Location (Patient Location): Home  Distant Location (Provider Location): Offsite  Mode of Communication:  Telephone    Telephone Visit Start Time:  1:00 PM  Telephone Visit End Time (telephone visit stop time): 2:00 PM    How would patient like to obtain AVS? MyChart    ASSESSMENT:  Follow-up visit for gestational diabetes education counseling.  Gloria is checking her blood sugar 4 times per day.  The majority of blood sugars are within target. Any blood sugars elevations are from drinking juice to keep her blood sugar up.  She thought she needed to treat all blood sugars in the 70's with juice. This is what she learned from a RN while in ER. Gloria is eating a reduced carbohydrate diet.  She is not hungry, her weight gain is appropriate and ketones are negative.  Completed education topics today.    Blood Glucose/Ketone Log:   Date Ketones Fasting Post Breakfast Post Lunch Post Supper   5/19 Neg 77 140 136 77   5/20 Neg 80 133 135 90   5/21 Neg 90  135 140 (juice)    5/22 Neg 78  101 123 110   5/23 Neg 82 103 136 140   5/24 Neg 77 100 118 108   5/25 Neg 76 100 136 116   5/26 Neg 82 125 142 136   5/27 Neg 84 128 Skipped due to illness    5/28 Neg 79 125 114 146 (juice)   5/29 Neg 80 136         SUBJECTIVE/OBJECTIVE:  Presents for education related to gestational diabetes.    Accompanied by: Self  Diabetes management related comments/concerns: blood sugars running low  Gestational weeks: 36w3d  Next OB Visit Date: 05/30/24  Number of previous pregnancies: 2  Had any babies over 9 lbs: No  Previously had Gestational Diabetes: Yes (2017)  Had Diabetes Education before: Yes  Previous  insulin or other diabetes medication during that pregnancy: No  Have you ever had thyroid problems or taken thyroid medication?: No  Heart disease, mitral valve prolapse or rheumatic fever?: No  Hypertension : No  High Cholesterol: No  High Triglycerides: No  Do you use tobacco products?: No  Do you drink beer, wine or hard liquor?: No    Cultural Influences/Ethnic Background:  Not  or     Estimated Date of Delivery: 2024  LMP 2023 (Exact Date)       Weight gain 17 lbs at 36 weeks gestation.  Wt Readings from Last 3 Encounters:   24 94.4 kg (208 lb 3.2 oz)   24 94.1 kg (207 lb 6.4 oz)   24 92.2 kg (203 lb 3.2 oz)      Lifestyle and Health Behaviors:  Pre-pregnancy weight (lbs): 191  Exercise:: Currently not exercising (sciatic nerve issue, runs errands, goes on field trips.)  Barrier to exercise: Physical limitation  Meal planning/habits: Low carb, Smaller portions, Frequent snacking, Avoiding sweets  How many times a week on average do you eat food made away from home (restaurant/take-out)?: 2 (salad with steak or occasional pizza)  Meals include: Breakfast, Lunch, Dinner, Morning Snack, Afternoon Snack, Evening Snack  Breakfast: Herbalife shake with 12-19 g carbohydrate and 9 g protein.  Lunch: Chicken fingers, BBQ sauce, fries or salad, grapes and tea or mac and cheese  Dinner: Grilled steak and salad or hamburger, salad and sweet corn  Snacks: fruit snacks, clementines, cotton candy grapes, carrots with ranch dressing, kettle corn  Other: Has been drinking juice the past week to keep blood sugars from dropping. She learned this from a RN while in ER when blood sugar was in the 70's  Beverages: Water, Tea, Other (see Comments), Juice (herbal life tea, herbal life protein shake)  Biggest challenges to healthy eating: None  Pre- vitamin?: Yes  Experiencing nausea?: Yes (past week feeling nausious. Getting IV iron infusions. 1 treatment left.)  Experiencing  heartburn?: Yes    Healthy Coping:  Emotional response to diabetes: Ready to learn  Informal Support system:: Spouse  Stage of change: ACTION (Actively working towards change)    Current Management:  Taking medications for gestational diabetes?: No  Difficulty affording diabetes testing supplies?: No    INTERVENTION:  Educational topics covered today:  What to expect after delivery, Future testing for Type 2 diabetes (2 hour OGTT at 6 week post-partum check-up and annual fasting blood glucose level), Risk of GDM and planning ahead for future pregnancies, Recommended lifestyle interventions for reducing the risk of Type 2 Diabetes, When to Call a Diabetes Educator or OB Provider    Educational Materials provided today:  No new materials provided today    PLAN:  1.  Decrease urine ketone testing to once per week since all are negative.    2. Continue to check blood sugar 4 times per day. Before breakfast and 1 hour after meals. Add the [employer] janice to your phone to wirelessly connect data.        Blood sugar goals:       Before breakfast: 63-95      1 hour after meals: less 140      2 hours after meals: less 120    3. Continue to eat 3 meals and 3 snacks per day according to the meal plan.  Include protein and carbohydrate at meals and snacks.  Avoid drinking juice.  Breakfast: 30-45 grams of carbohydrate with protein   Lunch and dinner: 45-60 grams of carbohydrate  Snacks: 15-30 grams of carbohydrate with protein    4. Continue to stay active after meals to manage blood sugars.    5.  Send blood sugars via [employer] in 2 weeks (June 12).  You do not need to record daily food records anymore.    6. Call Diabetes Care or send a [employer] message if you have 3 blood sugars above target in one week.    Diabetes Care: [phone]     Blanche Velazco RD, LD, [facility]   Certified Diabetes Care and   [facility] Tuesdays and Thursdays  [facility] Wednesdays-virtual  visits only    Time Spent: 60 minutes  Encounter Type: Individual    Any diabetes medication dose changes were made via the CDE Protocol and Collaborative Practice Agreement with the patient's OB/GYN provider. A copy of this encounter was shared with the provider.

## 2024-05-29 NOTE — CONFIDENTIAL NOTE
I called Gloria to schedule the follow-up appt that was requested by Blanche as they were not able to complete the appt last week.I was able to find an opening with Blanche today at 1pm and scheduled a phone appt for Gloria who agreed that will work for her. I also sent her a [facility] message and added Blanche to her care team. I told her she can respond to the [facility] message with the past week or so of blood sugar results.    Nicci Aragon RN, [facility]

## 2024-05-29 NOTE — PATIENT INSTRUCTIONS
1.  Decrease urine ketone testing to once per week since all are negative.    2. Continue to check blood sugar 4 times per day. Before breakfast and 1 hour after meals. Add the [employer] janice to your phone to wirelessly connect data.        Blood sugar goals:       Before breakfast: 63-95      1 hour after meals: less 140      2 hours after meals: less 120    3. Continue to eat 3 meals and 3 snacks per day according to the meal plan.  Include protein and carbohydrate at meals and snacks.  Avoid drinking juice.  Breakfast: 30-45 grams of carbohydrate with protein   Lunch and dinner: 45-60 grams of carbohydrate  Snacks: 15-30 grams of carbohydrate with protein    4. Continue to stay active after meals to manage blood sugars.    5.  Send blood sugars via [employer] in 2 weeks (June 12).  You do not need to record daily food records anymore.    6. Call Diabetes Care or send a [employer] message if you have 3 blood sugars above target in one week.    Diabetes Care: [phone]     After Delivery: Instructions are listed in your Understanding Gestational Diabetes book on page 9.  Completed education topics today  Check blood sugar 4 - 6 times per week to be sure that the numbers have gone back to normal after baby is born. Check blood sugar before breakfast or 2 hours after the start of a meal.     Blood sugar goals are different when you are not pregnant:    Before breakfast: Less than 100  2 hours after a meal: Less than 140    If you have elevated numbers, contact your OB/GYN or primary care provider.    Have a 2-hour glucose tolerance test done at your post-partum check-up.

## 2024-05-30 ENCOUNTER — PRENATAL OFFICE VISIT (OUTPATIENT)
Dept: OBGYN | Facility: CLINIC | Age: 34
End: 2024-05-30
Payer: COMMERCIAL

## 2024-05-30 VITALS
BODY MASS INDEX: 38.23 KG/M2 | DIASTOLIC BLOOD PRESSURE: 78 MMHG | OXYGEN SATURATION: 99 % | WEIGHT: 209 LBS | HEART RATE: 69 BPM | SYSTOLIC BLOOD PRESSURE: 112 MMHG

## 2024-05-30 DIAGNOSIS — O36.8130 DECREASED FETAL MOVEMENTS IN THIRD TRIMESTER, SINGLE OR UNSPECIFIED FETUS: ICD-10-CM

## 2024-05-30 DIAGNOSIS — O09.93 HIGH-RISK PREGNANCY IN THIRD TRIMESTER: Primary | ICD-10-CM

## 2024-05-30 DIAGNOSIS — O24.410 DIET CONTROLLED GESTATIONAL DIABETES MELLITUS (GDM), ANTEPARTUM: ICD-10-CM

## 2024-05-30 PROCEDURE — 87653 STREP B DNA AMP PROBE: CPT | Performed by: OBSTETRICS & GYNECOLOGY

## 2024-05-30 PROCEDURE — 99207 PR PRENATAL VISIT: CPT | Performed by: OBSTETRICS & GYNECOLOGY

## 2024-05-30 PROCEDURE — 59025 FETAL NON-STRESS TEST: CPT | Performed by: OBSTETRICS & GYNECOLOGY

## 2024-05-30 NOTE — PROGRESS NOTES
Patient presents for routine prenatal visit at 36w4d.  Patient without complaint. Reports decreased movement  Sugars are well controlled  PE: See OB vitals  There is no height or weight on file to calculate BMI.  No vaginal bleeding, LOF, contractions.  No HA, RUQ pain, N/V, visual changes.  Cervix is FT/soft/-3.      Group B Strep was done  U/S:  2987 gms 62nd%ile      Decreased Fetal Movement  NST IS:  Reactive (2 accl > 15 BPM in 20 min., each lasting approx. 15 seconds)  NST Baseline Rate 145  Variability:  Average  Accelerations:Present  Variable Decelerations:No  Other Decelerations:No  Contractions: irregular    Further Comments:  Continue current plans    Plans:  Plan IOL at 39 weeks 6/16  Questions asked and answered.    Ravi Ashraf MD FACOG

## 2024-05-30 NOTE — PROGRESS NOTES
Diabetes Self-Management Education & Support  Type of Service: Telephone Visit    Originating Location (Patient Location): Home  Distant Location (Provider Location): Offsite  Mode of Communication:  Telephone    Telephone Visit Start Time:  1:00 PM  Telephone Visit End Time (telephone visit stop time): 2:00 PM    How would patient like to obtain AVS? Pili    ASSESSMENT:  Follow-up visit for gestational diabetes education counseling.  Gloria is checking her blood sugar 4 times per day.  The majority of blood sugars are within target. Any blood sugars elevations are from drinking juice to keep her blood sugar up.  She thought she needed to treat all blood sugars in the 70's with juice. This is what she learned from a RN while in ER. Gloria is eating a reduced carbohydrate diet.  She is not hungry, her weight gain is appropriate and ketones are negative.  Completed education topics today.    Blood Glucose/Ketone Log:   Date Ketones Fasting Post Breakfast Post Lunch Post Supper   5/19 Neg 77 140 136 77   5/20 Neg 80 133 135 90   5/21 Neg 90  135 140 (juice)    5/22 Neg 78  101 123 110   5/23 Neg 82 103 136 140   5/24 Neg 77 100 118 108   5/25 Neg 76 100 136 116   5/26 Neg 82 125 142 136   5/27 Neg 84 128 Skipped due to illness    5/28 Neg 79 125 114 146 (juice)   5/29 Neg 80 136         SUBJECTIVE/OBJECTIVE:  Presents for education related to gestational diabetes.    Accompanied by: Self  Diabetes management related comments/concerns: blood sugars running low  Gestational weeks: 36w3d  Next OB Visit Date: 05/30/24  Number of previous pregnancies: 2  Had any babies over 9 lbs: No  Previously had Gestational Diabetes: Yes (2017)  Had Diabetes Education before: Yes  Previous insulin or other diabetes medication during that pregnancy: No  Have you ever had thyroid problems or taken thyroid medication?: No  Heart disease, mitral valve prolapse or rheumatic fever?: No  Hypertension : No  High Cholesterol: No  High  Triglycerides: No  Do you use tobacco products?: No  Do you drink beer, wine or hard liquor?: No    Cultural Influences/Ethnic Background:  Not  or     Estimated Date of Delivery: 2024  LMP 2023 (Exact Date)       Weight gain 17 lbs at 36 weeks gestation.  Wt Readings from Last 3 Encounters:   24 94.4 kg (208 lb 3.2 oz)   24 94.1 kg (207 lb 6.4 oz)   24 92.2 kg (203 lb 3.2 oz)      Lifestyle and Health Behaviors:  Pre-pregnancy weight (lbs): 191  Exercise:: Currently not exercising (sciatic nerve issue, runs errands, goes on field trips.)  Barrier to exercise: Physical limitation  Meal planning/habits: Low carb, Smaller portions, Frequent snacking, Avoiding sweets  How many times a week on average do you eat food made away from home (restaurant/take-out)?: 2 (salad with steak or occasional pizza)  Meals include: Breakfast, Lunch, Dinner, Morning Snack, Afternoon Snack, Evening Snack  Breakfast: Herbalife shake with 12-19 g carbohydrate and 9 g protein.  Lunch: Chicken fingers, BBQ sauce, fries or salad, grapes and tea or mac and cheese  Dinner: Grilled steak and salad or hamburger, salad and sweet corn  Snacks: fruit snacks, clementines, cotton candy grapes, carrots with ranch dressing, kettle corn  Other: Has been drinking juice the past week to keep blood sugars from dropping. She learned this from a RN while in ER when blood sugar was in the 70's  Beverages: Water, Tea, Other (see Comments), Juice (herbal life tea, herbal life protein shake)  Biggest challenges to healthy eating: None  Pre- vitamin?: Yes  Experiencing nausea?: Yes (past week feeling nausious. Getting IV iron infusions. 1 treatment left.)  Experiencing heartburn?: Yes    Healthy Coping:  Emotional response to diabetes: Ready to learn  Informal Support system:: Spouse  Stage of change: ACTION (Actively working towards change)    Current Management:  Taking medications for gestational diabetes?:  No  Difficulty affording diabetes testing supplies?: No    INTERVENTION:  Educational topics covered today:  What to expect after delivery, Future testing for Type 2 diabetes (2 hour OGTT at 6 week post-partum check-up and annual fasting blood glucose level), Risk of GDM and planning ahead for future pregnancies, Recommended lifestyle interventions for reducing the risk of Type 2 Diabetes, When to Call a Diabetes Educator or OB Provider    Educational Materials provided today:  No new materials provided today    PLAN:  1.  Decrease urine ketone testing to once per week since all are negative.    2. Continue to check blood sugar 4 times per day. Before breakfast and 1 hour after meals. Add the [employer] janice to your phone to wirelessly connect data.        Blood sugar goals:       Before breakfast: 63-95      1 hour after meals: less 140      2 hours after meals: less 120    3. Continue to eat 3 meals and 3 snacks per day according to the meal plan.  Include protein and carbohydrate at meals and snacks.  Avoid drinking juice.  Breakfast: 30-45 grams of carbohydrate with protein   Lunch and dinner: 45-60 grams of carbohydrate  Snacks: 15-30 grams of carbohydrate with protein    4. Continue to stay active after meals to manage blood sugars.    5.  Send blood sugars via [employer] in 2 weeks (June 12).  You do not need to record daily food records anymore.    6. Call Diabetes Care or send a [employer] message if you have 3 blood sugars above target in one week.    Diabetes Care: [phone]     Blanche Velazco RD, LD, [facility]   Certified Diabetes Care and   [facility] Tuesdays and Thursdays  [facility] Wednesdays-virtual visits only    Time Spent: 60 minutes  Encounter Type: Individual    Any diabetes medication dose changes were made via the CDE Protocol and Collaborative Practice Agreement with the patient's OB/GYN provider. A copy of this encounter was shared with  the provider.

## 2024-05-31 LAB — GP B STREP DNA SPEC QL NAA+PROBE: NEGATIVE

## 2024-06-03 ENCOUNTER — INFUSION THERAPY VISIT (OUTPATIENT)
Dept: INFUSION THERAPY | Facility: CLINIC | Age: 34
End: 2024-06-03
Attending: OBSTETRICS & GYNECOLOGY
Payer: MEDICAID

## 2024-06-03 VITALS
HEART RATE: 64 BPM | SYSTOLIC BLOOD PRESSURE: 115 MMHG | DIASTOLIC BLOOD PRESSURE: 80 MMHG | OXYGEN SATURATION: 97 % | RESPIRATION RATE: 16 BRPM

## 2024-06-03 DIAGNOSIS — D50.9 MATERNAL IRON DEFICIENCY ANEMIA AFFECTING PREGNANCY, ANTEPARTUM: Primary | ICD-10-CM

## 2024-06-03 DIAGNOSIS — O99.019 MATERNAL IRON DEFICIENCY ANEMIA AFFECTING PREGNANCY, ANTEPARTUM: Primary | ICD-10-CM

## 2024-06-03 PROCEDURE — 96366 THER/PROPH/DIAG IV INF ADDON: CPT

## 2024-06-03 PROCEDURE — 96365 THER/PROPH/DIAG IV INF INIT: CPT

## 2024-06-03 PROCEDURE — 250N000011 HC RX IP 250 OP 636: Performed by: OBSTETRICS & GYNECOLOGY

## 2024-06-03 PROCEDURE — 258N000003 HC RX IP 258 OP 636: Performed by: OBSTETRICS & GYNECOLOGY

## 2024-06-03 PROCEDURE — 99207 PR NO CHARGE LOS: CPT

## 2024-06-03 RX ORDER — MEPERIDINE HYDROCHLORIDE 25 MG/ML
25 INJECTION INTRAMUSCULAR; INTRAVENOUS; SUBCUTANEOUS EVERY 30 MIN PRN
OUTPATIENT
Start: 2024-06-04

## 2024-06-03 RX ORDER — ALBUTEROL SULFATE 0.83 MG/ML
2.5 SOLUTION RESPIRATORY (INHALATION)
OUTPATIENT
Start: 2024-06-04

## 2024-06-03 RX ORDER — DIPHENHYDRAMINE HYDROCHLORIDE 50 MG/ML
50 INJECTION INTRAMUSCULAR; INTRAVENOUS
Start: 2024-06-04

## 2024-06-03 RX ORDER — HEPARIN SODIUM,PORCINE 10 UNIT/ML
5-20 VIAL (ML) INTRAVENOUS DAILY PRN
OUTPATIENT
Start: 2024-06-04

## 2024-06-03 RX ORDER — ALBUTEROL SULFATE 90 UG/1
1-2 AEROSOL, METERED RESPIRATORY (INHALATION)
Start: 2024-06-04

## 2024-06-03 RX ORDER — EPINEPHRINE 1 MG/ML
0.3 INJECTION, SOLUTION INTRAMUSCULAR; SUBCUTANEOUS EVERY 5 MIN PRN
OUTPATIENT
Start: 2024-06-04

## 2024-06-03 RX ORDER — METHYLPREDNISOLONE SODIUM SUCCINATE 125 MG/2ML
125 INJECTION, POWDER, LYOPHILIZED, FOR SOLUTION INTRAMUSCULAR; INTRAVENOUS
Start: 2024-06-04

## 2024-06-03 RX ORDER — HEPARIN SODIUM (PORCINE) LOCK FLUSH IV SOLN 100 UNIT/ML 100 UNIT/ML
5 SOLUTION INTRAVENOUS
OUTPATIENT
Start: 2024-06-04

## 2024-06-03 RX ADMIN — SODIUM CHLORIDE 250 ML: 9 INJECTION, SOLUTION INTRAVENOUS at 08:42

## 2024-06-03 RX ADMIN — IRON SUCROSE 300 MG: 20 INJECTION, SOLUTION INTRAVENOUS at 08:45

## 2024-06-03 ASSESSMENT — PAIN SCALES - GENERAL: PAINLEVEL: EXTREME PAIN (8)

## 2024-06-03 NOTE — PROGRESS NOTES
Infusion Nursing Note:  Gloria Sun presents today for Venofer 3/3.    Patient seen by provider today: No   present during visit today: Not Applicable.    Note: Patient noting increased ankle/foot swelling over the last few days.  She reports that she was on her feet a lot on Friday.  /80 today.  Instructed patient to keep a close eye on this over the next few days.  She has a follow up appt with her OB on Thursday this week.    Intravenous Access:  Peripheral IV placed.    Treatment Conditions:  Not Applicable.    Post Infusion Assessment:  Patient tolerated infusion without incident.  Blood return noted pre and post infusion.  Site patent and intact, free from redness, edema or discomfort.  No evidence of extravasations.  Access discontinued per protocol.     Discharge Plan:   Patient will return as directed for next appointment.   Future appts have been reviewed and crosschecked with appt note and plan.  Patient discharged in stable condition accompanied by: self.  Departure Mode: Ambulatory.    Traci Rae RN-BSN, PHN, OCN  [facility]

## 2024-06-04 ENCOUNTER — PRENATAL OFFICE VISIT (OUTPATIENT)
Dept: OBGYN | Facility: CLINIC | Age: 34
End: 2024-06-04
Payer: COMMERCIAL

## 2024-06-04 VITALS
DIASTOLIC BLOOD PRESSURE: 78 MMHG | WEIGHT: 211.6 LBS | SYSTOLIC BLOOD PRESSURE: 112 MMHG | HEART RATE: 81 BPM | BODY MASS INDEX: 38.7 KG/M2

## 2024-06-04 DIAGNOSIS — O24.410 DIET CONTROLLED GESTATIONAL DIABETES MELLITUS (GDM), ANTEPARTUM: ICD-10-CM

## 2024-06-04 DIAGNOSIS — O09.93 HIGH-RISK PREGNANCY IN THIRD TRIMESTER: Primary | ICD-10-CM

## 2024-06-04 PROCEDURE — 99207 PR PRENATAL VISIT: CPT | Performed by: OBSTETRICS & GYNECOLOGY

## 2024-06-04 NOTE — PROGRESS NOTES
Presents for routine  appointment.      /78   Pulse 81   Wt 96 kg (211 lb 9.6 oz)   LMP 2023 (Exact Date)   BMI 38.70 kg/m        A/P:  33 year old  at 37w2d     Pregnancy c/b:  -GDMA1, well controlled.  -Rh neg s/p rhogam  -STEVEN s/p venofer  -Hx LGA in prior pregnancy, last EFW 62%tile at 36wks  -Hx postpartum depression  -Hx sleeve gastrectomy. Serial growth ultrasounds recommended  -Hx GDM  -Gilbert's syndrome    Routine Prenatal Care:  -GBS negative  -Discussed signs and symptoms of labor, when to go to the birth center  -IOL; plan for IOL evening of 6/15 if open spot for cervical ripening. Unable to schedule today, too far out. Paperwork filled out to have staff call later this week      Follow up in 1 week    Domi Boswell DO

## 2024-06-06 NOTE — PATIENT INSTRUCTIONS
Week 38 of Your Pregnancy: Care Instructions  Believe it or not, your baby is almost here. You may notice how your baby responds to sounds, warmth, cold, and light. You may even know what kind of music your baby likes.    Even if you expect a vaginal birth, it's a good idea to learn about  section ().  is the delivery of a baby through a cut (incision) in your belly. It's a major surgery, so it has more risks than a vaginal birth.    During the first 2 weeks after the birth, limit visitors. Don't allow visitors who have colds or infections. Ask visitors to wash their hands before they touch your baby. And never let anyone smoke around your baby.    Know about unplanned C-sections.  Reasons for an unplanned  include labor that slows or stops, signs of distress in your baby, and high blood pressure or other problems for you.     Know about planned C-sections.  If your baby isn't in a head-down position for delivery (breech position), your doctor may plan a . Or you may have a planned  if you're having twins or more.     Get as much help as you can while you're in the hospital.  You can learn about feeding, diapering, and bathing your baby.     Talk to your doctor or midwife about how to care for the umbilical cord stump.  If your baby will be circumcised, also ask about how to care for that.     Ask friends or family for help, as you need it.  If you can, have another adult in your home for at least 2 or 3 days after the birth.     Try to nap when your baby naps.  This may be your best chance to get some sleep.     Watch for changes in your mental health.  For the first 1 to 2 weeks after birth, it's common to cry or feel sad or irritable. If these feelings last for more than 2 weeks, you may have postpartum depression. Ask your doctor for help. Postpartum depression can be treated.   Follow-up care is a key part of your treatment and safety. Be sure to make and go  "to all appointments, and call your doctor if you are having problems. It's also a good idea to know your test results and keep a list of the medicines you take.  Where can you learn more?  Go to https://www.[employer].net/patiented  Enter B044 in the search box to learn more about \"Week 38 of Your Pregnancy: Care Instructions.\"  Current as of: July 10, 2023               Content Version: 14.0    5526-0616 [employer]   Care instructions adapted under license by your healthcare professional. If you have questions about a medical condition or this instruction, always ask your healthcare professional. [employer] disclaims any warranty or liability for your use of this information.                                                         If you have any questions regarding your visit, Please contact your care team.     [employer] [facility]: [phone]    To Schedule an Appointment 24/7  Call: [phone] TE[facility]PHONE NUMBER     Ravi Ashraf MD  Medical Director        Sin Metz-ROCIO Walters-Surgery Scheduler  [facility]-Surgery Scheduler               Tuesday-[place]  7:30 a.m-4:30 p.m    Thursday-[place]  7:30 a.m-4:30 p.m    Typical Surgery Days: Tuesday or Friday [facility]  [address] [facility] [place] [address]  PH: [phone]    Imaging Scheduling all locations  PH: [phone]     [facility]  [address] [place] [address] [place] [address]  PH: [phone]    [facility]  [address] [place] [address]  PH: [phone] Fax      **Surgeries** Our Surgery Schedulers will contact you to schedule. If you do not receive a call within 3 business days, please call [phone].    Urgent Care locations:  [facility] Monday-Friday  10 am - 8 pm  Saturday and Sunday   9 am - 5 pm  Monday-Friday   10 am - 8 pm  Saturday and Sunday   9 am - 5 pm "   [phone]   If you need a medication refill, please contact your pharmacy. Please allow 3 business days for your refill to be completed.  As always, Thank you for trusting us with your healthcare needs!    see additional instructions from your care team below

## 2024-06-13 ENCOUNTER — PRENATAL OFFICE VISIT (OUTPATIENT)
Dept: OBGYN | Facility: CLINIC | Age: 34
End: 2024-06-13
Payer: COMMERCIAL

## 2024-06-13 VITALS
OXYGEN SATURATION: 98 % | DIASTOLIC BLOOD PRESSURE: 76 MMHG | WEIGHT: 216.4 LBS | HEART RATE: 67 BPM | SYSTOLIC BLOOD PRESSURE: 115 MMHG | BODY MASS INDEX: 39.58 KG/M2

## 2024-06-13 DIAGNOSIS — O24.410 DIET CONTROLLED GESTATIONAL DIABETES MELLITUS (GDM), ANTEPARTUM: ICD-10-CM

## 2024-06-13 DIAGNOSIS — Z34.80 SUPERVISION OF OTHER NORMAL PREGNANCY, ANTEPARTUM: Primary | ICD-10-CM

## 2024-06-13 PROCEDURE — 99207 PR PRENATAL VISIT: CPT | Performed by: OBSTETRICS & GYNECOLOGY

## 2024-06-13 NOTE — PROGRESS NOTES
Patient presents for routine prenatal visit at 38w4d.  Patient without complaint.   PE: See OB vitals  Body mass index is 39.58 kg/m .  No vaginal bleeding, LOF, contractions.  No HA, RUQ pain, N/V, visual changes.  Discussed indications, risks, complications and failure rate of inductions.  IOL scheduled 6/15  Questions asked and answered.      Ravi Ashraf MD FACOG

## 2024-06-19 ENCOUNTER — TRANSCRIBE ORDERS (OUTPATIENT)
Dept: OTHER | Age: 34
End: 2024-06-19

## 2024-07-11 ENCOUNTER — PRENATAL OFFICE VISIT (OUTPATIENT)
Dept: OBGYN | Facility: CLINIC | Age: 34
End: 2024-07-11
Payer: COMMERCIAL

## 2024-07-11 VITALS
DIASTOLIC BLOOD PRESSURE: 78 MMHG | HEIGHT: 62 IN | BODY MASS INDEX: 36.22 KG/M2 | WEIGHT: 196.8 LBS | SYSTOLIC BLOOD PRESSURE: 119 MMHG | HEART RATE: 60 BPM | OXYGEN SATURATION: 99 %

## 2024-07-11 DIAGNOSIS — Z86.32 HISTORY OF GESTATIONAL DIABETES: ICD-10-CM

## 2024-07-11 DIAGNOSIS — Z30.013 INITIATION OF DEPO PROVERA: ICD-10-CM

## 2024-07-11 PROBLEM — O24.410 DIET CONTROLLED GESTATIONAL DIABETES MELLITUS (GDM), ANTEPARTUM: Status: RESOLVED | Noted: 2024-03-18 | Resolved: 2024-07-11

## 2024-07-11 LAB — HGB BLD-MCNC: 13.6 G/DL (ref 11.7–15.7)

## 2024-07-11 PROCEDURE — 36415 COLL VENOUS BLD VENIPUNCTURE: CPT | Performed by: NURSE PRACTITIONER

## 2024-07-11 PROCEDURE — 99207 PR POST PARTUM EXAM: CPT | Performed by: NURSE PRACTITIONER

## 2024-07-11 PROCEDURE — 96372 THER/PROPH/DIAG INJ SC/IM: CPT | Performed by: NURSE PRACTITIONER

## 2024-07-11 PROCEDURE — 85018 HEMOGLOBIN: CPT | Performed by: NURSE PRACTITIONER

## 2024-07-11 RX ORDER — MEDROXYPROGESTERONE ACETATE 150 MG/ML
150 INJECTION, SUSPENSION INTRAMUSCULAR
Status: ACTIVE | OUTPATIENT
Start: 2024-07-11 | End: 2025-07-06

## 2024-07-11 RX ADMIN — MEDROXYPROGESTERONE ACETATE 150 MG: 150 INJECTION, SUSPENSION INTRAMUSCULAR at 10:47

## 2024-07-11 ASSESSMENT — PATIENT HEALTH QUESTIONNAIRE - PHQ9
SUM OF ALL RESPONSES TO PHQ QUESTIONS 1-9: 1
10. IF YOU CHECKED OFF ANY PROBLEMS, HOW DIFFICULT HAVE THESE PROBLEMS MADE IT FOR YOU TO DO YOUR WORK, TAKE CARE OF THINGS AT HOME, OR GET ALONG WITH OTHER PEOPLE: NOT DIFFICULT AT ALL
SUM OF ALL RESPONSES TO PHQ QUESTIONS 1-9: 1

## 2024-07-11 NOTE — PATIENT INSTRUCTIONS
If you have any questions regarding your visit, Please contact your care team.     [facility]: [phone]  To Schedule an Appointment 24/7  Call: [phone] TELEPHONE NUMBER     Arianna Pierre- APRN CNP      Toby Walters-Surgery Scheduler  [facility]-Surgery Scheduler         Monday 7:30am-2:00pm    Tuesday 7:30am-4:00pm    Wednesday 7:30am-2:00pm    Thursday 7:30am-11:00am    Friday 7:30am-2:00pm [address] [address]  Phone- [phone]   Fax- [phone]     Imaging Scheduling all locations  [phone]    [facility] Delivery  [address] [place] [address]   [place] [address] [phone]         Urgent Care locations:  [facility]   Monday-Friday  10 am - 8 pm  Saturday and Sunday   9 am - 5 pm     [phone]   If you need a medication refill, please contact your pharmacy. Please allow 3 business days for your refill to be completed.  As always, Thank you for trusting us with your healthcare needs!      see additional instructions from your care team below

## 2024-07-11 NOTE — PROGRESS NOTES

## 2024-08-04 ENCOUNTER — OFFICE VISIT (OUTPATIENT)
Dept: URGENT CARE | Facility: URGENT CARE | Age: 34
End: 2024-08-04
Payer: COMMERCIAL

## 2024-08-04 VITALS
OXYGEN SATURATION: 98 % | DIASTOLIC BLOOD PRESSURE: 70 MMHG | HEART RATE: 52 BPM | SYSTOLIC BLOOD PRESSURE: 114 MMHG | TEMPERATURE: 98.2 F

## 2024-08-04 DIAGNOSIS — R07.0 THROAT PAIN: Primary | ICD-10-CM

## 2024-08-04 LAB
ALBUMIN UR-MCNC: NEGATIVE MG/DL
APPEARANCE UR: CLEAR
BASOPHILS # BLD AUTO: 0 10E3/UL (ref 0–0.2)
BASOPHILS NFR BLD AUTO: 1 %
BILIRUB UR QL STRIP: NEGATIVE
COLOR UR AUTO: YELLOW
DEPRECATED S PYO AG THROAT QL EIA: NEGATIVE
EOSINOPHIL # BLD AUTO: 0.1 10E3/UL (ref 0–0.7)
EOSINOPHIL NFR BLD AUTO: 3 %
ERYTHROCYTE [DISTWIDTH] IN BLOOD BY AUTOMATED COUNT: 16.7 % (ref 10–15)
GLUCOSE UR STRIP-MCNC: NEGATIVE MG/DL
GROUP A STREP BY PCR: NOT DETECTED
HCT VFR BLD AUTO: 42.7 % (ref 35–47)
HGB BLD-MCNC: 14 G/DL (ref 11.7–15.7)
HGB UR QL STRIP: ABNORMAL
IMM GRANULOCYTES # BLD: 0 10E3/UL
IMM GRANULOCYTES NFR BLD: 0 %
KETONES UR STRIP-MCNC: NEGATIVE MG/DL
LEUKOCYTE ESTERASE UR QL STRIP: NEGATIVE
LYMPHOCYTES # BLD AUTO: 2.1 10E3/UL (ref 0.8–5.3)
LYMPHOCYTES NFR BLD AUTO: 47 %
MCH RBC QN AUTO: 28.6 PG (ref 26.5–33)
MCHC RBC AUTO-ENTMCNC: 32.8 G/DL (ref 31.5–36.5)
MCV RBC AUTO: 87 FL (ref 78–100)
MONOCYTES # BLD AUTO: 0.4 10E3/UL (ref 0–1.3)
MONOCYTES NFR BLD AUTO: 9 %
NEUTROPHILS # BLD AUTO: 1.9 10E3/UL (ref 1.6–8.3)
NEUTROPHILS NFR BLD AUTO: 41 %
NITRATE UR QL: NEGATIVE
PH UR STRIP: 6.5 [PH] (ref 5–7)
PLATELET # BLD AUTO: 192 10E3/UL (ref 150–450)
RBC # BLD AUTO: 4.89 10E6/UL (ref 3.8–5.2)
RBC #/AREA URNS AUTO: ABNORMAL /HPF
SP GR UR STRIP: 1.01 (ref 1–1.03)
SQUAMOUS #/AREA URNS AUTO: ABNORMAL /LPF
UROBILINOGEN UR STRIP-ACNC: 0.2 E.U./DL
WBC # BLD AUTO: 4.6 10E3/UL (ref 4–11)
WBC #/AREA URNS AUTO: ABNORMAL /HPF

## 2024-08-04 PROCEDURE — 36415 COLL VENOUS BLD VENIPUNCTURE: CPT | Performed by: FAMILY MEDICINE

## 2024-08-04 PROCEDURE — 99214 OFFICE O/P EST MOD 30 MIN: CPT | Performed by: FAMILY MEDICINE

## 2024-08-04 PROCEDURE — 87651 STREP A DNA AMP PROBE: CPT | Performed by: FAMILY MEDICINE

## 2024-08-04 PROCEDURE — 81001 URINALYSIS AUTO W/SCOPE: CPT | Performed by: FAMILY MEDICINE

## 2024-08-04 PROCEDURE — 85025 COMPLETE CBC W/AUTO DIFF WBC: CPT | Performed by: FAMILY MEDICINE

## 2024-08-04 ASSESSMENT — PAIN SCALES - GENERAL: PAINLEVEL: NO PAIN (0)

## 2024-08-04 NOTE — PROGRESS NOTES
Assessment & Plan     (R07.0) Throat pain  (primary encounter diagnosis)  Comment:   Plan: Streptococcus A Rapid Screen w/Reflex to PCR -         Clinic Collect, Group A Streptococcus PCR         Throat Swab, UA Macroscopic with reflex to         Microscopic and Culture - Lab Collect, CBC with        platelets and differential, UA Microscopic with        Reflex to Culture          Patient has a negative rapid strep.  She had a normal CBC, negative UA.  She feels more tired, fatigue.  Advised with supportive care, increase fluid intake, try to sleep as much as she can.  Advised patient to follow-up with her primary care physician in the next week or so or sooner if she continues to have any other symptoms       Subjective     Gloria is a 33 year old female who presents to clinic today for the following health issues:  Chief Complaint   Patient presents with    Urgent Care    Throat Problem     Started few days ago, pt had her baby 7 weeks ago, feeling ill no fever or chills    Headache    Fatigue     Patient reports she has been having sore throat for the past a few days or so.  She has no fever.  She does report she has been having more bruising, she feels more tired and fatigued.  Status post 7 weeks ago, spontaneous normal vaginal delivery with no complications.  She reports she started having her monthly cycles recently,  She is bottlefeeding.    No sick contact, no cough, no wheezing, no short of breath.  No nausea no vomiting no diarrhea.  No frequency no urgency, no blood in urine no pain with urination.      Review of Systems  Constitutional, HEENT, cardiovascular, pulmonary, GI, , musculoskeletal, neuro, skin, endocrine and psych systems are negative, except as otherwise noted.      Objective    /70   Pulse 52   Temp 98.2  F (36.8  C) (Tympanic)   SpO2 98%   Physical Exam   GENERAL: alert and no distress  HENT: ear canals and TM's normal, nose and mouth without ulcers or lesions  NECK: no  adenopathy, no asymmetry, masses, or scars  RESP: lungs clear to auscultation - no rales, rhonchi or wheezes  CV: regular rate and rhythm, normal S1 S2, no S3 or S4, no murmur, click or rub, no peripheral edema  ABDOMEN: soft, nontender, no hepatosplenomegaly, no masses and bowel sounds normal  MS: no gross musculoskeletal defects noted, no edema  BACK: no CVA tenderness, no paralumbar tenderness  PSYCH: mentation appears normal, affect normal/bright    Orders Placed This Encounter   Procedures    UA Macroscopic with reflex to Microscopic and Culture - Lab Collect    Streptococcus A Rapid Screen w/Reflex to PCR - Clinic Collect    Group A Streptococcus PCR Throat Swab    CBC with platelets and differential    CBC RESULTS:   Recent Labs   Lab Test 08/04/24  1028   WBC 4.6   RBC 4.89   HGB 14.0   HCT 42.7   MCV 87   MCH 28.6   MCHC 32.8   RDW 16.7*        UA RESULTS:  Recent Labs   Lab Test 08/04/24  1035   COLOR Yellow   APPEARANCE Clear   URINEGLC Negative   URINEBILI Negative   URINEKETONE Negative   SG 1.015   UBLD Moderate*   URINEPH 6.5   PROTEIN Negative   UROBILINOGEN 0.2   NITRITE Negative   LEUKEST Negative   RBCU 5-10*   WBCU 0-5       Negative for Rapid strep.

## 2024-09-01 ENCOUNTER — OFFICE VISIT (OUTPATIENT)
Dept: URGENT CARE | Facility: URGENT CARE | Age: 34
End: 2024-09-01
Payer: COMMERCIAL

## 2024-09-01 VITALS
DIASTOLIC BLOOD PRESSURE: 52 MMHG | HEART RATE: 63 BPM | SYSTOLIC BLOOD PRESSURE: 106 MMHG | OXYGEN SATURATION: 97 % | RESPIRATION RATE: 18 BRPM | TEMPERATURE: 97.5 F

## 2024-09-01 DIAGNOSIS — R06.02 SHORTNESS OF BREATH: ICD-10-CM

## 2024-09-01 DIAGNOSIS — J45.21 MILD INTERMITTENT ASTHMA WITH ACUTE EXACERBATION: ICD-10-CM

## 2024-09-01 DIAGNOSIS — Z20.822 EXPOSURE TO 2019 NOVEL CORONAVIRUS: Primary | ICD-10-CM

## 2024-09-01 PROCEDURE — 87635 SARS-COV-2 COVID-19 AMP PRB: CPT | Performed by: PHYSICIAN ASSISTANT

## 2024-09-01 PROCEDURE — 99213 OFFICE O/P EST LOW 20 MIN: CPT | Performed by: PHYSICIAN ASSISTANT

## 2024-09-01 RX ORDER — ALBUTEROL SULFATE 0.83 MG/ML
2.5 SOLUTION RESPIRATORY (INHALATION) EVERY 6 HOURS PRN
Qty: 90 ML | Refills: 0 | Status: SHIPPED | OUTPATIENT
Start: 2024-09-01

## 2024-09-01 RX ORDER — PREDNISONE 20 MG/1
40 TABLET ORAL DAILY
Qty: 10 TABLET | Refills: 0 | Status: SHIPPED | OUTPATIENT
Start: 2024-09-01 | End: 2024-09-06

## 2024-09-01 RX ORDER — ALBUTEROL SULFATE 90 UG/1
2 AEROSOL, METERED RESPIRATORY (INHALATION) EVERY 6 HOURS PRN
Qty: 18 G | Refills: 0 | Status: SHIPPED | OUTPATIENT
Start: 2024-09-01

## 2024-09-01 ASSESSMENT — ENCOUNTER SYMPTOMS
VOMITING: 0
MUSCULOSKELETAL NEGATIVE: 1
DIARRHEA: 0
RHINORRHEA: 0
ENDOCRINE NEGATIVE: 1
NECK STIFFNESS: 0
EYES NEGATIVE: 1
ALLERGIC/IMMUNOLOGIC NEGATIVE: 1
CHILLS: 0
CARDIOVASCULAR NEGATIVE: 1
SORE THROAT: 0
BACK PAIN: 0
DIZZINESS: 0
MYALGIAS: 0
FEVER: 0
PALPITATIONS: 0
SHORTNESS OF BREATH: 1
WEAKNESS: 0
NECK PAIN: 0
WOUND: 0
ARTHRALGIAS: 0
JOINT SWELLING: 0
COUGH: 1
NAUSEA: 0
HEADACHES: 0
LIGHT-HEADEDNESS: 0

## 2024-09-01 ASSESSMENT — PAIN SCALES - GENERAL: PAINLEVEL: SEVERE PAIN (7)

## 2024-09-01 ASSESSMENT — ASTHMA QUESTIONNAIRES: ACT_TOTALSCORE: 22

## 2024-09-01 NOTE — PROGRESS NOTES
Chief Complaint:     Chief Complaint   Patient presents with    Pain     Behind ear, headache     Nasal Congestion     Bad body aches -  tested positive for COVID last week     Shortness of Breath     Hx of asthma, cannot find neb machine - shortness of breath       No results found for any visits on 09/01/24.    Medical Decision Making:    Vital signs reviewed by Michael Louie PA-C  /52 (BP Location: Left arm, Patient Position: Sitting, Cuff Size: Adult Large)   Pulse 63   Temp 97.5  F (36.4  C) (Tympanic)   Resp 18   SpO2 97%     Differential Diagnosis:  URI Adult/Peds:  Asthma exacerbation, Bronchitis-viral, Influenza, Pneumonia, and Viral upper respiratory illness        ASSESSMENT    1. Exposure to 2019 novel coronavirus    2. Mild intermittent asthma with acute exacerbation    3. Shortness of breath        PLAN    Patient is in no acute distress.    Temp is 97.5 in clinic today, lung sounds were clear, and O2 sats at 97% on RA.    Asthma is not well controlled at this time.  ACT filled out in clinic today.  Patient can not find her Neb compressor.  Patient given new neb compressor for home use.  Rx for Albuterol neb solution and inhaler sent in.  Rx for Prednisone.    COVID swab collected in clinic today.  Rest, Push fluids, vaporizer, elevation of head of bed.  Ibuprofen and or Tylenol for any fever or body aches.  Over the counter cough suppressant- PRN- as discussed.   If symptoms worsen, recheck immediately otherwise follow up with your PCP in 1 week if symptoms are not improving.  Worrisome symptoms discussed with instructions to go to the ED.  Patient verbalized understanding and agreed with this plan.    Labs:    No results found for any visits on 09/01/24.     Vital signs reviewed by Michael Louie PA-C  /52 (BP Location: Left arm, Patient Position: Sitting, Cuff Size: Adult Large)   Pulse 63   Temp 97.5  F (36.4  C) (Tympanic)   Resp 18   SpO2 97%     Current  Meds      Current Outpatient Medications:     albuterol (PROAIR HFA/PROVENTIL HFA/VENTOLIN HFA) 108 (90 Base) MCG/ACT inhaler, Inhale 2 puffs into the lungs every 6 hours as needed for shortness of breath, wheezing or cough., Disp: 18 g, Rfl: 0    albuterol (PROVENTIL) (2.5 MG/3ML) 0.083% neb solution, Take 1 vial (2.5 mg) by nebulization every 6 hours as needed for shortness of breath, wheezing or cough., Disp: 90 mL, Rfl: 0    predniSONE (DELTASONE) 20 MG tablet, Take 2 tablets (40 mg) by mouth daily for 5 days., Disp: 10 tablet, Rfl: 0    acetone urine (KETOSTIX) test strip, Use to check ketones once daily (Patient not taking: Reported on 7/11/2024), Disp: 50 strip, Rfl: 1    blood glucose (NO BRAND SPECIFIED) test strip, Use to test blood sugar 4 times daily or as directed. To accompany: Blood Glucose Monitor Brands: per insurance. (Patient not taking: Reported on 7/11/2024), Disp: 200 strip, Rfl: 6    blood glucose monitoring (NO BRAND SPECIFIED) meter device kit, Use to test blood sugar 4 times daily or as directed. Preferred blood glucose meter OR supplies to accompany: Blood Glucose Monitor Brands: per insurance. (Patient not taking: Reported on 7/11/2024), Disp: 1 kit, Rfl: 0    ferrous sulfate (FE TABS) 325 (65 Fe) MG EC tablet, Take 1 tablet (325 mg) by mouth daily (Patient not taking: Reported on 4/24/2024), Disp: 30 tablet, Rfl: 3    ondansetron (ZOFRAN ODT) 4 MG ODT tab, Place 4 mg under the tongue (Patient not taking: Reported on 4/24/2024), Disp: , Rfl:     Prenatal Vit-Fe Fumarate-FA (PRENATAL VITAMIN PO), , Disp: , Rfl:     thin (NO BRAND SPECIFIED) lancets, Use with lanceting device. To accompany: Blood Glucose Monitor Brands: per insurance. (Patient not taking: Reported on 7/11/2024), Disp: 100 each, Rfl: 6    Current Facility-Administered Medications:     medroxyPROGESTERone (DEPO-PROVERA) injection 150 mg, 150 mg, Intramuscular, Q90 Days, , 150 mg at 07/11/24 1047      Respiratory  History    occasional episodes of bronchitis and asthma      SUBJECTIVE    HPI: Gloria Lozano is an 33 year old female who presents with chest congestion, cough nonproductive, occasional, ear pain bilateral, and shortness of breath with activity.  Symptoms began 2  days ago and has unchanged.  There is no chest pain.  Patient is eating and drinking well.  No fever, nausea, vomiting, or diarrhea.    Patient denies any recent travel.  Patient was exposed to known COVID positive tested individual at home.      ROS:     Review of Systems   Constitutional:  Negative for chills and fever.   HENT:  Positive for congestion and ear pain. Negative for rhinorrhea and sore throat.    Eyes: Negative.    Respiratory:  Positive for cough and shortness of breath.    Cardiovascular: Negative.  Negative for chest pain and palpitations.   Gastrointestinal:  Negative for diarrhea, nausea and vomiting.   Endocrine: Negative.    Genitourinary: Negative.    Musculoskeletal: Negative.  Negative for arthralgias, back pain, joint swelling, myalgias, neck pain and neck stiffness.   Skin: Negative.  Negative for rash and wound.   Allergic/Immunologic: Negative.  Negative for immunocompromised state.   Neurological:  Negative for dizziness, weakness, light-headedness and headaches.         Family History   Family History   Problem Relation Age of Onset    Lipids Mother     Anxiety Disorder Mother     Depression Mother     Hypertension Mother     Obesity Mother     Cancer Father         skin    Diabetes Father     Hypertension Father     Obesity Father     Hypertension Maternal Grandmother     C.A.D. Maternal Grandmother         CABG    Lipids Maternal Grandmother     Depression Maternal Grandmother     Obesity Maternal Grandmother     C.A.D. Maternal Grandfather         CABG, MI     Asthma Maternal Grandfather     Cancer Maternal Grandfather     Respiratory Maternal Grandfather     Lipids Maternal Grandfather     Hypertension Maternal  Grandfather     Alzheimer Disease Maternal Grandfather     Depression Maternal Grandfather     Obesity Maternal Grandfather     Cerebrovascular Disease Paternal Grandmother     Arthritis Paternal Grandmother         d. lupus    Obesity Paternal Grandmother     Heart Disease Paternal Grandfather     Lipids Paternal Grandfather     Thyroid Disease No family hx of     Glaucoma No family hx of     Macular Degeneration No family hx of         Problem history  Patient Active Problem List   Diagnosis    CARDIOVASCULAR SCREENING; LDL GOAL LESS THAN 160    Chronic jaw pain    Status post gastric banding    Intermittent asthma, uncomplicated    Supervision of other normal pregnancy, antepartum    Cervical high risk HPV (human papillomavirus) test positive    Morbid obesity (H)    Major depressive disorder, recurrent episode, mild (H24)    Migraines    Need for rhogam due to Rh negative mother    Osteitis pubis (H)    Maternal iron deficiency anemia affecting pregnancy, antepartum    High-risk pregnancy in third trimester        Allergies  Allergies   Allergen Reactions    Dust Mites     Fluoxetine      Irritable, easy bruising    Morphine And Codeine Nausea and Vomiting    Venlafaxine      Agitation, anxiety         Social History  Social History     Socioeconomic History    Marital status:      Spouse name: Not on file    Number of children: 1    Years of education: 14    Highest education level: Not on file   Occupational History    Occupation: PCA      Employer: OTHER   Tobacco Use    Smoking status: Never    Smokeless tobacco: Never   Vaping Use    Vaping status: Never Used   Substance and Sexual Activity    Alcohol use: No     Alcohol/week: 0.0 standard drinks of alcohol    Drug use: No    Sexual activity: Yes     Partners: Male   Other Topics Concern    Parent/sibling w/ CABG, MI or angioplasty before 65F 55M? Not Asked     Service No    Blood Transfusions No    Caffeine Concern No    Occupational  Exposure No    Hobby Hazards No    Sleep Concern Yes    Stress Concern Yes    Weight Concern Yes    Special Diet No    Back Care Yes    Exercise Yes    Bike Helmet No    Seat Belt Yes    Self-Exams Yes     Comment: doctor   Social History Narrative    Lives with her mom at home.             Social Determinants of Health     Financial Resource Strain: Not on file   Food Insecurity: No Food Insecurity (6/15/2024)    Received from [facility]     Hunger Vital Sign     Worried About Running Out of Food in the Last Year: Never true     Ran Out of Food in the Last Year: Never true   Transportation Needs: No Transportation Needs (6/15/2024)    Received from [facility]     PRAPARE - Transportation     Lack of Transportation (Medical): No     Lack of Transportation (Non-Medical): No   Physical Activity: Not on file   Stress: Not on file   Social Connections: Not on file   Interpersonal Safety: Not At Risk (6/16/2024)    Received from [facility]     Humiliation, Afraid, Rape, and Kick questionnaire     Fear of Current or Ex-Partner: No     Emotionally Abused: No     Physically Abused: No     Sexually Abused: No   Housing Stability: Low Risk  (6/15/2024)    Received from [facility]     Housing Stability Vital Sign     Unable to Pay for Housing in the Last Year: No     Number of Times Moved in the Last Year: 0     Homeless in the Last Year: No        OBJECTIVE     Vital signs reviewed by Michael Louie PA-C  /52 (BP Location: Left arm, Patient Position: Sitting, Cuff Size: Adult Large)   Pulse 63   Temp 97.5  F (36.4  C) (Tympanic)   Resp 18   SpO2 97%      Physical Exam  Vitals and nursing note reviewed.   Constitutional:       General: She is not in acute distress.     Appearance: She is well-developed. She is not ill-appearing, toxic-appearing or diaphoretic.   HENT:      Head: Normocephalic and atraumatic.      Right Ear: Hearing, tympanic membrane, ear canal and  external ear normal. Tympanic membrane is not perforated, erythematous, retracted or bulging.      Left Ear: Hearing, tympanic membrane, ear canal and external ear normal. Tympanic membrane is not perforated, erythematous, retracted or bulging.      Nose: Congestion present. No mucosal edema or rhinorrhea.      Right Sinus: No maxillary sinus tenderness or frontal sinus tenderness.      Left Sinus: No maxillary sinus tenderness or frontal sinus tenderness.      Mouth/Throat:      Pharynx: No pharyngeal swelling, oropharyngeal exudate, posterior oropharyngeal erythema or uvula swelling.      Tonsils: No tonsillar exudate or tonsillar abscesses. 0 on the right. 0 on the left.   Eyes:      General:         Right eye: No discharge.         Left eye: No discharge.      Pupils: Pupils are equal, round, and reactive to light.   Cardiovascular:      Rate and Rhythm: Normal rate and regular rhythm.      Heart sounds: Normal heart sounds. No murmur heard.     No friction rub. No gallop.   Pulmonary:      Effort: Pulmonary effort is normal. No respiratory distress.      Breath sounds: Normal breath sounds. No decreased breath sounds, wheezing, rhonchi or rales.   Chest:      Chest wall: No tenderness.   Abdominal:      General: Bowel sounds are normal. There is no distension.      Palpations: Abdomen is soft. There is no mass.      Tenderness: There is no abdominal tenderness. There is no guarding.   Musculoskeletal:      Cervical back: Normal range of motion and neck supple.   Lymphadenopathy:      Head:      Right side of head: No submental, submandibular, tonsillar, preauricular or posterior auricular adenopathy.      Left side of head: No submental, submandibular, tonsillar, preauricular or posterior auricular adenopathy.      Cervical: No cervical adenopathy.      Right cervical: No superficial or posterior cervical adenopathy.     Left cervical: No superficial or posterior cervical adenopathy.   Skin:     General: Skin  is warm and dry.      Findings: No rash.   Neurological:      Mental Status: She is alert and oriented to person, place, and time.      Cranial Nerves: No cranial nerve deficit.      Deep Tendon Reflexes: Reflexes are normal and symmetric.   Psychiatric:         Behavior: Behavior normal. Behavior is cooperative.         Thought Content: Thought content normal.         Judgment: Judgment normal.           Michael Louie PA-C  9/1/2024, 9:59 AM

## 2024-09-02 LAB — SARS-COV-2 RNA RESP QL NAA+PROBE: POSITIVE

## 2024-10-13 ENCOUNTER — OFFICE VISIT (OUTPATIENT)
Dept: URGENT CARE | Facility: URGENT CARE | Age: 34
End: 2024-10-13
Payer: COMMERCIAL

## 2024-10-13 VITALS
HEART RATE: 59 BPM | BODY MASS INDEX: 39.03 KG/M2 | SYSTOLIC BLOOD PRESSURE: 121 MMHG | RESPIRATION RATE: 16 BRPM | TEMPERATURE: 97 F | OXYGEN SATURATION: 100 % | WEIGHT: 213.4 LBS | DIASTOLIC BLOOD PRESSURE: 81 MMHG

## 2024-10-13 DIAGNOSIS — J01.00 ACUTE NON-RECURRENT MAXILLARY SINUSITIS: Primary | ICD-10-CM

## 2024-10-13 DIAGNOSIS — H10.13 ALLERGIC CONJUNCTIVITIS, BILATERAL: ICD-10-CM

## 2024-10-13 PROCEDURE — 99213 OFFICE O/P EST LOW 20 MIN: CPT

## 2024-10-13 RX ORDER — OLOPATADINE HYDROCHLORIDE 2 MG/ML
1 SOLUTION/ DROPS OPHTHALMIC DAILY
Qty: 2.5 ML | Refills: 4 | Status: SHIPPED | OUTPATIENT
Start: 2024-10-13 | End: 2024-10-29

## 2024-10-13 ASSESSMENT — PAIN SCALES - GENERAL: PAINLEVEL: NO PAIN (0)

## 2024-10-13 NOTE — PROGRESS NOTES
ASSESSMENT:  (J01.00) Acute non-recurrent maxillary sinusitis  (primary encounter diagnosis)  Plan: amoxicillin-clavulanate (AUGMENTIN) 875-125 MG         tablet    (H10.13) Allergic conjunctivitis, bilateral  Plan: olopatadine (PATADAY) 0.2 % ophthalmic solution    PLAN:  Acute sinusitis patient instructions discussed and provided.  We discussed the need to take the antibiotic as prescribed and finish the full course even if symptoms improve.  We also discussed trying yogurt with active cultures or probiotic such as Culturelle daily to help find diarrhea while taking the antibiotic.  Informed the patient to use nasal saline spray, humidifier/steam and/or [place] pot for the nasal symptoms.  Discussed the need to continue taking Allegra and use the eyedrops as prescribed.  Informed the patient to follow-up with their primary care provider should symptoms persist.  Patient acknowledged their understanding of the above plan.    The use of Dragon/[employer] dictation services may have been used to construct the content in this note; any grammatical or spelling errors are non-intentional. Please contact the author of this note directly if you are in need of any clarification.      SUBJECTIVE:   Gloria Lozano is a 34 year old female presenting with a chief complaint of runny nose, stuffy nose, slight cough and red/swollen/heaviness in eyes.  Onset of symptoms was 3 day(s) ago.  Course of illness is same.    Patient denies: ear pain, sore throat, vomiting, and diarrhea  Treatment measures tried include Allegra  Predisposing factors include just bought a dog three weeks ago and had some mild allergy symptoms but the above symptoms have been worse over the past three days.    ROS:  Negative except noted above.    OBJECTIVE:  /81 (BP Location: Left arm, Patient Position: Sitting, Cuff Size: Adult Large)   Pulse 59   Temp 97  F (36.1  C) (Tympanic)   Resp 16   Wt 96.8 kg (213 lb 6.4 oz)   LMP 10/11/2024  (Approximate)   SpO2 100%   BMI 39.03 kg/m    GENERAL APPEARANCE: healthy, alert and no distress  EYES: PERRL and bilateral mild conjunctival injection with mild upper/lower eyelid edema  HENT: nasal turbinates erythematous, swollen, frontal sinus tenderness , and maxillary sinus tenderness   NECK: supple, nontender, no lymphadenopathy  RESP: lungs clear to auscultation - no rales, rhonchi or wheezes  CV: regular rates and rhythm, normal S1 S2, no murmur noted  SKIN: no suspicious lesions or rashes

## 2024-10-13 NOTE — PATIENT INSTRUCTIONS
Take the antibiotic as prescribed and finish the full course even if symptoms improve.  Try yogurt with active cultures or probiotics such as Culturelle daily to help prevent diarrhea while using antibiotics.  Continue taking Allegra daily.  Use the eyedrops for your eye symptoms.  Use nasal saline spray, [place] pot and/or humidifier/steam for your nasal congestion.  Follow-up with your primary care provider should symptoms persist.

## 2024-10-22 ENCOUNTER — ALLIED HEALTH/NURSE VISIT (OUTPATIENT)
Dept: FAMILY MEDICINE | Facility: OTHER | Age: 34
End: 2024-10-22
Payer: COMMERCIAL

## 2024-10-22 DIAGNOSIS — Z23 NEED FOR PROPHYLACTIC VACCINATION AND INOCULATION AGAINST INFLUENZA: Primary | ICD-10-CM

## 2024-10-22 PROCEDURE — 90471 IMMUNIZATION ADMIN: CPT

## 2024-10-22 PROCEDURE — 90656 IIV3 VACC NO PRSV 0.5 ML IM: CPT

## 2024-10-28 ENCOUNTER — TELEPHONE (OUTPATIENT)
Dept: FAMILY MEDICINE | Facility: OTHER | Age: 34
End: 2024-10-28

## 2024-10-28 ENCOUNTER — MEDICAL CORRESPONDENCE (OUTPATIENT)
Dept: HEALTH INFORMATION MANAGEMENT | Facility: CLINIC | Age: 34
End: 2024-10-28

## 2024-10-28 NOTE — TELEPHONE ENCOUNTER
She needs actual evaluation of her back before any referrals to a spine specialist are required.  She needs a F2F visit to have an exam done, maybe some imaging, and then potentially referrals.     Terese Dumont PA-C

## 2024-10-28 NOTE — TELEPHONE ENCOUNTER
Called and spoke to patient, relayed providers message, appointment scheduled for tomorrow at 8am with Gail Hutchinson MA on 10/28/2024 at 9:32 AM

## 2024-10-28 NOTE — TELEPHONE ENCOUNTER
I called and spoke to patient regarding needing to be seen in clinic. Per patient she is wanting a referral to a nerve specialist as patient knows provider does not treat nerve damage.     Please advise if patient can have a referral or if still needs to be seen in clinic, providers schedule is booked all week     Aleena Hutchinson MA on 10/28/2024 at 8:52 AM

## 2024-10-29 ENCOUNTER — OFFICE VISIT (OUTPATIENT)
Dept: FAMILY MEDICINE | Facility: OTHER | Age: 34
End: 2024-10-29
Payer: COMMERCIAL

## 2024-10-29 VITALS
OXYGEN SATURATION: 99 % | DIASTOLIC BLOOD PRESSURE: 70 MMHG | TEMPERATURE: 97.9 F | RESPIRATION RATE: 16 BRPM | HEART RATE: 59 BPM | BODY MASS INDEX: 39.32 KG/M2 | WEIGHT: 215 LBS | SYSTOLIC BLOOD PRESSURE: 118 MMHG

## 2024-10-29 DIAGNOSIS — M54.6 ACUTE MIDLINE THORACIC BACK PAIN: Primary | ICD-10-CM

## 2024-10-29 DIAGNOSIS — Z98.890 HISTORY OF EPIDURAL ANESTHESIA: ICD-10-CM

## 2024-10-29 DIAGNOSIS — M54.50 ACUTE MIDLINE LOW BACK PAIN WITHOUT SCIATICA: ICD-10-CM

## 2024-10-29 PROBLEM — K85.00 IDIOPATHIC ACUTE PANCREATITIS WITHOUT INFECTION OR NECROSIS: Status: ACTIVE | Noted: 2017-07-29

## 2024-10-29 PROCEDURE — 99214 OFFICE O/P EST MOD 30 MIN: CPT

## 2024-10-29 ASSESSMENT — ENCOUNTER SYMPTOMS: BACK PAIN: 1

## 2024-10-29 ASSESSMENT — PAIN SCALES - GENERAL: PAINLEVEL_OUTOF10: EXTREME PAIN (9)

## 2024-10-29 NOTE — PATIENT INSTRUCTIONS
Given the ongoing back pain and history of traumatic insertion of epidural catheter during recent delivery in June, I have ordered an MRI of your mid and low back for further evaluation. Please call [phone] to schedule your imaging study.     I would like you to give a good attempt (4+ weeks) with conservative management including ice/heat, stretches, and as needed acetaminophen (Tylenol) or ibuprofen (Advil) for pain relief. I have placed a referral to physical therapy for further evaluation of your ongoing symptoms. If despite the above conservative management, I placed a referral to neurosurgery for further evaluation.

## 2024-10-29 NOTE — PROGRESS NOTES
Assessment & Plan     Acute midline thoracic back pain  Acute midline low back pain without sciatica  History of epidural anesthesia  Patient is a 34 year-old female with mild intermittent asthma and obesity presenting with concerns of ongoing mid thoracic and lumbar back pain that has been progressive since epidural placement and removal following delivery in June. Plan for MRI of thoracic and lumbar spine given duration of symptoms and history of epidural placement. Discussed conservative management including as needed acetaminophen/ibuprofen, ice/heat, gentle stretches, and topical analgesics. Referred to physical therapy for further evaluation. Recommended evaluation and good attempt at conservative management prior to being evaluated by neurosurgery. Follow-up if symptoms fail to improve despite above interventions. Patient understands and is agreeable to plan as discussed in clinic.  - Physical Therapy  Referral; Future  - MR Thoracic Spine w/o Contrast; Future  - MR Lumbar Spine w/o Contrast; Future  - Adult Neurosurgery  Referral; Future        Subjective   Gloria is a 34 year old, presenting for the following health issues:  Back Pain      10/29/2024     8:02 AM   Additional Questions   Roomed by Hansa AGUILERA     History of Present Illness       Reason for visit:  Issuws from  epidural   She is taking medications regularly.     Pain History:  When did you first notice your pain? June after her epidural   Have you seen this provider for your pain in the past? No   Where in your body do your have pain? Middle right side of back  Are you seeing anyone else for your pain? No  What makes your pain better? Heat  What makes your pain worse? Walking  How has pain affected your ability to work? 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 7 people    Presents with ongoing right mid back pain since June. Delivered child  in June and had epidural place. States that epidural placement was slightly traumatic with multiple attempts (4 per patient) to place epidural catheter. Pain has been progressive since delivery. Back pain is midline thoracic and radiated to the right aspect of thoracic spine. Now experiencing lumbar back pain in addition to thoracic. Endorses some intermittent numbness/tingling in bilateral lower extremities and weakness. Unable to tolerate previously tolerated physical activity due to symptoms.     Denies red flag symptoms of cauda equina syndrome including loss of bowel/bladder control or saddle paresthesia.       Objective    /70   Pulse 59   Temp 97.9  F (36.6  C) (Temporal)   Resp 16   Wt 97.5 kg (215 lb)   LMP 10/11/2024 (Approximate)   SpO2 99%   BMI 39.32 kg/m    Body mass index is 39.32 kg/m .  Physical Exam  Vitals reviewed.   Constitutional:       General: She is not in acute distress.     Appearance: Normal appearance. She is not ill-appearing.   HENT:      Head: Normocephalic and atraumatic.      Mouth/Throat:      Mouth: Mucous membranes are moist.      Pharynx: Oropharynx is clear. No oropharyngeal exudate or posterior oropharyngeal erythema.   Cardiovascular:      Rate and Rhythm: Normal rate and regular rhythm.      Heart sounds: Normal heart sounds, S1 normal and S2 normal. No murmur heard.  Pulmonary:      Effort: Pulmonary effort is normal.      Breath sounds: Normal breath sounds. No wheezing.      Comments: Negative for adventitious breath sounds in all lung fields.  Musculoskeletal:      Cervical back: Normal.      Thoracic back: Tenderness present. No swelling, deformity or lacerations. Normal range of motion.      Lumbar back: Tenderness (To midline and bilateral paraspinal muscles to palpation.) present. Normal range of motion. Negative right straight leg raise test and negative left straight leg raise test.        Back:    Skin:     General: Skin is warm and dry.       Capillary Refill: Capillary refill takes less than 2 seconds.      Findings: No lesion or rash.   Neurological:      Mental Status: She is alert and oriented to person, place, and time.      Sensory: Sensation is intact. No sensory deficit.      Motor: Motor function is intact. No weakness.      Gait: Gait is intact.   Psychiatric:         Attention and Perception: Attention and perception normal.         Mood and Affect: Mood and affect normal.        MRI pending        Signed Electronically by: OSEAS Johnson CNP

## 2024-10-31 NOTE — TELEPHONE ENCOUNTER
SPINE PATIENTS - NEW PROTOCOL PREVISIT    RECORDS RECEIVED FROM: Internal    REASON FOR VISIT: Acute midline low back pain, unspecified whether sciatica present   PROVIDER: Elena Payton PA-C   DATE OF APPT: 12/2/24 @ 8:00 am    NOTES (FOR ALL VISITS) STATUS DETAILS   OFFICE NOTE from referring provider Internal 10/29/24 Gail Ray APRN CNP @[facility]     MEDICATION LIST Internal    IMAGING  (FOR ALL VISITS)     MRI (HEAD, NECK, SPINE) In process [facility]-Scheduled   11/15/24 MR Lumbar Spine  111/15/24 MR Thoracic Spine

## 2024-11-15 ENCOUNTER — HOSPITAL ENCOUNTER (OUTPATIENT)
Dept: MRI IMAGING | Facility: HOSPITAL | Age: 34
Discharge: HOME | End: 2024-11-15
Payer: COMMERCIAL

## 2024-11-15 DIAGNOSIS — M54.50 ACUTE MIDLINE LOW BACK PAIN WITHOUT SCIATICA: ICD-10-CM

## 2024-11-15 DIAGNOSIS — Z98.890 HISTORY OF EPIDURAL ANESTHESIA: ICD-10-CM

## 2024-11-15 DIAGNOSIS — M54.6 ACUTE MIDLINE THORACIC BACK PAIN: ICD-10-CM

## 2024-11-15 PROCEDURE — 72146 MRI CHEST SPINE W/O DYE: CPT

## 2024-11-15 PROCEDURE — 72148 MRI LUMBAR SPINE W/O DYE: CPT

## 2024-11-17 ENCOUNTER — HEALTH MAINTENANCE LETTER (OUTPATIENT)
Age: 34
End: 2024-11-17

## 2024-12-02 ENCOUNTER — PRE VISIT (OUTPATIENT)
Dept: NEUROSURGERY | Facility: CLINIC | Age: 34
End: 2024-12-02

## 2024-12-10 ENCOUNTER — MYC MEDICAL ADVICE (OUTPATIENT)
Dept: OBGYN | Facility: CLINIC | Age: 34
End: 2024-12-10
Payer: COMMERCIAL

## 2024-12-10 NOTE — TELEPHONE ENCOUNTER
Patient had requested E-visit to change contraception and video visit was recommended. Unable to respond to patient's follow up in that encounter-Mychart sent to respond to her question. Arianna FAROOQ CNP

## 2025-04-14 ENCOUNTER — VIRTUAL VISIT (OUTPATIENT)
Dept: FAMILY MEDICINE | Facility: OTHER | Age: 35
End: 2025-04-14
Payer: COMMERCIAL

## 2025-04-14 DIAGNOSIS — J30.1 SEASONAL ALLERGIC RHINITIS DUE TO POLLEN: ICD-10-CM

## 2025-04-14 DIAGNOSIS — G43.009 MIGRAINE WITHOUT AURA AND WITHOUT STATUS MIGRAINOSUS, NOT INTRACTABLE: Primary | ICD-10-CM

## 2025-04-14 PROCEDURE — 98014 SYNCH AUDIO-ONLY EST MOD 30: CPT | Performed by: PHYSICIAN ASSISTANT

## 2025-04-14 PROCEDURE — 1125F AMNT PAIN NOTED PAIN PRSNT: CPT | Mod: 93 | Performed by: PHYSICIAN ASSISTANT

## 2025-04-14 RX ORDER — KETOROLAC TROMETHAMINE 10 MG/1
10 TABLET, FILM COATED ORAL EVERY 6 HOURS PRN
Qty: 5 TABLET | Refills: 0 | Status: SHIPPED | OUTPATIENT
Start: 2025-04-14

## 2025-04-14 RX ORDER — CETIRIZINE HYDROCHLORIDE 10 MG/1
10 TABLET ORAL DAILY
Qty: 90 TABLET | Refills: 1 | Status: SHIPPED | OUTPATIENT
Start: 2025-04-14

## 2025-04-14 RX ORDER — ONDANSETRON 4 MG/1
4 TABLET, FILM COATED ORAL EVERY 8 HOURS PRN
Qty: 12 TABLET | Refills: 0 | Status: SHIPPED | OUTPATIENT
Start: 2025-04-14

## 2025-04-14 RX ORDER — SUMATRIPTAN 50 MG/1
50 TABLET, FILM COATED ORAL
Qty: 12 TABLET | Refills: 0 | Status: SHIPPED | OUTPATIENT
Start: 2025-04-14

## 2025-04-14 ASSESSMENT — ENCOUNTER SYMPTOMS: HEADACHES: 1

## 2025-04-14 NOTE — PROGRESS NOTES
Gloria is a 34 year old who is being evaluated via a billable telephone visit.    What phone number would you like to be contacted at? [phone]  How would you like to obtain your AVS? Pili  Originating Location (pt. Location): Home    Distant Location (provider location):  Off-site  Telephone visit completed due to the patient did not consent to a video visit.    Assessment & Plan     Migraine without aura and without status migrainosus, not intractable  - Has a history of migraine with same features.   - Continue to stay hydrated  - Keep Migraine diary  - For acute migraine right now we will treat with a dose of Toradol + Zofran. Ok to use Excedrin if needed  - For future migraine start on Sumatriptan at onset and can use the above regimen if needed still.   - Follow-up pending results of the medications.  Sooner if needed.    - ketorolac (TORADOL) 10 MG tablet; Take 1 tablet (10 mg) by mouth every 6 hours as needed for moderate pain.  - ondansetron (ZOFRAN) 4 MG tablet; Take 1 tablet (4 mg) by mouth every 8 hours as needed for nausea or vomiting.  - SUMAtriptan (IMITREX) 50 MG tablet; Take 1 tablet (50 mg) by mouth at onset of headache for migraine. May repeat in 2 hours. Max 4 tablets/24 hours.    Seasonal allergic rhinitis due to pollen  Start as this could be triggering symptoms despite having minimal symptoms.   - cetirizine (ZYRTEC) 10 MG tablet; Take 1 tablet (10 mg) by mouth daily.      Options for treatment and follow-up care were reviewed with the patient and/or guardian. Patient and/or guardian engaged in the decision making process and verbalized understanding of the options discussed and agreed with the final plan.      Subjective   Gloria is a 34 year old, presenting for the following health issues:  Headache      4/14/2025     8:44 AM   Additional Questions   Roomed by Reena     Headache     History of Present Illness       Headaches:   Since the patient's last clinic visit, headaches are:  "worsened  The patient is getting headaches:  Every day  She is not able to do normal daily activities when she has a migraine.  The patient is taking the following rescue/relief medications:  Excedrin   Patient states \"I get only a small amount of relief\" from the rescue/relief medications.   The patient is taking the following medications to prevent migraines:  No medications to prevent migraines  In the past 4 weeks, the patient has gone to an Urgent Care or Emergency Room 0 times times due to headaches.           - Coming and going for the last week. '  - Past weekend they were bad.  Saturday behind the eye, light sensitivity, vomiting, dizziness. It is going from top of head behind the eye and into the cheek just on the right side.  Sunday spent a lot of the day in bed. For the first time yesterday she took Excedrin for about 30 minutes and then came back.   - Hasn't had any migraines since middle/high school. She had migraines that were described as they were above.  She used to get Botox in the forehead area that would be helpful.   - no specific triggers she can think of. In general life has been stressful but nothing recently changed.   - Used to have really bad seasonal allergies but it seemed to go away, the last 5 years hasn't really been an issue for her.   - Water consumption - a lot.   - Drinks tea as well - HerbalLife tea just one.   - Other fluids - not regularly, sometimes will have a Gatorade   - Alcohol - No  - Nicotine/Tobacco - No  - Eating - regularly.   - Sleep - \"has been good\", 7-8 hours per night depending on when baby gets up.   - Is still taking Prenatal vitamin. No other supplements.       Review of Systems  Constitutional, neuro, ENT, endocrine, pulmonary, cardiac, gastrointestinal, genitourinary, musculoskeletal, integument and psychiatric systems are negative, except as otherwise noted.      Objective    Vitals - Patient Reported  Pain Score: Moderate Pain (6)  Pain Loc: " Head        Physical Exam   General: Alert and no distress //Respiratory: No audible wheeze, cough, or shortness of breath // Psychiatric:  Appropriate affect, tone, and pace of words            Phone call duration: 9:38 minutes  Signed Electronically by: Terese Dumont PA-C

## 2025-04-14 NOTE — PATIENT INSTRUCTIONS
TODAY:   Take Ketorolac 1 tablet with food in stomach  Take Zofran if needed for nausea  Ok to take Excedrin if needed.     FOR FUTURE MIGRAINE:  When you start to feel it coming on you can take Sumatriptan  Still ok to take Toradol dose and Zofran if needed with this as well as excedrin.     Start taking Cetirzine/Zyrtec once daily    Track migraines - keep a diary.  Follow-up if they persist or worsen.

## 2025-05-12 ENCOUNTER — OFFICE VISIT (OUTPATIENT)
Dept: FAMILY MEDICINE | Facility: CLINIC | Age: 35
End: 2025-05-12
Payer: COMMERCIAL

## 2025-05-12 VITALS
RESPIRATION RATE: 16 BRPM | HEART RATE: 60 BPM | TEMPERATURE: 98.3 F | OXYGEN SATURATION: 98 % | WEIGHT: 217 LBS | BODY MASS INDEX: 39.93 KG/M2 | HEIGHT: 62 IN | SYSTOLIC BLOOD PRESSURE: 128 MMHG | DIASTOLIC BLOOD PRESSURE: 79 MMHG

## 2025-05-12 DIAGNOSIS — Z32.01 PREGNANCY TEST POSITIVE: ICD-10-CM

## 2025-05-12 DIAGNOSIS — Z00.00 ROUTINE GENERAL MEDICAL EXAMINATION AT A HEALTH CARE FACILITY: Primary | ICD-10-CM

## 2025-05-12 DIAGNOSIS — Z13.1 SCREENING FOR DIABETES MELLITUS: ICD-10-CM

## 2025-05-12 LAB
BASOPHILS # BLD AUTO: 0 10E3/UL (ref 0–0.2)
BASOPHILS NFR BLD AUTO: 0 %
EOSINOPHIL # BLD AUTO: 0.1 10E3/UL (ref 0–0.7)
EOSINOPHIL NFR BLD AUTO: 1 %
ERYTHROCYTE [DISTWIDTH] IN BLOOD BY AUTOMATED COUNT: 12.9 % (ref 10–15)
EST. AVERAGE GLUCOSE BLD GHB EST-MCNC: 111 MG/DL
HBA1C MFR BLD: 5.5 % (ref 0–5.6)
HCG UR QL: POSITIVE
HCT VFR BLD AUTO: 39.1 % (ref 35–47)
HGB BLD-MCNC: 13.1 G/DL (ref 11.7–15.7)
IMM GRANULOCYTES # BLD: 0 10E3/UL
IMM GRANULOCYTES NFR BLD: 0 %
LYMPHOCYTES # BLD AUTO: 2.8 10E3/UL (ref 0.8–5.3)
LYMPHOCYTES NFR BLD AUTO: 32 %
MCH RBC QN AUTO: 29.4 PG (ref 26.5–33)
MCHC RBC AUTO-ENTMCNC: 33.5 G/DL (ref 31.5–36.5)
MCV RBC AUTO: 88 FL (ref 78–100)
MONOCYTES # BLD AUTO: 0.5 10E3/UL (ref 0–1.3)
MONOCYTES NFR BLD AUTO: 6 %
NEUTROPHILS # BLD AUTO: 5.1 10E3/UL (ref 1.6–8.3)
NEUTROPHILS NFR BLD AUTO: 60 %
PLATELET # BLD AUTO: 246 10E3/UL (ref 150–450)
RBC # BLD AUTO: 4.46 10E6/UL (ref 3.8–5.2)
WBC # BLD AUTO: 8.5 10E3/UL (ref 4–11)

## 2025-05-12 PROCEDURE — 83036 HEMOGLOBIN GLYCOSYLATED A1C: CPT | Performed by: FAMILY MEDICINE

## 2025-05-12 PROCEDURE — 99213 OFFICE O/P EST LOW 20 MIN: CPT | Mod: 25 | Performed by: FAMILY MEDICINE

## 2025-05-12 PROCEDURE — 81025 URINE PREGNANCY TEST: CPT | Performed by: FAMILY MEDICINE

## 2025-05-12 PROCEDURE — 99395 PREV VISIT EST AGE 18-39: CPT | Performed by: FAMILY MEDICINE

## 2025-05-12 PROCEDURE — 80053 COMPREHEN METABOLIC PANEL: CPT | Performed by: FAMILY MEDICINE

## 2025-05-12 PROCEDURE — 85025 COMPLETE CBC W/AUTO DIFF WBC: CPT | Performed by: FAMILY MEDICINE

## 2025-05-12 PROCEDURE — 80061 LIPID PANEL: CPT | Performed by: FAMILY MEDICINE

## 2025-05-12 PROCEDURE — 1126F AMNT PAIN NOTED NONE PRSNT: CPT | Performed by: FAMILY MEDICINE

## 2025-05-12 PROCEDURE — 84702 CHORIONIC GONADOTROPIN TEST: CPT | Performed by: FAMILY MEDICINE

## 2025-05-12 PROCEDURE — 3074F SYST BP LT 130 MM HG: CPT | Performed by: FAMILY MEDICINE

## 2025-05-12 PROCEDURE — 82728 ASSAY OF FERRITIN: CPT | Performed by: FAMILY MEDICINE

## 2025-05-12 PROCEDURE — 83540 ASSAY OF IRON: CPT | Performed by: FAMILY MEDICINE

## 2025-05-12 PROCEDURE — 36415 COLL VENOUS BLD VENIPUNCTURE: CPT | Performed by: FAMILY MEDICINE

## 2025-05-12 PROCEDURE — 3078F DIAST BP <80 MM HG: CPT | Performed by: FAMILY MEDICINE

## 2025-05-12 PROCEDURE — 83550 IRON BINDING TEST: CPT | Performed by: FAMILY MEDICINE

## 2025-05-12 SDOH — HEALTH STABILITY: PHYSICAL HEALTH: ON AVERAGE, HOW MANY DAYS PER WEEK DO YOU ENGAGE IN MODERATE TO STRENUOUS EXERCISE (LIKE A BRISK WALK)?: 1 DAY

## 2025-05-12 ASSESSMENT — ASTHMA QUESTIONNAIRES
QUESTION_2 LAST FOUR WEEKS HOW OFTEN HAVE YOU HAD SHORTNESS OF BREATH: NOT AT ALL
QUESTION_4 LAST FOUR WEEKS HOW OFTEN HAVE YOU USED YOUR RESCUE INHALER OR NEBULIZER MEDICATION (SUCH AS ALBUTEROL): NOT AT ALL
QUESTION_5 LAST FOUR WEEKS HOW WOULD YOU RATE YOUR ASTHMA CONTROL: COMPLETELY CONTROLLED
ACT_TOTALSCORE: 25
QUESTION_1 LAST FOUR WEEKS HOW MUCH OF THE TIME DID YOUR ASTHMA KEEP YOU FROM GETTING AS MUCH DONE AT WORK, SCHOOL OR AT HOME: NONE OF THE TIME
QUESTION_3 LAST FOUR WEEKS HOW OFTEN DID YOUR ASTHMA SYMPTOMS (WHEEZING, COUGHING, SHORTNESS OF BREATH, CHEST TIGHTNESS OR PAIN) WAKE YOU UP AT NIGHT OR EARLIER THAN USUAL IN THE MORNING: NOT AT ALL

## 2025-05-12 ASSESSMENT — PAIN SCALES - GENERAL: PAINLEVEL_OUTOF10: NO PAIN (0)

## 2025-05-12 ASSESSMENT — PATIENT HEALTH QUESTIONNAIRE - PHQ9
10. IF YOU CHECKED OFF ANY PROBLEMS, HOW DIFFICULT HAVE THESE PROBLEMS MADE IT FOR YOU TO DO YOUR WORK, TAKE CARE OF THINGS AT HOME, OR GET ALONG WITH OTHER PEOPLE: NOT DIFFICULT AT ALL
SUM OF ALL RESPONSES TO PHQ QUESTIONS 1-9: 0
SUM OF ALL RESPONSES TO PHQ QUESTIONS 1-9: 0

## 2025-05-12 ASSESSMENT — SOCIAL DETERMINANTS OF HEALTH (SDOH): HOW OFTEN DO YOU GET TOGETHER WITH FRIENDS OR RELATIVES?: MORE THAN THREE TIMES A WEEK

## 2025-05-12 NOTE — PATIENT INSTRUCTIONS
Patient Education   Preventive Care Advice   This is general advice given by our system to help you stay healthy. However, your care team may have specific advice just for you. Please talk to your care team about your preventive care needs.  Nutrition  Eat 5 or more servings of fruits and vegetables each day.  Try wheat bread, brown rice and whole grain pasta (instead of white bread, rice, and pasta).  Get enough calcium and vitamin D. Check the label on foods and aim for 100% of the RDA (recommended daily allowance).  Lifestyle  Exercise at least 150 minutes each week  (30 minutes a day, 5 days a week).  Do muscle strengthening activities 2 days a week. These help control your weight and prevent disease.  No smoking.  Wear sunscreen to prevent skin cancer.  Have a dental exam and cleaning every 6 months.  Yearly exams  See your health care team every year to talk about:  Any changes in your health.  Any medicines your care team has prescribed.  Preventive care, family planning, and ways to prevent chronic diseases.  Shots (vaccines)   HPV shots (up to age 26), if you've never had them before.  Hepatitis B shots (up to age 59), if you've never had them before.  COVID-19 shot: Get this shot when it's due.  Flu shot: Get a flu shot every year.  Tetanus shot: Get a tetanus shot every 10 years.  Pneumococcal, hepatitis A, and RSV shots: Ask your care team if you need these based on your risk.  Shingles shot (for age 50 and up)  General health tests  Diabetes screening:  Starting at age 35, Get screened for diabetes at least every 3 years.  If you are younger than age 35, ask your care team if you should be screened for diabetes.  Cholesterol test: At age 39, start having a cholesterol test every 5 years, or more often if advised.  Bone density scan (DEXA): At age 50, ask your care team if you should have this scan for osteoporosis (brittle bones).  Hepatitis C: Get tested at least once in your life.  STIs (sexually  transmitted infections)  Before age 24: Ask your care team if you should be screened for STIs.  After age 24: Get screened for STIs if you're at risk. You are at risk for STIs (including HIV) if:  You are sexually active with more than one person.  You don't use condoms every time.  You or a partner was diagnosed with a sexually transmitted infection.  If you are at risk for HIV, ask about PrEP medicine to prevent HIV.  Get tested for HIV at least once in your life, whether you are at risk for HIV or not.  Cancer screening tests  Cervical cancer screening: If you have a cervix, begin getting regular cervical cancer screening tests starting at age 21.  Breast cancer scan (mammogram): If you've ever had breasts, begin having regular mammograms starting at age 40. This is a scan to check for breast cancer.  Colon cancer screening: It is important to start screening for colon cancer at age 45.  Have a colonoscopy test every 10 years (or more often if you're at risk) Or, ask your provider about stool tests like a FIT test every year or Cologuard test every 3 years.  To learn more about your testing options, visit:   .  For help making a decision, visit:   https://bit.ly/ob71220.  Prostate cancer screening test: If you have a prostate, ask your care team if a prostate cancer screening test (PSA) at age 55 is right for you.  Lung cancer screening: If you are a current or former smoker ages 50 to 80, ask your care team if ongoing lung cancer screenings are right for you.  For informational purposes only. Not to replace the advice of your health care provider. Copyright   2023 [employer]. All rights reserved. Clinically reviewed by the [facility] Transitions Program. [employer] 235279 - REV 01/24.

## 2025-05-12 NOTE — LETTER
"My Asthma Action Plan    Name: Gloria Lozano   YOB: 1990  Date: 5/12/2025   My doctor: Chhaya Herbert MD   My clinic: [facility]        My Rescue Medicine: { :661718::\"Albuterol (Proair/Ventolin/Proventil HFA) 2-4 puffs EVERY 4 HOURS as needed. Use a spacer if recommended by your provider.\"}   My Asthma Severity:   { :015715::\"Intermittent / Exercise Induced\"}  Know your asthma triggers: { :938627}  None          GREEN ZONE   Good Control  I feel good  No cough or wheeze  Can work, sleep and play without asthma symptoms       Take your asthma control medicine every day.     If exercise triggers your asthma, take your rescue medication  15 minutes before exercise or sports, and  During exercise if you have asthma symptoms  Spacer to use with inhaler: If you have a spacer, make sure to use it with your inhaler             YELLOW ZONE Getting Worse  I have ANY of these:  I do not feel good  Cough or wheeze  Chest feels tight  Wake up at night   Keep taking your Green Zone medications  Start taking your rescue medicine:  every 20 minutes for up to 1 hour. Then every 4 hours for 24-48 hours.  If you stay in the Yellow Zone for more than 12-24 hours, contact your doctor.  If you do not return to the Green Zone in 12-24 hours or you get worse, start taking your oral steroid medicine if prescribed by your provider.           RED ZONE Medical Alert - Get Help  I have ANY of these:  I feel awful  Medicine is not helping  Breathing getting harder  Trouble walking or talking  Nose opens wide to breathe       Take your rescue medicine NOW  If your provider has prescribed an oral steroid medicine, start taking it NOW  Call your doctor NOW  If you are still in the Red Zone after 20 minutes and you have not reached your doctor:  Take your rescue medicine again and  Call 911 or go to the emergency room right away    See your regular doctor within 2 weeks of an Emergency Room or Urgent Care " visit for follow-up treatment.          Annual Reminders:  Meet with Asthma Educator,  Flu Shot in the Fall, consider Pneumonia Vaccination for patients with asthma (aged 19 and older).    Pharmacy:    [id number] [place] - [address] [facility] [place], [place] - [address]  [facility] [place] [address]  [id number] - [place], [place] - [address] AT [id number] - [place], [place] - [address] AT [facility]    Electronically signed by Chhaya Herbert MD   Date: 05/12/25                    Asthma Triggers  How To Control Things That Make Your Asthma Worse    Triggers are things that make your asthma worse.  Look at the list below to help you find your triggers and   what you can do about them. You can help prevent asthma flare-ups by staying away from your triggers.      Trigger                                                          What you can do   Cigarette Smoke  Tobacco smoke can make asthma worse. Do not allow smoking in your home, car or around you.  Be sure no one smokes at a child s day care or school.  If you smoke, ask your health care provider for ways to help you quit.  Ask family members to quit too.  Ask your health care provider for a referral to Quit Plan to help you quit smoking, or call 3-924-085-PLAN.     Colds, Flu, Bronchitis  These are common triggers of asthma. Wash your hands often.  Don t touch your eyes, nose or mouth.  Get a flu shot every year.     Dust Mites  These are tiny bugs that live in cloth or carpet. They are too small to see. Wash sheets and blankets in hot water every week.   Encase pillows and mattress in dust mite proof covers.  Avoid having carpet if you can. If you have carpet, vacuum weekly.   Use a dust mask and HEPA vacuum.   Pollen and Outdoor  Mold  Some people are allergic to trees, grass, or weed pollen, or molds. Try to keep your windows closed.  Limit time out doors when pollen count is high.   Ask you health care provider about taking medicine during allergy season.     Animal Dander  Some people are allergic to skin flakes, urine or saliva from pets with fur or feathers. Keep pets with fur or feathers out of your home.    If you can t keep the pet outdoors, then keep the pet out of your bedroom.  Keep the bedroom door closed.  Keep pets off cloth furniture and away from stuffed toys.     Mice, Rats, and Cockroaches  Some people are allergic to the waste from these pests.   Cover food and garbage.  Clean up spills and food crumbs.  Store grease in the refrigerator.   Keep food out of the bedroom.   Indoor Mold  This can be a trigger if your home has high moisture. Fix leaking faucets, pipes, or other sources of water.   Clean moldy surfaces.  Dehumidify basement if it is damp and smelly.   Smoke, Strong Odors, and Sprays  These can reduce air quality. Stay away from strong odors and sprays, such as perfume, powder, hair spray, paints, smoke incense, paint, cleaning products, candles and new carpet.   Exercise or Sports  Some people with asthma have this trigger. Be active!  Ask your doctor about taking medicine before sports or exercise to prevent symptoms.    Warm up for 5-10 minutes before and after sports or exercise.     Other Triggers of Asthma  Cold air:  Cover your nose and mouth with a scarf.  Sometimes laughing or crying can be a trigger.  Some medicines and food can trigger asthma.
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
## 2025-05-13 ENCOUNTER — PATIENT OUTREACH (OUTPATIENT)
Dept: CARE COORDINATION | Facility: CLINIC | Age: 35
End: 2025-05-13
Payer: COMMERCIAL

## 2025-05-13 LAB
ALBUMIN SERPL BCG-MCNC: 3.9 G/DL (ref 3.5–5.2)
ALP SERPL-CCNC: 98 U/L (ref 40–150)
ALT SERPL W P-5'-P-CCNC: 22 U/L (ref 0–50)
ANION GAP SERPL CALCULATED.3IONS-SCNC: 10 MMOL/L (ref 7–15)
AST SERPL W P-5'-P-CCNC: 26 U/L (ref 0–45)
BILIRUB SERPL-MCNC: 1.6 MG/DL
BUN SERPL-MCNC: 8.6 MG/DL (ref 6–20)
CALCIUM SERPL-MCNC: 9 MG/DL (ref 8.8–10.4)
CHLORIDE SERPL-SCNC: 105 MMOL/L (ref 98–107)
CHOLEST SERPL-MCNC: 157 MG/DL
CREAT SERPL-MCNC: 0.77 MG/DL (ref 0.51–0.95)
EGFRCR SERPLBLD CKD-EPI 2021: >90 ML/MIN/1.73M2
FASTING STATUS PATIENT QL REPORTED: NO
FASTING STATUS PATIENT QL REPORTED: NO
FERRITIN SERPL-MCNC: 20 NG/ML (ref 6–175)
GLUCOSE SERPL-MCNC: 74 MG/DL (ref 70–99)
HCG INTACT+B SERPL-ACNC: 1384 MIU/ML
HCO3 SERPL-SCNC: 23 MMOL/L (ref 22–29)
HDLC SERPL-MCNC: 44 MG/DL
IRON BINDING CAPACITY (ROCHE): 323 UG/DL (ref 240–430)
IRON SATN MFR SERPL: 38 % (ref 15–46)
IRON SERPL-MCNC: 122 UG/DL (ref 37–145)
LDLC SERPL CALC-MCNC: 93 MG/DL
NONHDLC SERPL-MCNC: 113 MG/DL
POTASSIUM SERPL-SCNC: 3.9 MMOL/L (ref 3.4–5.3)
PROT SERPL-MCNC: 6.7 G/DL (ref 6.4–8.3)
SODIUM SERPL-SCNC: 138 MMOL/L (ref 135–145)
TRIGL SERPL-MCNC: 102 MG/DL